# Patient Record
Sex: MALE | Race: WHITE | Employment: FULL TIME | URBAN - METROPOLITAN AREA
[De-identification: names, ages, dates, MRNs, and addresses within clinical notes are randomized per-mention and may not be internally consistent; named-entity substitution may affect disease eponyms.]

---

## 2021-09-29 ENCOUNTER — APPOINTMENT (OUTPATIENT)
Dept: CT IMAGING | Facility: CLINIC | Age: 46
DRG: 096 | End: 2021-09-29
Payer: COMMERCIAL

## 2021-09-29 ENCOUNTER — HOSPITAL ENCOUNTER (INPATIENT)
Age: 46
LOS: 14 days | Discharge: HOME | DRG: 096 | End: 2021-10-14
Attending: EMERGENCY MEDICINE | Admitting: FAMILY MEDICINE
Payer: COMMERCIAL

## 2021-09-29 DIAGNOSIS — G89.29 CHRONIC BILATERAL LOW BACK PAIN WITH BILATERAL SCIATICA: ICD-10-CM

## 2021-09-29 DIAGNOSIS — R20.2 PARESTHESIA OF BOTH HANDS: ICD-10-CM

## 2021-09-29 DIAGNOSIS — R20.2 PARESTHESIA OF BOTH LOWER EXTREMITIES: Primary | ICD-10-CM

## 2021-09-29 DIAGNOSIS — R53.1 GENERALIZED WEAKNESS: ICD-10-CM

## 2021-09-29 DIAGNOSIS — M54.42 CHRONIC BILATERAL LOW BACK PAIN WITH BILATERAL SCIATICA: ICD-10-CM

## 2021-09-29 DIAGNOSIS — M54.41 CHRONIC BILATERAL LOW BACK PAIN WITH BILATERAL SCIATICA: ICD-10-CM

## 2021-09-29 LAB
ABSOLUTE EOS #: 0.2 K/UL (ref 0–0.4)
ABSOLUTE IMMATURE GRANULOCYTE: NORMAL K/UL (ref 0–0.3)
ABSOLUTE LYMPH #: 1.8 K/UL (ref 1–4.8)
ABSOLUTE MONO #: 0.6 K/UL (ref 0.1–1.2)
ALBUMIN SERPL-MCNC: 4.5 G/DL (ref 3.5–5.2)
ALBUMIN/GLOBULIN RATIO: 1.8 (ref 1–2.5)
ALP BLD-CCNC: 72 U/L (ref 40–129)
ALT SERPL-CCNC: 29 U/L (ref 5–41)
ANION GAP SERPL CALCULATED.3IONS-SCNC: 10 MMOL/L (ref 9–17)
AST SERPL-CCNC: 18 U/L
BASOPHILS # BLD: 1 % (ref 0–2)
BASOPHILS ABSOLUTE: 0 K/UL (ref 0–0.2)
BILIRUB SERPL-MCNC: 0.4 MG/DL (ref 0.3–1.2)
BILIRUBIN URINE: NEGATIVE
BUN BLDV-MCNC: 17 MG/DL (ref 6–20)
BUN/CREAT BLD: ABNORMAL (ref 9–20)
C-REACTIVE PROTEIN: <3 MG/L (ref 0–5)
CALCIUM SERPL-MCNC: 9.6 MG/DL (ref 8.6–10.4)
CHLORIDE BLD-SCNC: 104 MMOL/L (ref 98–107)
CO2: 26 MMOL/L (ref 20–31)
COLOR: YELLOW
COMMENT UA: NORMAL
CREAT SERPL-MCNC: 1.3 MG/DL (ref 0.7–1.2)
DIFFERENTIAL TYPE: NORMAL
EOSINOPHILS RELATIVE PERCENT: 2 % (ref 1–4)
GFR AFRICAN AMERICAN: >60 ML/MIN
GFR NON-AFRICAN AMERICAN: 59 ML/MIN
GFR SERPL CREATININE-BSD FRML MDRD: ABNORMAL ML/MIN/{1.73_M2}
GFR SERPL CREATININE-BSD FRML MDRD: ABNORMAL ML/MIN/{1.73_M2}
GLUCOSE BLD-MCNC: 120 MG/DL (ref 70–99)
GLUCOSE URINE: NEGATIVE
HCT VFR BLD CALC: 41.2 % (ref 41–53)
HEMOGLOBIN: 13.9 G/DL (ref 13.5–17.5)
IMMATURE GRANULOCYTES: NORMAL %
INR BLD: 1.1
KETONES, URINE: NEGATIVE
LACTIC ACID, SEPSIS WHOLE BLOOD: NORMAL MMOL/L (ref 0.5–1.9)
LACTIC ACID, SEPSIS: 1 MMOL/L (ref 0.5–1.9)
LEUKOCYTE ESTERASE, URINE: NEGATIVE
LYMPHOCYTES # BLD: 26 % (ref 24–44)
MCH RBC QN AUTO: 28 PG (ref 26–34)
MCHC RBC AUTO-ENTMCNC: 33.7 G/DL (ref 31–37)
MCV RBC AUTO: 83.1 FL (ref 80–100)
MONOCYTES # BLD: 9 % (ref 2–11)
NITRITE, URINE: NEGATIVE
NRBC AUTOMATED: NORMAL PER 100 WBC
PARTIAL THROMBOPLASTIN TIME: 24.1 SEC (ref 21.3–31.3)
PDW BLD-RTO: 13.2 % (ref 12.5–15.4)
PH UA: 7 (ref 5–8)
PLATELET # BLD: 222 K/UL (ref 140–450)
PLATELET ESTIMATE: NORMAL
PMV BLD AUTO: 8.2 FL (ref 6–12)
POTASSIUM SERPL-SCNC: 4.1 MMOL/L (ref 3.7–5.3)
PROTEIN UA: NEGATIVE
PROTHROMBIN TIME: 11 SEC (ref 9.4–12.6)
RBC # BLD: 4.95 M/UL (ref 4.5–5.9)
RBC # BLD: NORMAL 10*6/UL
SARS-COV-2, RAPID: NOT DETECTED
SEDIMENTATION RATE, ERYTHROCYTE: <1 MM (ref 0–15)
SEG NEUTROPHILS: 62 % (ref 36–66)
SEGMENTED NEUTROPHILS ABSOLUTE COUNT: 4.2 K/UL (ref 1.8–7.7)
SODIUM BLD-SCNC: 140 MMOL/L (ref 135–144)
SPECIFIC GRAVITY UA: 1.01 (ref 1–1.03)
SPECIMEN DESCRIPTION: NORMAL
TOTAL PROTEIN: 7 G/DL (ref 6.4–8.3)
TROPONIN INTERP: NORMAL
TROPONIN T: NORMAL NG/ML
TROPONIN, HIGH SENSITIVITY: <6 NG/L (ref 0–22)
TURBIDITY: CLEAR
URINE HGB: NEGATIVE
UROBILINOGEN, URINE: NORMAL
WBC # BLD: 6.9 K/UL (ref 3.5–11)
WBC # BLD: NORMAL 10*3/UL

## 2021-09-29 PROCEDURE — 96375 TX/PRO/DX INJ NEW DRUG ADDON: CPT

## 2021-09-29 PROCEDURE — 99284 EMERGENCY DEPT VISIT MOD MDM: CPT

## 2021-09-29 PROCEDURE — 80053 COMPREHEN METABOLIC PANEL: CPT

## 2021-09-29 PROCEDURE — 84484 ASSAY OF TROPONIN QUANT: CPT

## 2021-09-29 PROCEDURE — 93005 ELECTROCARDIOGRAM TRACING: CPT | Performed by: EMERGENCY MEDICINE

## 2021-09-29 PROCEDURE — 72128 CT CHEST SPINE W/O DYE: CPT

## 2021-09-29 PROCEDURE — 96372 THER/PROPH/DIAG INJ SC/IM: CPT

## 2021-09-29 PROCEDURE — 36415 COLL VENOUS BLD VENIPUNCTURE: CPT

## 2021-09-29 PROCEDURE — 70450 CT HEAD/BRAIN W/O DYE: CPT

## 2021-09-29 PROCEDURE — 85025 COMPLETE CBC W/AUTO DIFF WBC: CPT

## 2021-09-29 PROCEDURE — 72125 CT NECK SPINE W/O DYE: CPT

## 2021-09-29 PROCEDURE — 87635 SARS-COV-2 COVID-19 AMP PRB: CPT

## 2021-09-29 PROCEDURE — 96374 THER/PROPH/DIAG INJ IV PUSH: CPT

## 2021-09-29 PROCEDURE — 72131 CT LUMBAR SPINE W/O DYE: CPT

## 2021-09-29 PROCEDURE — 85610 PROTHROMBIN TIME: CPT

## 2021-09-29 PROCEDURE — 83605 ASSAY OF LACTIC ACID: CPT

## 2021-09-29 PROCEDURE — 85652 RBC SED RATE AUTOMATED: CPT

## 2021-09-29 PROCEDURE — 81003 URINALYSIS AUTO W/O SCOPE: CPT

## 2021-09-29 PROCEDURE — 86140 C-REACTIVE PROTEIN: CPT

## 2021-09-29 PROCEDURE — 85730 THROMBOPLASTIN TIME PARTIAL: CPT

## 2021-09-29 PROCEDURE — 2580000003 HC RX 258: Performed by: EMERGENCY MEDICINE

## 2021-09-29 PROCEDURE — 6360000002 HC RX W HCPCS: Performed by: EMERGENCY MEDICINE

## 2021-09-29 RX ORDER — MORPHINE SULFATE 4 MG/ML
4 INJECTION, SOLUTION INTRAMUSCULAR; INTRAVENOUS ONCE
Status: COMPLETED | OUTPATIENT
Start: 2021-09-29 | End: 2021-09-29

## 2021-09-29 RX ORDER — 0.9 % SODIUM CHLORIDE 0.9 %
1000 INTRAVENOUS SOLUTION INTRAVENOUS ONCE
Status: COMPLETED | OUTPATIENT
Start: 2021-09-29 | End: 2021-09-29

## 2021-09-29 RX ORDER — ORPHENADRINE CITRATE 30 MG/ML
60 INJECTION INTRAMUSCULAR; INTRAVENOUS ONCE
Status: COMPLETED | OUTPATIENT
Start: 2021-09-29 | End: 2021-09-29

## 2021-09-29 RX ORDER — SODIUM CHLORIDE 9 MG/ML
1000 INJECTION, SOLUTION INTRAVENOUS CONTINUOUS
Status: DISCONTINUED | OUTPATIENT
Start: 2021-09-29 | End: 2021-10-04

## 2021-09-29 RX ADMIN — Medication 4 MG: at 23:02

## 2021-09-29 RX ADMIN — ORPHENADRINE CITRATE 60 MG: 60 INJECTION INTRAMUSCULAR; INTRAVENOUS at 23:01

## 2021-09-29 RX ADMIN — SODIUM CHLORIDE 1000 ML: 9 INJECTION, SOLUTION INTRAVENOUS at 23:00

## 2021-09-29 RX ADMIN — SODIUM CHLORIDE 1000 ML: 9 INJECTION, SOLUTION INTRAVENOUS at 21:11

## 2021-09-29 ASSESSMENT — PAIN SCALES - GENERAL
PAINLEVEL_OUTOF10: 9
PAINLEVEL_OUTOF10: 4

## 2021-09-30 ENCOUNTER — APPOINTMENT (OUTPATIENT)
Dept: MRI IMAGING | Age: 46
DRG: 096 | End: 2021-09-30
Payer: COMMERCIAL

## 2021-09-30 PROBLEM — R20.2 LEG PARESTHESIA: Status: ACTIVE | Noted: 2021-09-30

## 2021-09-30 LAB
EKG ATRIAL RATE: 68 BPM
EKG P AXIS: 34 DEGREES
EKG P-R INTERVAL: 170 MS
EKG Q-T INTERVAL: 388 MS
EKG QRS DURATION: 94 MS
EKG QTC CALCULATION (BAZETT): 412 MS
EKG R AXIS: -5 DEGREES
EKG T AXIS: 12 DEGREES
EKG VENTRICULAR RATE: 68 BPM
GLUCOSE BLD-MCNC: 97 MG/DL (ref 75–110)
MYOGLOBIN: 66 NG/ML (ref 28–72)
TOTAL CK: 158 U/L (ref 39–308)

## 2021-09-30 PROCEDURE — 94761 N-INVAS EAR/PLS OXIMETRY MLT: CPT

## 2021-09-30 PROCEDURE — 93880 EXTRACRANIAL BILAT STUDY: CPT

## 2021-09-30 PROCEDURE — 6370000000 HC RX 637 (ALT 250 FOR IP): Performed by: PSYCHIATRY & NEUROLOGY

## 2021-09-30 PROCEDURE — 6370000000 HC RX 637 (ALT 250 FOR IP): Performed by: FAMILY MEDICINE

## 2021-09-30 PROCEDURE — 2060000000 HC ICU INTERMEDIATE R&B

## 2021-09-30 PROCEDURE — 36415 COLL VENOUS BLD VENIPUNCTURE: CPT

## 2021-09-30 PROCEDURE — 99253 IP/OBS CNSLTJ NEW/EST LOW 45: CPT | Performed by: PSYCHIATRY & NEUROLOGY

## 2021-09-30 PROCEDURE — 92523 SPEECH SOUND LANG COMPREHEN: CPT

## 2021-09-30 PROCEDURE — 2580000003 HC RX 258: Performed by: EMERGENCY MEDICINE

## 2021-09-30 PROCEDURE — 82550 ASSAY OF CK (CPK): CPT

## 2021-09-30 PROCEDURE — 94799 UNLISTED PULMONARY SVC/PX: CPT

## 2021-09-30 PROCEDURE — 2000000000 HC ICU R&B

## 2021-09-30 PROCEDURE — 6360000002 HC RX W HCPCS: Performed by: PSYCHIATRY & NEUROLOGY

## 2021-09-30 PROCEDURE — 6360000002 HC RX W HCPCS: Performed by: EMERGENCY MEDICINE

## 2021-09-30 PROCEDURE — 82947 ASSAY GLUCOSE BLOOD QUANT: CPT

## 2021-09-30 PROCEDURE — 81001 URINALYSIS AUTO W/SCOPE: CPT

## 2021-09-30 PROCEDURE — 83874 ASSAY OF MYOGLOBIN: CPT

## 2021-09-30 PROCEDURE — 82085 ASSAY OF ALDOLASE: CPT

## 2021-09-30 RX ORDER — ASPIRIN 300 MG/1
300 SUPPOSITORY RECTAL DAILY
Status: DISCONTINUED | OUTPATIENT
Start: 2021-09-30 | End: 2021-10-02

## 2021-09-30 RX ORDER — ATORVASTATIN CALCIUM 40 MG/1
40 TABLET, FILM COATED ORAL DAILY
COMMUNITY

## 2021-09-30 RX ORDER — ZOLPIDEM TARTRATE 5 MG/1
5 TABLET ORAL NIGHTLY PRN
Status: DISCONTINUED | OUTPATIENT
Start: 2021-09-30 | End: 2021-10-12

## 2021-09-30 RX ORDER — DIPHENHYDRAMINE HCL 25 MG
25 TABLET ORAL EVERY 6 HOURS PRN
Status: DISCONTINUED | OUTPATIENT
Start: 2021-09-30 | End: 2021-10-14 | Stop reason: HOSPADM

## 2021-09-30 RX ORDER — ONDANSETRON 2 MG/ML
4 INJECTION INTRAMUSCULAR; INTRAVENOUS EVERY 6 HOURS PRN
Status: DISCONTINUED | OUTPATIENT
Start: 2021-09-30 | End: 2021-10-14 | Stop reason: HOSPADM

## 2021-09-30 RX ORDER — ACETAMINOPHEN 325 MG/1
650 TABLET ORAL ONCE
Status: COMPLETED | OUTPATIENT
Start: 2021-09-30 | End: 2021-09-30

## 2021-09-30 RX ORDER — SODIUM CHLORIDE 0.9 % (FLUSH) 0.9 %
10 SYRINGE (ML) INJECTION EVERY 12 HOURS SCHEDULED
Status: DISCONTINUED | OUTPATIENT
Start: 2021-09-30 | End: 2021-10-14 | Stop reason: HOSPADM

## 2021-09-30 RX ORDER — DIPHENHYDRAMINE HCL 25 MG
25 TABLET ORAL ONCE
Status: COMPLETED | OUTPATIENT
Start: 2021-09-30 | End: 2021-09-30

## 2021-09-30 RX ORDER — ACETAMINOPHEN 325 MG/1
650 TABLET ORAL EVERY 6 HOURS PRN
Status: DISCONTINUED | OUTPATIENT
Start: 2021-09-30 | End: 2021-10-05

## 2021-09-30 RX ORDER — POLYETHYLENE GLYCOL 3350 17 G/17G
17 POWDER, FOR SOLUTION ORAL DAILY
COMMUNITY

## 2021-09-30 RX ORDER — SODIUM CHLORIDE 9 MG/ML
25 INJECTION, SOLUTION INTRAVENOUS PRN
Status: DISCONTINUED | OUTPATIENT
Start: 2021-09-30 | End: 2021-10-14 | Stop reason: HOSPADM

## 2021-09-30 RX ORDER — KETOROLAC TROMETHAMINE 30 MG/ML
30 INJECTION, SOLUTION INTRAMUSCULAR; INTRAVENOUS ONCE
Status: COMPLETED | OUTPATIENT
Start: 2021-09-30 | End: 2021-09-30

## 2021-09-30 RX ORDER — AMLODIPINE BESYLATE 2.5 MG/1
2.5 TABLET ORAL DAILY
COMMUNITY

## 2021-09-30 RX ORDER — METOCLOPRAMIDE HYDROCHLORIDE 5 MG/ML
10 INJECTION INTRAMUSCULAR; INTRAVENOUS ONCE
Status: COMPLETED | OUTPATIENT
Start: 2021-09-30 | End: 2021-09-30

## 2021-09-30 RX ORDER — LISINOPRIL 40 MG/1
40 TABLET ORAL DAILY
Status: ON HOLD | COMMUNITY
End: 2021-10-14 | Stop reason: HOSPADM

## 2021-09-30 RX ORDER — DIPHENHYDRAMINE HYDROCHLORIDE 50 MG/ML
25 INJECTION INTRAMUSCULAR; INTRAVENOUS ONCE
Status: COMPLETED | OUTPATIENT
Start: 2021-09-30 | End: 2021-09-30

## 2021-09-30 RX ORDER — ZINC 25 MG
1 TABLET ORAL DAILY
COMMUNITY

## 2021-09-30 RX ORDER — ONDANSETRON 4 MG/1
4 TABLET, ORALLY DISINTEGRATING ORAL EVERY 8 HOURS PRN
Status: DISCONTINUED | OUTPATIENT
Start: 2021-09-30 | End: 2021-10-14 | Stop reason: HOSPADM

## 2021-09-30 RX ORDER — SODIUM CHLORIDE 0.9 % (FLUSH) 0.9 %
10 SYRINGE (ML) INJECTION PRN
Status: DISCONTINUED | OUTPATIENT
Start: 2021-09-30 | End: 2021-10-14 | Stop reason: HOSPADM

## 2021-09-30 RX ORDER — HYDROCODONE BITARTRATE AND ACETAMINOPHEN 5; 325 MG/1; MG/1
1 TABLET ORAL EVERY 4 HOURS PRN
Status: DISCONTINUED | OUTPATIENT
Start: 2021-09-30 | End: 2021-10-14 | Stop reason: HOSPADM

## 2021-09-30 RX ORDER — ASPIRIN 81 MG/1
81 TABLET ORAL DAILY
Status: DISCONTINUED | OUTPATIENT
Start: 2021-09-30 | End: 2021-10-03

## 2021-09-30 RX ORDER — ATORVASTATIN CALCIUM 40 MG/1
40 TABLET, FILM COATED ORAL NIGHTLY
Status: DISCONTINUED | OUTPATIENT
Start: 2021-09-30 | End: 2021-10-02

## 2021-09-30 RX ADMIN — METOCLOPRAMIDE HYDROCHLORIDE 10 MG: 5 INJECTION INTRAMUSCULAR; INTRAVENOUS at 01:28

## 2021-09-30 RX ADMIN — KETOROLAC TROMETHAMINE 30 MG: 30 INJECTION, SOLUTION INTRAMUSCULAR at 01:28

## 2021-09-30 RX ADMIN — IMMUNE GLOBULIN (HUMAN) 35 G: 10 INJECTION INTRAVENOUS; SUBCUTANEOUS at 15:04

## 2021-09-30 RX ADMIN — ATORVASTATIN CALCIUM 40 MG: 40 TABLET, FILM COATED ORAL at 22:53

## 2021-09-30 RX ADMIN — ZOLPIDEM TARTRATE 5 MG: 5 TABLET ORAL at 22:53

## 2021-09-30 RX ADMIN — HYDROCODONE BITARTRATE AND ACETAMINOPHEN 1 TABLET: 5; 325 TABLET ORAL at 22:53

## 2021-09-30 RX ADMIN — SODIUM CHLORIDE 1000 ML: 9 INJECTION, SOLUTION INTRAVENOUS at 07:29

## 2021-09-30 RX ADMIN — DIPHENHYDRAMINE HYDROCHLORIDE 25 MG: 50 INJECTION, SOLUTION INTRAMUSCULAR; INTRAVENOUS at 01:27

## 2021-09-30 RX ADMIN — DIPHENHYDRAMINE HCL 25 MG: 25 TABLET ORAL at 14:37

## 2021-09-30 RX ADMIN — ONDANSETRON 4 MG: 4 TABLET, ORALLY DISINTEGRATING ORAL at 15:14

## 2021-09-30 RX ADMIN — ACETAMINOPHEN 650 MG: 325 TABLET ORAL at 14:38

## 2021-09-30 ASSESSMENT — PAIN DESCRIPTION - LOCATION
LOCATION: BACK
LOCATION: HEAD

## 2021-09-30 ASSESSMENT — PAIN SCALES - GENERAL
PAINLEVEL_OUTOF10: 6
PAINLEVEL_OUTOF10: 7
PAINLEVEL_OUTOF10: 7

## 2021-09-30 ASSESSMENT — PAIN DESCRIPTION - DESCRIPTORS: DESCRIPTORS: ACHING;DISCOMFORT;SHOOTING

## 2021-09-30 ASSESSMENT — PAIN DESCRIPTION - PAIN TYPE
TYPE: ACUTE PAIN
TYPE: ACUTE PAIN

## 2021-09-30 ASSESSMENT — PAIN DESCRIPTION - ONSET: ONSET: ON-GOING

## 2021-09-30 ASSESSMENT — PAIN DESCRIPTION - ORIENTATION: ORIENTATION: RIGHT;MID

## 2021-09-30 ASSESSMENT — PAIN DESCRIPTION - FREQUENCY: FREQUENCY: CONTINUOUS

## 2021-09-30 NOTE — ED TRIAGE NOTES
Pt to ED with c/o of worsening weakness and numbness. Pt reports that starting Monday morning he has bilat numbness in legs, and numbness in right hand. Pt reports that he has hx of chronic back pain with associated leg numbness but it has been getting worse. Pt is alert and oriented, NAD, RR even and unlabored. Pt placed on cardiac monitor, continuous pulse ox, and BP cuff.  Pt LKW was Sunday night

## 2021-09-30 NOTE — CONSULTS
Neurology Consult Note        Reason for Consult:  subcute progressive weakness and numbness  Requesting Physician:  Dr Ronald Nails:  Numbness and weakness    History Obtained From:  patient       HISTORY OF PRESENT ILLNESS:    This is a generally healthy and active 52 y/o WM who on Monday began to notice numbness in his feet. He originally attributed this to some musculoskeletal issues but then began to notice fatigue and weakness in his legs and increased lower back and upper neck pain. Yesterday he noted numbness in his finger tips and also progressive weakness in his arms. Of note he had a fairly severe viral illness about 2 weeks ago which consisted of severe malaise, fevers and arthralgias but this resolved after about 2 days. He presently reports he is getting worse day to day and is now very unsteady on his feet.                    Past Medical History:        Diagnosis Date    Herniation of intervertebral disc between L5 and S1     Low back pain     Sciatica      Past Surgical History:        Procedure Laterality Date    HERNIA REPAIR Right     inguinal     Current Medications:   Current Facility-Administered Medications: sodium chloride flush 0.9 % injection 10 mL, 10 mL, IntraVENous, 2 times per day  sodium chloride flush 0.9 % injection 10 mL, 10 mL, IntraVENous, PRN  0.9 % sodium chloride infusion, 25 mL, IntraVENous, PRN  ondansetron (ZOFRAN-ODT) disintegrating tablet 4 mg, 4 mg, Oral, Q8H PRN **OR** ondansetron (ZOFRAN) injection 4 mg, 4 mg, IntraVENous, Q6H PRN  magnesium hydroxide (MILK OF MAGNESIA) 400 MG/5ML suspension 30 mL, 30 mL, Oral, Daily PRN  enoxaparin (LOVENOX) injection 40 mg, 40 mg, SubCUTAneous, Daily  aspirin EC tablet 81 mg, 81 mg, Oral, Daily **OR** aspirin suppository 300 mg, 300 mg, Rectal, Daily  atorvastatin (LIPITOR) tablet 40 mg, 40 mg, Oral, Nightly  0.9 % sodium chloride infusion, 1,000 mL, IntraVENous, Continuous  Allergies:  Patient has no known allergies. Social History:  TOBACCO:   reports that he quit smoking about 7 years ago. He has never used smokeless tobacco.  ETOH:   reports previous alcohol use. DRUGS:   reports previous drug use. Family History:   History reviewed. No pertinent family history. REVIEW OF SYSTEMS:  As noted in HPI    PHYSICAL EXAM:    Vitals:  BP (!) 143/79   Pulse 62   Temp 98.2 °F (36.8 °C) (Oral)   Resp 16   Ht 6' (1.829 m)   Wt 200 lb (90.7 kg)   SpO2 99%   BMI 27.12 kg/m²      General Appearance: In some discomfort with moving around    Mental Status Exam:             Level of Alertness:   awake            Orientation:   person, place, time            Memory:   normal            Fund of Knowledge:  normal            Attention/Concentration:  normal            Language:  normal      Cranial Nerves    PERRL, EOMI/F, no ptosis, face symmetric to movement and sensation, no dysarthria or dysphonia    Motor:    Deltoids: 4/5  Triceps/biceps: 4/5  Ext rotators: 4-/5  HF: 4/5  KE: 4/5  KF: 4/5  DF: 4/5      Sensory:  Vibratory sense appears quite intact distally in upper and lower extremities  Temperature sense is reduced distally in upper and lower extremities    Reflexes:  0 throughout    Romberg: positive              IMPRESSION/RECOMMENDATIONS:   This is a previously healthy 52 y/o with subacute progressive weakness and numbness in all extremities beginning 4 days ago and following about 2-3 weeks after a viral infection. He also has significant backpain and some neck pain. His exam reveals areflexia and both weakness and mild sensory loss. 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       Recommendations:  Begin IVIG ASAP 2g/kg over 5 days  NIFs q6  No need for additional diagnostic imaging  Can get an LP at some point for confirmation but this really isn't a priority and will not change immediate management            Jarrod Morgan MD  9/30/2021

## 2021-09-30 NOTE — H&P
History & Physical  [facility].,    Adult Hospitalist      Name: Thom Lyon  MRN: [id number]     Acct: [de-identified]  Room: 2012/2012-02    Admit Date: 9/29/2021  8:10 PM  PCP: Anisa Mata MD    Primary Problem  Active Problems:    Leg paresthesia  Resolved Problems:    * No resolved hospital problems. 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 Chief Complaint:     Chief Complaint   Patient presents with    Extremity Weakness    Hand Numbness         History of Present Illness:      Thom Lyon is a 51 y.o.  male who presents with Extremity Weakness and Hand Numbness    Patient admitted through the [facility] ED where he presented with complaints of paresthesias that started off over his back radiating to the back of his thighs. Patient says he later noticed paresthesias which were patchy over the lower extremities but most predominant over his distal foot and toes. This started 3 days ago and he was woken up with the numbness on his back and bilateral lower extremities. Patient says he took some muscle relaxant and his symptoms improved. Patient says since yesterday his symptoms reappeared and have been more progressive. Patient says he has had similar feelings in his hands. In addition he feels his lower and upper extremities have been weaker. Patient says it has been more difficult to go up and down stairs.   He has not been able to elevate his arms above his head and has not been able to comb his hair. Patient complains of weakness in his thighs associated with a dull ache. Patient also complains of a dull mild to moderate and progressive nonradiating occipital headache. Patient says he had a URI almost 2 weeks ago. He was tested for COVID-19 which was negative. Complains of intermittent nausea. Denies abdominal pain or vomiting. Denies fever, chills, joint swelling or rash. Denies chest pain, dyspnea or orthopnea. Denies saddle anesthesia, urinary or bowel dysfunction. Denies any fall or trauma to the back or head. I have personally reviewed the past medical history, past surgical history, medications, social history, and family history, and summarized in the note. Review of Systems:     All 10 point system is reviewed and negative otherwise mentioned in HPI. Past Medical History:     Past Medical History:   Diagnosis Date    Herniation of intervertebral disc between L5 and S1     Low back pain     Sciatica         Past Surgical History:     Past Surgical History:   Procedure Laterality Date    HERNIA REPAIR Right     inguinal        Medications Prior to Admission:       Prior to Admission medications    Not on File        Allergies:       Patient has no known allergies. Social History:     Tobacco:    has no history on file for tobacco use. Alcohol:      has no history on file for alcohol use. Drug Use:  has no history on file for drug use. Family History:     History reviewed. No pertinent family history.       Physical Exam:     Vitals:  BP (!) 147/103   Pulse 57   Temp 97.7 °F (36.5 °C) (Oral)   Resp 18   Ht 6' (1.829 m)   Wt 200 lb (90.7 kg)   SpO2 100%   BMI 27.12 kg/m²   Temp (24hrs), Av.2 °F (36.8 °C), Min:97.7 °F (36.5 °C), Max:98.6 °F (37 °C)          General appearance - alert, well appearing, and in no acute distress  Mental status - oriented to person, place, and time with normal affect  Head - normocephalic and atraumatic  Eyes - pupils equal and reactive, extraocular eye movements intact, conjunctiva clear  Ears - hearing appears to be intact  Nose - no drainage noted  Mouth - mucous membranes moist  Neck - supple, no carotid bruits, thyroid not palpable  Chest - clear to auscultation, normal effort  Heart - normal rate, regular rhythm, no murmur  Abdomen - soft, nontender, nondistended, bowel sounds present all four quadrants, no masses, hepatomegaly or splenomegaly  Neurological - normal speech, significant weakness of flexor and extensor muscle groups over bilateral upper and bilateral lower extremities. cranial nerves II through XII grossly intact. Reduced sensations bilateral distal feet and all fingers  Extremities - peripheral pulses palpable, no pedal edema or calf pain with palpation  Skin - no gross lesions, rashes, or induration noted        Data:     Labs:    Hematology:  Recent Labs     09/29/21 2128   WBC 6.9   RBC 4.95   HGB 13.9   HCT 41.2   MCV 83.1   MCH 28.0   MCHC 33.7   RDW 13.2      MPV 8.2   SEDRATE <1   CRP <3.0   INR 1.1     Chemistry:  Recent Labs     09/29/21 2128      K 4.1      CO2 26   GLUCOSE 120*   BUN 17   CREATININE 1.30*   ANIONGAP 10   LABGLOM 59*   GFRAA >60   CALCIUM 9.6   TROPHS <6     Recent Labs     09/29/21 2128   PROT 7.0   LABALBU 4.5   AST 18   ALT 29   ALKPHOS 72   BILITOT 0.40       Lab Results   Component Value Date    INR 1.1 09/29/2021    PROTIME 11.0 09/29/2021       No results found for: SPECIAL  No results found for: CULTURE    No results found for: POCPH, PHART, PH, POCPCO2, RWR0BXE, PCO2, POCPO2, PO2ART, PO2, POCHCO3, ANF2CVZ, HCO3, NBEA, PBEA, BEART, BE, THGBART, THB, WBF4FAB, YWJU4YWH, W1BWTKSO, O2SAT, FIO2    Radiology:    CT Head WO Contrast    Result Date: 9/29/2021  No acute intracranial abnormality. CT CERVICAL SPINE WO CONTRAST    Result Date: 9/29/2021  No acute abnormality of the cervical spine. CT THORACIC SPINE WO CONTRAST    Result Date: 9/29/2021  1. No large disc herniation or protrusion by CT. 2. Other findings as described. CT Lumbar Spine WO Contrast    Result Date: 9/29/2021  1. No acute fracture. No listhesis. 2. Other findings as described. All radiological studies reviewed                Code Status:  No Order    Electronically signed by Anahy Givens MD on 9/30/2021 at 8:19 AM     Copy sent to Dr. Araceli Rajan MD    This note was created with the assistance of a speech-recognition program.  Although the intention is to generate a document that actually reflects the content of the visit, no guarantees can be provided that every mistake has been identified and corrected by editing. Note was updated later by me after  physical examination and  completion of the assessment.

## 2021-09-30 NOTE — PLAN OF CARE
Problem: Falls - Risk of:  Goal: Will remain free from falls  Description: Will remain free from falls  Outcome: Ongoing  Note: Ongoing     Problem: Infection:  Goal: Will remain free from infection  Description: Will remain free from infection  Outcome: Ongoing  Note: Ongoing     Problem: Safety:  Goal: Free from accidental physical injury  Description: Free from accidental physical injury  Outcome: Ongoing  Note: Ongoing     Problem: Pain:  Goal: Patient's pain/discomfort is manageable  Description: Patient's pain/discomfort is manageable  Outcome: Ongoing  Note: Ongoing

## 2021-09-30 NOTE — PROGRESS NOTES
IVIG initiated at 0.6ml/kg/hour at a rate of 54.4 ml/hour. Will increase rate in 30 minutes. Will continue to monitor patient for any adverse reactions.

## 2021-09-30 NOTE — ED NOTES
[facility] access called spoke with Sánchez Celestin RN to consult hospitalist for admission. [facility] neurology was paged for the second time Dr. Munoz notified.      Jdui Cotton 139, RN  09/30/21 0029

## 2021-09-30 NOTE — ED NOTES
Consulted [facility] for consult to Neurology.       Judi Cotton 139, RN  09/29/21 [id number]

## 2021-09-30 NOTE — CARE COORDINATION
Case Management Initial Discharge Plan  Celestino Jeans,             Met with:patient or spouse/SO to discuss discharge plans. Information verified: address, contacts, phone number, , insurance Yes  Insurance Provider: Zehra Marion    Emergency Contact/Next of Kin name & number: Lexie/spouse     Who are involved in patient's support system? spouse    PCP: Jatinder Hernandez MD  Date of last visit:       Discharge Planning    Living Arrangements:  Spouse/Significant Other, Children     Home has 2 stories  2 stairs to climb to get into front door, 1 flight stairs to climb to reach second floor  Location of bedroom/bathroom in home 2nd floor    Patient able to perform ADL's:Independent    Current Services (outpatient & in home) none  DME equipment: 0  DME provider: 0    Is patient receiving oral anticoagulation therapy? No    If indicated:   Physician managing anticoagulation treatment:   Where does patient obtain lab work for ATC treatment? Potential Assistance Needed:  N/A    Patient agreeable to home care: No  [place] of choice provided:  n/a    Prior SNF/Rehab Placement and Facility: n/a  Agreeable to SNF/Rehab: No  [place] of choice provided: n/a     Evaluation: no    Expected Discharge date:  10/04/21    Patient expects to be discharged to: If home: is the family and/or caregiver wiling & able to provide support at home? yes  Who will be providing this support? Self and spouse    Follow Up Appointment: Best Day/ Time:      Transportation provider: spouse  Transportation arrangements needed for discharge: No    Readmission Risk              Risk of Unplanned Readmission:  8             Does patient have a readmission risk score greater than 14?: No  If yes, follow-up appointment must be made within 7 days of discharge.      Goals of Care:       Educated patient and spouse on transitional options, provided freedom of choice and are agreeable with plan      Discharge Plan: Generalized weakness  Patient lives with spouse in a 2 story home with 2 steps to enter. Declines any skilled needs at this time. [facility]  PT/OT to al  Neurology consulted. LP was done but unsuccessful. Attempting to R/O Elizabeth Conrado. Waiting on treatment plan. Continue to follow.               Electronically signed by Teena Leon RN on 9/30/21 at 11:43 AM EDT

## 2021-09-30 NOTE — ED PROVIDER NOTES
[place] [facility] ED  [facility]  Phone: [phone]      Pt Name: Madonna Crespo  [facility]:[id number]  [place] 1975  Date of evaluation: 9/29/2021      CHIEF COMPLAINT       Chief Complaint   Patient presents with    Extremity Weakness    Hand Numbness       HISTORY OF PRESENT ILLNESS   Madonna Crespo is a 51 y.o. male who presents for evaluation of extremity weakness and paresthesias. The patient reports that he has history of chronic back pain and a MRI of his lumbar spine several years ago showed a L5/S1 herniated disc. The patient reports that he will intermittently have right-sided sciatica but this is normally well managed with NSAIDs, muscle relaxers and by seeing his chiropractor. The patient also has history of chronic pain to his right lower quadrant abdomen since he had a right inguinal hernia repair but he recently had a CT scan of his abdomen pelvis that was negative for any acute process. The patient reports that on 9/15/2021 he developed flulike symptoms with fever, chills and myalgias. His symptoms lasted for approximately 2 days and he was tested on 9/18/2021 for COVID-19 and was negative. The patient reports that starting on 9/27/2021 he woke up with numbness to the back of his bilateral legs. He states that he had taken a muscle relaxer the night before for his chronic low back pain but has never had numbness or tingling after taking that medicine. The patient reports that over the past few days he has developed gradual onset, constant, progressive, numbness to his legs with associated bilateral leg pain. He states that he has had difficulty sleeping for the past 2 nights secondary to his symptoms. Starting this morning the patient woke up with numbness to his bilateral hands. He has also noticed generalized weakness to his arms and legs.   He states that it is difficult to even lift his arms above his head and when he was walking down the stairs he noticed that his legs seemed slightly uncoordinated. The patient reports that starting this evening he developed a gradual onset, constant, progressive, dull, achy, nonradiating, occipital headache with associated posterior cervical lymphadenopathy. The patient he does not list any provoking or palliating factors. His symptoms are constant and progressively getting worse. He denies any previous history of his current symptoms and denies any recent trauma or falls. The patient has had some associated nausea but denies any vomiting. He was Covid vaccinated in May 2021. The patient denies any history of back surgery, IV drug use, urinary/bowel incontinence or retention, saddle anesthesia. He denies any sick contacts. The patient denies recent fever, chills, vomiting, neck pain, chest pain, shortness of breath, abdominal pain, urinary/bowel symptoms, recent injury or falls. REVIEW OF SYSTEMS     Ten point review of systems was reviewed and is negative unless otherwise noted in the HPI    [address]    has a past medical history of Herniation of intervertebral disc between L5 and S1, Low back pain, and Sciatica. SURGICAL HISTORY      has a past surgical history that includes hernia repair (Right). CURRENT MEDICATIONS       Previous Medications    No medications on file       ALLERGIES     has No Known Allergies. FAMILY HISTORY     has no family status information on file. family history is not on file. SOCIAL HISTORY          PHYSICAL EXAM     INITIAL VITALS:  height is 6' (1.829 m) and weight is 90.7 kg (200 lb). His oral temperature is 98.6 °F (37 °C). His blood pressure is 165/107 (abnormal) and his pulse is 75. His respiration is 18 and oxygen saturation is 99%.      CONSTITUTIONAL: no apparent distress, well appearing  SKIN: warm, dry, no jaundice, hives or petechiae  EYES: clear conjunctiva, non-icteric sclera, pupils 3 mm, equal, round and reactive to light  HENT: normocephalic, atraumatic, moist mucus membranes  NECK: Nontender and supple with no nuchal rigidity, full range of motion  PULMONARY: clear to auscultation without wheezes, rhonchi, or rales, normal excursion, no accessory muscle use and no stridor  CARDIOVASCULAR: regular rate, rhythm. Strong radial pulses with intact distal perfusion. Capillary refill <2 seconds. GASTROINTESTINAL: soft, non-tender, non-distended, no palpable masses, no rebound or guarding   GENITOURINARY: No costovertebral angle tenderness to palpation  MUSCULOSKELETAL: Radial, ulnar and median nerves intact to the bilateral upper extremities. Able to perform intrinsic movements of the hand without difficulty. Normal  strength bilaterally. No snuff box tenderness. Radial pulses 2+/4. Capillary refill less than 2 seconds. Negative elevated arm test.  Negative drop arm test.  Negative straight leg test.  No midline spinal tenderness, step off or deformity. Extremities are otherwise nontender to palpation and nonerythematous. Compartments soft. No peripheral edema. NEUROLOGIC: alert and oriented x 3, GCS 15, normal mentation and speech. Moves all extremities x 4 without motor or sensory deficit on testing but has subjective paresthesias to his bilateral legs and hands, gait is stable without ataxia, normal perirectal tone and sensation. Downgoing Babinski's. Normal proprioception. DP/PT pulses 2+/4 and equal bilaterally. Normal DTRs. Normal Achilles tendon reflex.   PSYCHIATRIC: normal mood and affect, thought process is clear and linear    DIAGNOSTIC RESULTS     EKG:  EKG 2104 sinus rhythm, rate 60 bpm, normal axis, normal intervals, no ST elevation or depression, T wave inversion in 3, T wave flattening in aVF, poor R wave progression, Q wave in 1 and aVL, no previous EKG for comparison    RADIOLOGY:   CT Head WO Contrast    Result Date: 9/29/2021  EXAMINATION: CT OF THE HEAD WITHOUT CONTRAST  9/29/2021 6:39 pm TECHNIQUE: CT of the head was performed without the administration of intravenous contrast. Dose modulation, iterative reconstruction, and/or weight based adjustment of the mA/kV was utilized to reduce the radiation dose to as low as reasonably achievable. COMPARISON: None. HISTORY: ORDERING SYSTEM PROVIDED HISTORY: generalized weakness TECHNOLOGIST PROVIDED HISTORY: generalized weakness Decision Support Exception - unselect if not a suspected or confirmed emergency medical condition->Emergency Medical Condition (MA) Reason for Exam: Extremity weakness, hand and leg numbness. No injury Acuity: Acute Type of Exam: Initial FINDINGS: BRAIN/VENTRICLES: There is no acute intracranial hemorrhage, mass effect or midline shift. No abnormal extra-axial fluid collection. The gray-white differentiation is maintained without evidence of an acute infarct. There is no evidence of hydrocephalus. ORBITS: The visualized portion of the orbits demonstrate no acute abnormality. SINUSES: The visualized paranasal sinuses and mastoid air cells demonstrate no acute abnormality. SOFT TISSUES/SKULL:  No acute abnormality of the visualized skull or soft tissues. No acute intracranial abnormality. CT CERVICAL SPINE WO CONTRAST    Result Date: 9/29/2021  EXAMINATION: CT OF THE CERVICAL SPINE WITHOUT CONTRAST 9/29/2021 9:39 pm TECHNIQUE: CT of the cervical spine was performed without the administration of intravenous contrast. Multiplanar reformatted images are provided for review. Dose modulation, iterative reconstruction, and/or weight based adjustment of the mA/kV was utilized to reduce the radiation dose to as low as reasonably achievable. COMPARISON: None. HISTORY: ORDERING SYSTEM PROVIDED HISTORY: arm weakness TECHNOLOGIST PROVIDED HISTORY: arm weakness Decision Support Exception - unselect if not a suspected or confirmed emergency medical condition->Emergency Medical Condition (MA) Reason for Exam: Extremity weakness, hand and leg numbness.  No injury Acuity: Acute Type of Exam: Initial FINDINGS: BONES/ALIGNMENT: There is no acute fracture or traumatic malalignment. DEGENERATIVE CHANGES: No significant degenerative changes. SOFT TISSUES: There is no prevertebral soft tissue swelling. No acute abnormality of the cervical spine. CT THORACIC SPINE WO CONTRAST    Result Date: 9/29/2021  EXAMINATION: CT OF THE THORACIC SPINE WITHOUT CONTRAST  9/29/2021 9:39 pm: TECHNIQUE: CT of the thoracic spine was performed without the administration of intravenous contrast. Multiplanar reformatted images are provided for review. Dose modulation, iterative reconstruction, and/or weight based adjustment of the mA/kV was utilized to reduce the radiation dose to as low as reasonably achievable. COMPARISON: None. HISTORY: ORDERING SYSTEM PROVIDED HISTORY: low back pain and leg weakness TECHNOLOGIST PROVIDED HISTORY: low back pain and leg weakness Reason for Exam: Extremity weakness, hand and leg numbness. No injury Acuity: Acute Type of Exam: Initial FINDINGS: BONES/ALIGNMENT: Alignment is within normal limits. Vertebral body heights appear maintained. Mild loss of disc height. Posterior elements appear intact. DEGENERATIVE CHANGES: Scattered degenerative changes noted in the visualized spine without spondylolisthesis. No large disc herniation or protrusion by CT. Few scattered mild foraminal stenoses. No canal stenosis by CT. SOFT TISSUES: Visualized paraspinal soft tissues appear unremarkable. 1. No large disc herniation or protrusion by CT. 2. Other findings as described. CT Lumbar Spine WO Contrast    Result Date: 9/29/2021  EXAMINATION: CT OF THE LUMBAR SPINE WITHOUT CONTRAST  9/29/2021 TECHNIQUE: CT of the lumbar spine was performed without the administration of intravenous contrast. Multiplanar reformatted images are provided for review. Adjustment of mA and/or kV according to patient size was utilized.   Dose modulation, iterative reconstruction, and/or weight based adjustment of the mA/kV was utilized to reduce the radiation dose to as low as reasonably achievable. COMPARISON: None HISTORY: ORDERING SYSTEM PROVIDED HISTORY: BACK PAIN TECHNOLOGIST PROVIDED HISTORY: low back pain and leg weakness Decision Support Exception - unselect if not a suspected or confirmed emergency medical condition->Emergency Medical Condition (MA) Reason for Exam: Extremity weakness, hand and leg numbness. No injury Acuity: Acute Type of Exam: Initial FINDINGS: BONES/ALIGNMENT: The inferior-most complete disc space represents L5-S1. Grade 1 retrolisthesis of L2 on L3. Vertebral body heights appear maintained. Severe loss of height L5-S1. Otherwise, mild loss of disc height. Posterior elements appear intact. DEGENERATIVE CHANGES: Degenerative changes at L5-S1. Partially calcified left paracentral disc protrusion at L5-S1 with mild canal stenosis. No large disc herniation or protrusion by CT. Spinal canal and neural foramina appear patent without stenosis. SOFT TISSUES: Visualized paraspinal soft tissues appear unremarkable. 1. No acute fracture. No listhesis. 2. Other findings as described. LABS:  Results for orders placed or performed during the hospital encounter of 09/29/21   COVID-19, Rapid    Specimen: Nasopharyngeal Swab   Result Value Ref Range    Specimen Description . NASOPHARYNGEAL SWAB     SARS-CoV-2, Rapid Not Detected Not Detected   CBC Auto Differential   Result Value Ref Range    WBC 6.9 3.5 - 11.0 k/uL    RBC 4.95 4.5 - 5.9 m/uL    Hemoglobin 13.9 13.5 - 17.5 g/dL    Hematocrit 41.2 41 - 53 %    MCV 83.1 80 - 100 fL    MCH 28.0 26 - 34 pg    MCHC 33.7 31 - 37 g/dL    RDW 13.2 12.5 - 15.4 %    Platelets 060 351 - 067 k/uL    MPV 8.2 6.0 - 12.0 fL    NRBC Automated NOT REPORTED per 100 WBC    Differential Type NOT REPORTED     Seg Neutrophils 62 36 - 66 %    Lymphocytes 26 24 - 44 %    Monocytes 9 2 - 11 %    Eosinophils % 2 1 - 4 %    Basophils 1 0 - 2 % NEGATIVE NEGATIVE    Urobilinogen, Urine Normal Normal    Nitrite, Urine NEGATIVE NEGATIVE    Leukocyte Esterase, Urine NEGATIVE NEGATIVE    Urinalysis Comments       Microscopic exam not performed based on chemical results unless requested in original order. Urinalysis Comments          Urinalysis Comments       Utilizing a urinalysis as the only screening method to exclude a potential uropathogen can be unreliable in many patient populations. Rapid screening tests are less sensitive than culture and if UTI is a clinical possibility, culture should be considered despite a negative urinalysis.    Lactate, Sepsis   Result Value Ref Range    Lactic Acid, Sepsis 1.0 0.5 - 1.9 mmol/L    Lactic Acid, Sepsis, Whole Blood NOT REPORTED 0.5 - 1.9 mmol/L       EMERGENCY DEPARTMENT COURSE:        The patient was given the following medications:  Orders Placed This Encounter   Medications    0.9 % sodium chloride bolus    0.9 % sodium chloride infusion    orphenadrine (NORFLEX) injection 60 mg    morphine sulfate (PF) injection 4 mg    ketorolac (TORADOL) injection 30 mg    diphenhydrAMINE (BENADRYL) injection 25 mg    metoclopramide (REGLAN) injection 10 mg        Vitals:    Vitals:    09/29/21 2004 09/29/21 2006   BP:  (!) 165/107   Pulse: 75    Resp: 18    Temp:  98.6 °F (37 °C)   TempSrc: Oral Oral   SpO2: 99%    Weight: 90.7 kg (200 lb)    Height: 6' (1.829 m)      -------------------------  BP: (!) 165/107, Temp: 98.6 °F (37 °C), Pulse: 75, Resp: 18    CONSULTS:  None    CRITICAL CARE:   None    PROCEDURES:  LUMBAR PUNCTURE    Date/Time: 9/30/2021 12:00 AM  Performed by: Sarah Peoples DO  Authorized by: Sarah Peoples DO     Consent:     Consent obtained:  Verbal    Consent given by:  Patient    Risks discussed:  Bleeding, infection, pain, repeat procedure, nerve damage and headache    Alternatives discussed:  No treatment, delayed treatment, alternative treatment, observation and referral  Pre-procedure details:     Procedure purpose:  Diagnostic    Preparation: Patient was prepped and draped in usual sterile fashion    Anesthesia (see MAR for exact dosages): Anesthesia method:  Local infiltration    Local anesthetic:  Lidocaine 1% w/o epi  Procedure details:     Lumbar space:  L4-L5 interspace    Patient position:  L lateral decubitus    Needle gauge:  20    Needle type:  Cm Eliceo point    Needle length (in):  2.5    Ultrasound guidance: no      Number of attempts:  3  Post-procedure:     Puncture site:  Adhesive bandage applied    Patient tolerance of procedure: Tolerated well, no immediate complications  Comments:      Procedure terminated after 3 attempts          DIAGNOSIS/ MDM:   Sergey Lyons is a 51 y.o. male who presents with paresthesias and extremity weakness. Vital signs are stable. The patient has subjective paresthesias to his hands and legs. He has good strength and sensation on testing. Exam is otherwise unremarkable. Urinalysis, CBC, CMP, troponin, coags, ESR, CRP, lactic acid, and rapid Covid are unremarkable except for slight increase in creatinine to 1.3 which is likely from dehydration. He was treated with IV fluids. CT head, cervical spine, thoracic spine and lumbar spine are unremarkable. I attempted a lumbar puncture but was unsuccessful. I am concerned the patient is having progressively worsening symptoms that could be from a Guillain-Barré variant. He may also just have generalized deconditioning from a viral illness. The patient will need further evaluation of his symptoms. I spoke to the neurologist on-call for [facility], Dr. Jesus Hannon, at 12:54 AM who agrees with plan for admission and recommends obtaining a MRI of the cervical spine. I spoke to the [facility] hospitalist at [facility], Dr. Chacho Keith, who agrees to accept the patient for transfer. The patient will be transferred via ground unit. He and his wife have been updated.        FINAL IMPRESSION      1. Paresthesia of both lower extremities    2. Generalized weakness    3. Paresthesia of both hands    4.  Chronic bilateral low back pain with bilateral sciatica          DISPOSITION/PLAN   DISPOSITION Decision To Admit 09/29/2021 11:42:38 PM          (Please note that portions of this note were completed with a voice recognitionprogram.  Efforts were made to edit the dictations but occasionally words are mis-transcribed.)    Shaina Carrillo DO DO  Emergency Physician Attending         Shaina Carrillo DO  09/30/21 0216

## 2021-09-30 NOTE — PROGRESS NOTES
[facility] Pharmacy Service   Medication Review    The patient's list of current home medications has been reviewed. Source(s) of information: patient, [email or web address]    Based on information provided by the above source(s), I have updated the patient's home med list as described below. I changed or updated the following medications on the patient's home medication list:  Discontinued None      Added Joint Support Complex PO - 1QD  Ashwagandha PO - 1QD  Glycolax 17gm/scoop Powder - 1QD     Adjusted   None      Other Notes None            Please feel free to call me with any questions about this encounter. Thank you. This note will be reviewed and co-signed by the [facility] Pharmacist. The pharmacist will review inpatient orders and contact the physician about any discrepancies. Slick Chicas, pharmacy technician  Transitions [facility] Service  Phone:  [phone]  Fax: [phone]      Electronically signed by Slick Chicas on 9/30/2021 at 4:54 PM     Prior to Admission medications    Medication Sig Start Date End Date Taking?  Authorizing Provider   lisinopril (PRINIVIL;ZESTRIL) 40 MG tablet Take 40 mg by mouth daily       amLODIPine (NORVASC) 2.5 MG tablet Take 2.5 mg by mouth daily       atorvastatin (LIPITOR) 40 MG tablet Take 40 mg by mouth daily       Misc Natural Products (JOINT SUPPORT COMPLEX) CAPS Take 1 capsule by mouth daily       ASHWAGANDHA PO Take 1 tablet by mouth daily       polyethylene glycol (GLYCOLAX) 17 GM/SCOOP powder Take 17 g by mouth daily

## 2021-09-30 NOTE — PROGRESS NOTES
and legs. He states that it is difficult to even lift his arms above his head and when he was walking down the stairs he noticed that his legs seemed slightly uncoordinated. The patient reports that starting this evening he developed a gradual onset, constant, progressive, dull, achy, nonradiating, occipital headache with associated posterior cervical lymphadenopathy. The patient he does not list any provoking or palliating factors. His symptoms are constant and progressively getting worse. He denies any previous history of his current symptoms and denies any recent trauma or falls. The patient has had some associated nausea but denies any vomiting. He was Covid vaccinated in May 2021. The patient denies any history of back surgery, IV drug use, urinary/bowel incontinence or retention, saddle anesthesia. He denies any sick contacts. The patient denies recent fever, chills, vomiting, neck pain, chest pain, shortness of breath, abdominal pain, urinary/bowel symptoms, recent injury or falls. Pain:  Pain Assessment  Pain Level: 6    Assessment:  Pt presents with no apparent cognitive deficits at this time. No dysarthria noted, no oral motor deficits. No further ST is recommended. Verbal education provided. Recommendations:  Requires SLP Intervention: No  D/C Recommendations: No therapy recommended at discharge. Patient/family involved in developing goals and treatment plan: yes    Subjective:   Previous level of function and limitations:   General  Chart Reviewed: Yes  Family / Caregiver Present: No     Vision  Vision: Within Functional Limits  Hearing  Hearing: Within functional limits     Objective:  Oral/Motor  Oral Motor: Within functional limits    Expression  Primary Mode of Expression: Verbal    Motor Speech  Motor Speech:  Within Functional Limits    Cognition:   Orientation  Overall Orientation Status: Within Normal Limits  Memory  Memory: WFL  Short-term Memory:  (Delayed recall, 3 units: 3/3, 3/3)  Working Memory:  (immediate recall, 3 units: 3/3, 3/3; 5 units: 5/5)  Problem Solving  Problem Solving: Within Functional Limits  Abstract Reasoning  Abstract Reasoning: Within Functional Limits  Safety/Judgement  Safety/Judgement: Within Functional Limits  Verbal Sequencing: WFL  Word Associations: WFL    Prognosis:  Speech Therapy Prognosis  Prognosis: Fair  Individuals consulted  Consulted and agree with results and recommendations: Patient    Education:  Patient Education: yes  Patient Education Response: Verbalizes understanding          Therapy Time:   Individual Concurrent Group Co-treatment   Time In 0833         Time Out 0845         Minutes 12                 YOVANI Nath  9/30/2021 8:40 AM

## 2021-10-01 LAB
-: ABNORMAL
ALBUMIN SERPL-MCNC: 3.7 G/DL (ref 3.5–5.2)
ALBUMIN/GLOBULIN RATIO: NORMAL (ref 1–2.5)
ALP BLD-CCNC: 66 U/L (ref 40–129)
ALT SERPL-CCNC: 25 U/L (ref 5–41)
AMORPHOUS: ABNORMAL
ANION GAP SERPL CALCULATED.3IONS-SCNC: 11 MMOL/L (ref 9–17)
AST SERPL-CCNC: 17 U/L
BACTERIA: ABNORMAL
BILIRUB SERPL-MCNC: 0.54 MG/DL (ref 0.3–1.2)
BILIRUBIN DIRECT: 0.13 MG/DL
BILIRUBIN URINE: NEGATIVE
BILIRUBIN, INDIRECT: 0.41 MG/DL (ref 0–1)
BUN BLDV-MCNC: 10 MG/DL (ref 6–20)
BUN/CREAT BLD: 10 (ref 9–20)
CALCIUM SERPL-MCNC: 8.5 MG/DL (ref 8.6–10.4)
CASTS UA: ABNORMAL /LPF
CHLORIDE BLD-SCNC: 105 MMOL/L (ref 98–107)
CO2: 21 MMOL/L (ref 20–31)
COLOR: YELLOW
COMMENT UA: ABNORMAL
CREAT SERPL-MCNC: 1.05 MG/DL (ref 0.7–1.2)
CRYSTALS, UA: ABNORMAL /HPF
EPITHELIAL CELLS UA: ABNORMAL /HPF (ref 0–5)
GFR AFRICAN AMERICAN: >60 ML/MIN
GFR NON-AFRICAN AMERICAN: >60 ML/MIN
GFR SERPL CREATININE-BSD FRML MDRD: ABNORMAL ML/MIN/{1.73_M2}
GFR SERPL CREATININE-BSD FRML MDRD: ABNORMAL ML/MIN/{1.73_M2}
GLOBULIN: NORMAL G/DL (ref 1.5–3.8)
GLUCOSE BLD-MCNC: 100 MG/DL (ref 70–99)
GLUCOSE URINE: NEGATIVE
HCT VFR BLD CALC: 37.7 % (ref 40.7–50.3)
HEMOGLOBIN: 12.8 G/DL (ref 13–17)
KETONES, URINE: ABNORMAL
LEUKOCYTE ESTERASE, URINE: NEGATIVE
MCH RBC QN AUTO: 28 PG (ref 25.2–33.5)
MCHC RBC AUTO-ENTMCNC: 34 G/DL (ref 28.4–34.8)
MCV RBC AUTO: 82.5 FL (ref 82.6–102.9)
MUCUS: ABNORMAL
NITRITE, URINE: NEGATIVE
NRBC AUTOMATED: 0 PER 100 WBC
OTHER OBSERVATIONS UA: ABNORMAL
PDW BLD-RTO: 12 % (ref 11.8–14.4)
PH UA: 6 (ref 5–8)
PLATELET # BLD: 188 K/UL (ref 138–453)
PMV BLD AUTO: 10.1 FL (ref 8.1–13.5)
POTASSIUM SERPL-SCNC: 4.1 MMOL/L (ref 3.7–5.3)
PROTEIN UA: NEGATIVE
RBC # BLD: 4.57 M/UL (ref 4.21–5.77)
RBC UA: ABNORMAL /HPF (ref 0–2)
RENAL EPITHELIAL, UA: ABNORMAL /HPF
SODIUM BLD-SCNC: 137 MMOL/L (ref 135–144)
SPECIFIC GRAVITY UA: 1.02 (ref 1–1.03)
TOTAL PROTEIN: 6.4 G/DL (ref 6.4–8.3)
TRICHOMONAS: ABNORMAL
TURBIDITY: CLEAR
URINE HGB: ABNORMAL
UROBILINOGEN, URINE: NORMAL
WBC # BLD: 8.8 K/UL (ref 3.5–11.3)
WBC UA: ABNORMAL /HPF (ref 0–5)
YEAST: ABNORMAL

## 2021-10-01 PROCEDURE — 99232 SBSQ HOSP IP/OBS MODERATE 35: CPT | Performed by: PSYCHIATRY & NEUROLOGY

## 2021-10-01 PROCEDURE — 97530 THERAPEUTIC ACTIVITIES: CPT

## 2021-10-01 PROCEDURE — 36415 COLL VENOUS BLD VENIPUNCTURE: CPT

## 2021-10-01 PROCEDURE — 85027 COMPLETE CBC AUTOMATED: CPT

## 2021-10-01 PROCEDURE — 97535 SELF CARE MNGMENT TRAINING: CPT

## 2021-10-01 PROCEDURE — 6370000000 HC RX 637 (ALT 250 FOR IP): Performed by: PSYCHIATRY & NEUROLOGY

## 2021-10-01 PROCEDURE — 2060000000 HC ICU INTERMEDIATE R&B

## 2021-10-01 PROCEDURE — 6360000002 HC RX W HCPCS: Performed by: FAMILY MEDICINE

## 2021-10-01 PROCEDURE — 80048 BASIC METABOLIC PNL TOTAL CA: CPT

## 2021-10-01 PROCEDURE — 97166 OT EVAL MOD COMPLEX 45 MIN: CPT

## 2021-10-01 PROCEDURE — 6370000000 HC RX 637 (ALT 250 FOR IP): Performed by: FAMILY MEDICINE

## 2021-10-01 PROCEDURE — 6360000002 HC RX W HCPCS: Performed by: PSYCHIATRY & NEUROLOGY

## 2021-10-01 PROCEDURE — 97161 PT EVAL LOW COMPLEX 20 MIN: CPT

## 2021-10-01 PROCEDURE — 2580000003 HC RX 258: Performed by: EMERGENCY MEDICINE

## 2021-10-01 PROCEDURE — 80076 HEPATIC FUNCTION PANEL: CPT

## 2021-10-01 RX ORDER — TIZANIDINE 2 MG/1
2 TABLET ORAL 3 TIMES DAILY
Status: DISCONTINUED | OUTPATIENT
Start: 2021-10-01 | End: 2021-10-02

## 2021-10-01 RX ORDER — GABAPENTIN 300 MG/1
300 CAPSULE ORAL NIGHTLY
Status: DISCONTINUED | OUTPATIENT
Start: 2021-10-01 | End: 2021-10-03

## 2021-10-01 RX ADMIN — SODIUM CHLORIDE 1000 ML: 9 INJECTION, SOLUTION INTRAVENOUS at 03:30

## 2021-10-01 RX ADMIN — SODIUM CHLORIDE 1000 ML: 9 INJECTION, SOLUTION INTRAVENOUS at 21:32

## 2021-10-01 RX ADMIN — TIZANIDINE 2 MG: 2 TABLET ORAL at 13:59

## 2021-10-01 RX ADMIN — TIZANIDINE 2 MG: 2 TABLET ORAL at 20:12

## 2021-10-01 RX ADMIN — HYDROCODONE BITARTRATE AND ACETAMINOPHEN 1 TABLET: 5; 325 TABLET ORAL at 20:10

## 2021-10-01 RX ADMIN — ZOLPIDEM TARTRATE 5 MG: 5 TABLET ORAL at 23:49

## 2021-10-01 RX ADMIN — IMMUNE GLOBULIN (HUMAN) 35 G: 10 INJECTION INTRAVENOUS; SUBCUTANEOUS at 10:07

## 2021-10-01 RX ADMIN — HYDROCODONE BITARTRATE AND ACETAMINOPHEN 1 TABLET: 5; 325 TABLET ORAL at 02:30

## 2021-10-01 RX ADMIN — HYDROCODONE BITARTRATE AND ACETAMINOPHEN 1 TABLET: 5; 325 TABLET ORAL at 13:07

## 2021-10-01 RX ADMIN — ATORVASTATIN CALCIUM 40 MG: 40 TABLET, FILM COATED ORAL at 20:10

## 2021-10-01 RX ADMIN — ASPIRIN 81 MG: 81 TABLET, COATED ORAL at 09:02

## 2021-10-01 RX ADMIN — ONDANSETRON 4 MG: 2 INJECTION INTRAMUSCULAR; INTRAVENOUS at 23:34

## 2021-10-01 RX ADMIN — ENOXAPARIN SODIUM 40 MG: 40 INJECTION SUBCUTANEOUS at 09:02

## 2021-10-01 RX ADMIN — GABAPENTIN 300 MG: 300 CAPSULE ORAL at 20:44

## 2021-10-01 ASSESSMENT — PAIN SCALES - GENERAL
PAINLEVEL_OUTOF10: 6
PAINLEVEL_OUTOF10: 5
PAINLEVEL_OUTOF10: 8
PAINLEVEL_OUTOF10: 5
PAINLEVEL_OUTOF10: 6

## 2021-10-01 ASSESSMENT — PAIN DESCRIPTION - LOCATION
LOCATION: BACK
LOCATION: BACK

## 2021-10-01 ASSESSMENT — PAIN DESCRIPTION - PAIN TYPE: TYPE: ACUTE PAIN

## 2021-10-01 ASSESSMENT — PAIN DESCRIPTION - ORIENTATION: ORIENTATION: RIGHT;MID

## 2021-10-01 NOTE — FLOWSHEET NOTE
09/30/21 2207   Provider Notification   Reason for Communication Review case   Provider Name Adil   Provider Notification Physician   Method of Communication Page   Response Waiting for response     Notified of pt complaint of low back pain that has been ongoing since symptom onset, new orders received.

## 2021-10-01 NOTE — PROGRESS NOTES
Bed/Bath upstairs, 1/2 bath on main level  Home Access: Stairs to enter without rails  Entrance Stairs - Number of Steps: 2-3  Bathroom Shower/Tub: Walk-in shower  Bathroom Toilet: Standard  Bathroom Equipment: Built-in shower seat  ADL Assistance: Independent  Homemaking Assistance: Independent  Ambulation Assistance: Independent  Transfer Assistance: Independent  Active : Yes  Mode of Transportation: Car  Occupation: Full time employment  Type of occupation:   Leisure & Hobbies: pool  Additional Comments: pt fully independent  Cognition        Objective          AROM RLE (degrees)  RLE AROM: WNL  AROM LLE (degrees)  LLE AROM : WNL  AROM RUE (degrees)  RUE General AROM: See OT eval for B UE ROM  Strength RLE  Strength RLE: WNL  Comment: B LE's 4/5,ankles 4+/5  Strength LLE  Strength LLE: WNL  Strength RUE  Comment: See OT eval for B UE MMT  Tone RLE  RLE Tone: Normotonic  Tone LLE  LLE Tone: Normotonic  Motor Control  Gross Motor?: WNL     Bed mobility  Rolling to Left: Supervision  Rolling to Right: Supervision  Scooting: Minimal assistance  Transfers  Sit to Stand:  Moderate Assistance;2 Person Assistance (Mod A x 2 with Sharolyn Ego, Max A x 2 w/ RW and pt unable to achieve standing w/ RW)  Stand to sit: Moderate Assistance;2 Person Assistance  Ambulation  Ambulation?: No  Stairs/Curb  Stairs?: No     Balance  Posture: Good  Sitting - Static: Good  Sitting - Dynamic: Good  Standing - Static: Fair;+ (w/ Sharolyn Ego)  Standing - Dynamic: [address]  Times per week: 1-2x/day,5-6 days/week  Current Treatment Recommendations: Strengthening, Balance Training, Endurance Training, Transfer Training, Gait Training, Functional Mobility Training, Stair training, Neuromuscular Re-education  Safety Devices  Type of devices: Gait belt, Bed alarm in place, Left in bed, Call light within reach  Restraints  Initially in place: No    G-Code       OutComes Score AM-PAC Score  AM-PAC Inpatient Mobility Raw Score : 11 (10/01/21 1503)  AM-PAC Inpatient T-Scale Score : 33.86 (10/01/21 1503)  Mobility Inpatient CMS 0-100% Score: 72.57 (10/01/21 1503)  Mobility Inpatient CMS G-Code Modifier : CL (10/01/21 1503)          Goals  Short term goals  Time Frame for Short term goals: 12 treatments  Short term goal 1: Independent bed mobility/transfers  Short term goal 2: Independent ambulation 200' x 1  Short term goal 3: Independently ascend/descend 1 flight of steps  Short term goal 4: Good standing balance  Short term goal 5: Tolerate 30 min ther act/ 5/5 B LE's  Patient Goals   Patient goals : Get strength back       Therapy Time   Individual Concurrent Group Co-treatment   Time In 1         Time Out 5576         Minutes 55           Co-treatment with OT warranted secondary to decreased safety and independence requiring 2 skilled therapy professionals to address individual discipline's goals.          Stevan Patterson, PT

## 2021-10-01 NOTE — PROGRESS NOTES
Occupational Therapy   Occupational Therapy Initial Assessment  Date: 10/1/2021   Patient Name: Amarjit Ivan  MRN: [id number]     : 1975    Date of Service: 10/1/2021    Discharge Recommendations:  Patient would benefit from continued therapy after discharge Pt is currently functional below baseline and would suggest intense therapy post acute care. Would expect patient to be able to tolerate 3 hours of therapy per day and able to tolerate at least one hour up in chair. Please refer to AM-PAC score for current mobility/adl level. Amarjit Ivan is a 51 y.o. male who presents for evaluation of extremity weakness and paresthesias. The patient reports that he has history of chronic back pain and a MRI of his lumbar spine several years ago showed a L5/S1 herniated disc. The patient reports that he will intermittently have right-sided sciatica but this is normally well managed with NSAIDs, muscle relaxers and by seeing his chiropractor. The patient also has history of chronic pain to his right lower quadrant abdomen since he had a right inguinal hernia repair but he recently had a CT scan of his abdomen pelvis that was negative for any acute process. The patient reports that on 9/15/2021 he developed flulike symptoms with fever, chills and myalgias. His symptoms lasted for approximately 2 days and he was tested on 2021 for COVID-19 and was negative. The patient reports that starting on 2021 he woke up with numbness to the back of his bilateral legs. He states that he had taken a muscle relaxer the night before for his chronic low back pain but has never had numbness or tingling after taking that medicine. The patient reports that over the past few days he has developed gradual onset, constant, progressive, numbness to his legs with associated bilateral leg pain. He states that he has had difficulty sleeping for the past 2 nights secondary to his symptoms.   Starting this morning the patient woke up with numbness to his bilateral hands. He has also noticed generalized weakness to his arms and legs. He states that it is difficult to even lift his arms above his head and when he was walking down the stairs he noticed that his legs seemed slightly uncoordinated. The patient reports that starting this evening he developed a gradual onset, constant, progressive, dull, achy, nonradiating, occipital headache with associated posterior cervical lymphadenopathy. The patient he does not list any provoking or palliating factors. His symptoms are constant and progressively getting worse. He denies any previous history of his current symptoms and denies any recent trauma or falls. The patient has had some associated nausea but denies any vomiting. He was Covid vaccinated in May 2021. The patient denies any history of back surgery, IV drug use, urinary/bowel incontinence or retention, saddle anesthesia. He denies any sick contacts. The patient denies recent fever, chills, vomiting, neck pain, chest pain, shortness of breath, abdominal pain, urinary/bowel symptoms, recent injury or falls. RN reports patient is medically stable for therapy treatment this date. Chart reviewed prior to treatment and patient is agreeable for therapy. All lines intact and patient positioned comfortably at end of treatment. All patient needs addressed prior to ending therapy session. Assessment   Performance deficits / Impairments: Decreased functional mobility ; Decreased ADL status; Decreased strength;Decreased safe awareness;Decreased endurance;Decreased sensation;Decreased balance;Decreased coordination;Decreased fine motor control  Assessment: .Skilled OT is indicated to increase overall ROM, strength, balance, act alfonzo as well as I/safety awareness in function to return home with assist as needed.   Prognosis: Good  Decision Making: Medium Complexity  OT Education: OT Role;Plan of Care;Home Exercise Independent  Active : Yes  Mode of Transportation: Car  Occupation: Full time employment  Type of occupation:   Leisure & Hobbies: pool  Additional Comments: pt fully independent       Objective   Vision: Impaired  Vision Exceptions: Wears glasses at all times  Hearing: Within functional limits    Orientation  Overall Orientation Status: Within Normal Limits  Observation/Palpation  Posture: Good  Observation: Pt lying in bed, agreeable to session. Very pleasant and cooperative  Balance  Sitting Balance: Stand by assistance  Standing Balance: Moderate assistance (x2 in Bhagat Prior. Unable to stand at walker this session d/t legs buckling.)  Standing Balance  Time: pt stood in Bhagat Prior x 2 min; able to wt shift somewhat and appreciates being upright. Functional Mobility  Functional Mobility Comments: [place] Prior to move pt up higher in bed. ADL  Feeding: Setup; Independent (some weakness noted with self feeding)  Grooming: Setup;Supervision  UE Bathing: Minimal assistance  LE Bathing: Moderate assistance  UE Dressing: Minimal assistance  LE Dressing: Maximum assistance; Moderate assistance  Toileting: Stand by assistance (pt able to use urinal withSBA at EOB. Pt would need inc assist for BM)  Additional Comments: Pt is able to complete tasks at seated level with ~min A. In standing, pt needing UE support on Bhagat Prior with LEs unsteady/shaky at times. Tone RUE  RUE Tone: Normotonic  Tone LUE  LUE Tone: Normotonic  Coordination  Movements Are Fluid And Coordinated: No  Coordination and Movement description: Fine motor impairments;Gross motor impairments;Right UE;Left UE;Decreased accuracy; Decreased speed  Quality of Movement Other  Comment: pt with more proximal/shoulder weakness but does have significantly dec.  strength and dec. coordination     Bed mobility  Supine to Sit: Minimal assistance  Sit to Supine: Minimal assistance  Transfers  Sit to stand: 2 Person assistance;Maximum assistance  Stand to sit: Maximum assistance;2 Person assistance  Transfer Comments: attempted sit to stand with RW and pt unable to achieve standing position despite max A x 2. Pt's LEs not holding him and pt lacking strength to push up to stand. Pt then trialed with Valeda Bee and needed mod-max X 2 with bed raised but able to achieve full stand.      Cognition  Overall Cognitive Status: WFL  Perception  Overall Perceptual Status: [facility]     Sensation  Overall Sensation Status: Impaired (numbness in hands and feet.)        LUE AROM (degrees)  LUE AROM : WFL  LUE General AROM: Pt has full ROM, but is slow to achieve full range, suma with shoulders  RUE AROM (degrees)  RUE AROM : WFL  LUE Strength  Gross LUE Strength: Exceptions to [facility]  LUE Strength Comment: B shoulders 3+/5 flexion, 4-/5 extension, elbows 4-/5, wrists 4-/5, hands 3+/5  RUE Strength  Gross RUE Strength: Exceptions to [address] [place] [address]  Times per week: 4-5x/week, 1-2x/day  Current Treatment Recommendations: Strengthening, Balance Training, Functional Mobility Training, Endurance Training, Safety Education & Training, Self-Care / ADL, Patient/Caregiver Education & Training, Equipment Evaluation, Education, & procurement, Positioning       AM-PAC Inpatient Daily Activity Raw Score: 14 (10/01/21 1504)  AM-PAC Inpatient ADL T-Scale Score : 33.39 (10/01/21 1504)  ADL Inpatient CMS 0-100% Score: 59.67 (10/01/21 1504)  ADL Inpatient CMS G-Code Modifier : CK (10/01/21 1504)    Goals  Short term goals  Time Frame for Short term goals: by discharge, pt will  Short term goal 1: demo CGA with ADL transfers with approp AD/DME and good safety  Short term goal 2: demo and verb good understanding of ed provided on fall prevention techs, equip needs, d/c recommendations, and EC/WS techs  Short term goal 3: demo CGA with toileting routine at approp level with good safety, DME as needed  Short term goal 4: demo SBA with UB ADls and min A LB ADLs with DME as needed and good safety/pacing  Short term goal 5: demo I/S with B UE HEP for inc strength in all muscle groups for inc. functional strength to complete ADLs and functional transfers  Long term goals  Long term goal 1: demo CGA with functional mob in room distances with good safety/pacing for ADL completion with approp AD  Patient Goals   Patient goals : to return home, full function       Therapy Time   Individual Concurrent Group Co-treatment   Time In 1137         Time Out 1233         Minutes 56          treatment min: 55    Co-treatment with PT warranted secondary to decreased safety and independence requiring 2 skilled therapy professionals to address individual discipline's goals. OT addressing preparation for ADL transfer, sitting balance for increased ADL performance, sitting/activity tolerance, functional reaching, environmental safety/scanning, fall prevention, ability to sequence and follow directions and functional UE strength.        Madison Hatchet, OT

## 2021-10-01 NOTE — PROGRESS NOTES
This is a 50 y/o previously healthy WM who presented with 4 days of progressive numbness and weakness following a viral illness about 2 weeks ago. He is also having significant back pain. 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 Subjective:  He reports felling about the same today. His legs arms still feel quite week but no bulbar symptoms. He did sleep poorly again last night stating he could not get comfortable. NIFs have been greater than negative 40,    Presently feeding himself lunch without difficulty. Past Medical History:   Diagnosis Date    Herniation of intervertebral disc between L5 and S1     Low back pain     Sciatica        Past Surgical History:   Procedure Laterality Date    HERNIA REPAIR Right     inguinal       History reviewed. No pertinent family history. Social History     Socioeconomic History    Marital status:      Spouse name: None    Number of children: None    Years of education: None    Highest education level: None   Occupational History    None   Tobacco Use    Smoking status: Former Smoker     Quit date: 2014     Years since quittin.0    Smokeless tobacco: Never Used   Substance and Sexual Activity    Alcohol use: Not Currently    Drug use: Not Currently    Sexual activity: None   Other Topics Concern    None   Social History Narrative    None     Social Determinants of Health     Financial Resource Strain:     Difficulty of Paying Living Expenses:    Food Insecurity:     Worried About Running Out of Food in the Last Year:     Ran Out of Food in the Last Year:    Transportation Needs:     Lack of Transportation (Medical):      Lack of Transportation (Non-Medical):    Physical Activity:     Days of Exercise per Week:     Minutes of Exercise per Session:    Stress:     Feeling of Stress :    Social Connections:     Frequency of Communication with Friends and Family:     Frequency of Social Gatherings with Friends and Family:     Attends Anabaptist Services:     Active Member of Clubs or Organizations:     Attends Club or Organization Meetings:     Marital Status:    Intimate Partner Violence:     Fear of Current or Ex-Partner:     Emotionally Abused:     Physically Abused:     Sexually Abused:        Current Facility-Administered Medications   Medication Dose Route Frequency Provider Last Rate Last Admin    sodium chloride flush 0.9 % injection 10 mL  10 mL IntraVENous 2 times per day Polo Park MD        sodium chloride flush 0.9 % injection 10 mL  10 mL IntraVENous PRN Albaro Spencer MD        0.9 % sodium chloride infusion  25 mL IntraVENous PRN Albaro Spencer MD        ondansetron (ZOFRAN-ODT) disintegrating tablet 4 mg  4 mg Oral Q8H PRN Albaro Spencer MD   4 mg at 09/30/21 1514    Or    ondansetron (ZOFRAN) injection 4 mg  4 mg IntraVENous Q6H PRN Albaro Spencer MD        magnesium hydroxide (MILK OF MAGNESIA) 400 MG/5ML suspension 30 mL  30 mL Oral Daily PRN Albaro Spencer MD        enoxaparin (LOVENOX) injection 40 mg  40 mg SubCUTAneous Daily Albaro Spencer MD   40 mg at 10/01/21 0902    aspirin EC tablet 81 mg  81 mg Oral Daily Albaro Spencer MD   81 mg at 10/01/21 7088    Or    aspirin suppository 300 mg  300 mg Rectal Daily Albaro Spencer MD        atorvastatin (LIPITOR) tablet 40 mg  40 mg Oral Nightly Albaro Spencer MD   40 mg at 09/30/21 2253    immune globulin (GAMUNEX-C) 10% solution 35 g  35 g IntraVENous Daily Hazel Mercado  mL/hr at 10/01/21 1007 35 g at 10/01/21 1007    acetaminophen (TYLENOL) tablet 650 mg  650 mg Oral Q6H PRN Hazel Mercado MD        diphenhydrAMINE (BENADRYL) tablet 25 mg  25 mg Oral Q6H PRN Hazel Mercado MD        zolpidem [facility]) tablet 5 mg  5 mg Oral Nightly PRN Hazel Mercado MD   5 mg at 09/30/21 2253    HYDROcodone-acetaminophen (NORCO) 5-325 MG per tablet 1 tablet  1 tablet Oral Q4H PRN Polo Park MD   1 tablet at 10/01/21 3556    0.9 % sodium chloride infusion  1,000 mL IntraVENous Continuous Collin Cover,  mL/hr at 10/01/21 0330 1,000 mL at 10/01/21 0330       No Known Allergies        Vitals:    10/01/21 1200   BP:    Pulse:    Resp: 18   Temp: 97.7 °F (36.5 °C)   SpO2:      Admission weight: 200 lb (90.7 kg)    General Appearance: In some discomfort with moving around     Mental Status Exam:             Level of Alertness:   awake            Orientation:   person, place, time            Memory:   normal            Fund of Knowledge:  normal            Attention/Concentration:  normal            Language:  normal        Cranial Nerves     PERRL, EOMI/F, no ptosis, face symmetric to movement and sensation, no dysarthria or dysphonia     Motor:    Deltoids: 4/5  Triceps/biceps: 4/5  HF: 4/5  KE: 4/5  KF: 4/5  DF: 4+/5        Sensory:  Vibratory sense appears quite intact distally in upper and lower extremities      Assessment : This is a 52 y/o with 4 days of progressive and ascending sensory complaints and weakness which occurred 2 weeks after a viral syndrome. His exam is significant for proximal weakness which is worse in the arms, and areflexia. 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 Plan:    1. Continue IVIG for total of 2g/kg  2. If he remains stable overnight I think he could transfer out of the ICU tomorrow  3.  Will add 300mg gabapentin tonight

## 2021-10-01 NOTE — PROGRESS NOTES
[facility]    Adult [facility]ist      Name: Chele Simmons  MRN: [id number]     Acct: [de-identified]  Room: 2041/2041-01    Admit Date: 9/29/2021  8:10 PM  PCP: Araceli Rajan MD    Primary Problem  Active Problems:    Leg paresthesia  Resolved Problems:    * No resolved hospital problems. *        Assesment:     · Guillain Brielle syndrome  · Upper and lower extremity weakness  · Paresthesias  · Lumbar arthritis  · Low back pain  · Lumbar herniated disc  · Overweight  · Hypoxia  · Fall  · Low back pain        Plan:     · Admit to Med Surg  · Monitor vitals closely  · Keep SPO2 above 90%  · I's and O's  · IV  · Pain control  · Neurochecks every 4  · Antiemetics as needed  · MRI brain  · MRI cervical spine  · Lumbar puncture  · Consult neurology  · ASA, Lipitor  · CBC, CMP  · UA reflux  · PT OT  · Swallow study  · Check CK, myoglobin, aldolase  · IVIG per neurology  · Gabapentin  · Norco as needed  · Tizanidine as needed  · DVT and GI prophylaxis. Chief Complaint:     Chief Complaint   Patient presents with    Extremity Weakness    Hand Numbness         History of Present Illness:        Patient seen and examined at bedside  Patient has had ongoing weakness of both upper and lower extremities he tried to ambulate yesterday when he fell without hitting his neck or back anywhere   patient caught himself at that time  Neurochecks have been conducted  Patient has denied any vision change  Denies cough or wheezing  There has been hypoxia  Denies orthopnea, neck pain  Denies urinary difficulty or bowel problems  Other review of system negative      Chele Simmons is a 51 y.o.  male who presents with Extremity Weakness and Hand Numbness    Patient admitted through the [facility] ED where he presented with complaints of paresthesias that started off over his back radiating to the back of his thighs.   Patient says he later noticed paresthesias which were patchy over the lower extremities but most predominant over his distal foot and toes. This started 3 days ago and he was woken up with the numbness on his back and bilateral lower extremities. Patient says he took some muscle relaxant and his symptoms improved. Patient says since yesterday his symptoms reappeared and have been more progressive. Patient says he has had similar feelings in his hands. In addition he feels his lower and upper extremities have been weaker. Patient says it has been more difficult to go up and down stairs. He has not been able to elevate his arms above his head and has not been able to comb his hair. Patient complains of weakness in his thighs associated with a dull ache. Patient also complains of a dull mild to moderate and progressive nonradiating occipital headache. Patient says he had a URI almost 2 weeks ago. He was tested for COVID-19 which was negative. Complains of intermittent nausea. Denies abdominal pain or vomiting. Denies fever, chills, joint swelling or rash. Denies chest pain, dyspnea or orthopnea. Denies saddle anesthesia, urinary or bowel dysfunction. Denies any fall or trauma to the back or head. I have personally reviewed the past medical history, past surgical history, medications, social history, and family history, and summarized in the note. Review of Systems:     All 10 point system is reviewed and negative otherwise mentioned in HPI. Past Medical History:     Past Medical History:   Diagnosis Date    Herniation of intervertebral disc between L5 and S1     Low back pain     Sciatica         Past Surgical History:     Past Surgical History:   Procedure Laterality Date    HERNIA REPAIR Right     inguinal        Medications Prior to Admission:       Prior to Admission medications    Medication Sig Start Date End Date Taking?  Authorizing Provider   lisinopril (PRINIVIL;ZESTRIL) 40 MG tablet Take 40 mg by mouth daily   Yes Historical Provider, MD   amLODIPine (NORVASC) 2.5 MG tablet Take 2.5 mg by mouth daily   Yes Historical Provider, MD   atorvastatin (LIPITOR) 40 MG tablet Take 40 mg by mouth daily   Yes Historical Provider, MD   Misc Natural Products (JOINT SUPPORT COMPLEX) CAPS Take 1 capsule by mouth daily   Yes Historical Provider, MD   ASHWAGANDHA PO Take 1 tablet by mouth daily   Yes Historical Provider, MD   polyethylene glycol (GLYCOLAX) 17 GM/SCOOP powder Take 17 g by mouth daily   Yes Historical Provider, MD        Allergies:       Patient has no known allergies. Social History:     Tobacco:    reports that he quit smoking about 7 years ago. He has never used smokeless tobacco.  Alcohol:      reports previous alcohol use. Drug Use:  reports previous drug use. Family History:     History reviewed. No pertinent family history. Physical Exam:     Vitals:  /84   Pulse 56   Temp 97.7 °F (36.5 °C) (Temporal)   Resp 18   Ht 6' (1.829 m)   Wt 200 lb (90.7 kg)   SpO2 100%   BMI 27.12 kg/m²   Temp (24hrs), Av.7 °F (36.5 °C), Min:97 °F (36.1 °C), Max:98.1 °F (36.7 °C)          General appearance - alert, well appearing, and in no acute distress  Mental status - oriented to person, place, and time with normal affect  Head - normocephalic and atraumatic  Eyes - pupils equal and reactive, extraocular eye movements intact, conjunctiva clear  Ears - hearing appears to be intact  Nose - no drainage noted  Mouth - mucous membranes moist  Neck - supple, no carotid bruits, thyroid not palpable  Chest - clear to auscultation, normal effort  Heart - normal rate, regular rhythm, no murmur  Abdomen - soft, nontender, nondistended, bowel sounds present all four quadrants, no masses, hepatomegaly or splenomegaly  Neurological - normal speech, significant weakness of flexor and extensor muscle groups over bilateral upper and bilateral lower extremities. cranial nerves II through XII grossly intact.   Reduced sensations bilateral distal feet and all fingers  Extremities - peripheral pulses palpable, no pedal edema or calf pain with palpation  Skin - no gross lesions, rashes, or induration noted        Data:     Labs:    Hematology:  Recent Labs     09/29/21  2128 10/01/21  0434   WBC 6.9 8.8   RBC 4.95 4.57   HGB 13.9 12.8*   HCT 41.2 37.7*   MCV 83.1 82.5*   MCH 28.0 28.0   MCHC 33.7 34.0   RDW 13.2 12.0    188   MPV 8.2 10.1   SEDRATE <1  --    CRP <3.0  --    INR 1.1  --      Chemistry:  Recent Labs     09/29/21  2128 09/30/21  0914 10/01/21  0434     --  137   K 4.1  --  4.1     --  105   CO2 26  --  21   GLUCOSE 120*  --  100*   BUN 17  --  10   CREATININE 1.30*  --  1.05   ANIONGAP 10  --  11   LABGLOM 59*  --  >60   GFRAA >60  --  >60   CALCIUM 9.6  --  8.5*   TROPHS <6  --   --    CKTOTAL  --  158  --    MYOGLOBIN  --  66  --      Recent Labs     09/29/21  2128 09/30/21  2040 10/01/21  0434   PROT 7.0  --  6.4   LABALBU 4.5  --  3.7   AST 18  --  17   ALT 29  --  25   ALKPHOS 72  --  66   BILITOT 0.40  --  0.54   BILIDIR  --   --  0.13   POCGLU  --  97  --        Lab Results   Component Value Date    INR 1.1 09/29/2021    PROTIME 11.0 09/29/2021       No results found for: SPECIAL  No results found for: CULTURE    No results found for: POCPH, PHART, PH, POCPCO2, JBC8VJA, PCO2, POCPO2, PO2ART, PO2, POCHCO3, NHA1FNG, HCO3, NBEA, PBEA, BEART, BE, THGBART, THB, DEL4LTH, SZAZ5FLN, K9FMXSHG, O2SAT, FIO2    Radiology:    CT Head WO Contrast    Result Date: 9/29/2021  No acute intracranial abnormality. CT CERVICAL SPINE WO CONTRAST    Result Date: 9/29/2021  No acute abnormality of the cervical spine. CT THORACIC SPINE WO CONTRAST    Result Date: 9/29/2021  1. No large disc herniation or protrusion by CT. 2. Other findings as described. CT Lumbar Spine WO Contrast    Result Date: 9/29/2021  1. No acute fracture. No listhesis. 2. Other findings as described.          All radiological studies reviewed                Code Status:  Full Code    Electronically signed by Kirk Veloz MD on 10/1/2021 at 12:54 PM     Copy sent to Dr. Juanjo Melgar MD    This note was created with the assistance of a speech-recognition program.  Although the intention is to generate a document that actually reflects the content of the visit, no guarantees can be provided that every mistake has been identified and corrected by editing. Note was updated later by me after  physical examination and  completion of the assessment.

## 2021-10-01 NOTE — PROGRESS NOTES
Writer was walking down hallway when she heard someone yell help. Writer opened patients door to find patient on floor right next to the bed in a sitting position. Writer called out for assistance. Writer, pct and 2nd RN helped patient to side of bed and obtained vitals. Vitals stable. Patients nurse informed of fall. Patient denied hitting his head and stated he slide onto the floor when attempting to stand. House Supervisor, charge nurse and Dr. Benjamin Nielsen notified of fall.

## 2021-10-01 NOTE — PROGRESS NOTES
Patient on 2L NC with SpO2 98% and EtCO2 33   NIF performed with > -40 cmH2O  Will continue to monitor

## 2021-10-01 NOTE — PROGRESS NOTES
Patient on 2L NC with SpO2 98% and EtCO2 33  NIF performed was > -40 cmH2O  Will continue to monitor

## 2021-10-01 NOTE — FLOWSHEET NOTE
Patient sleeping; no family present. Writer prays for patient; leaves note of support on tray table. Spiritual Care will follow as needed.      10/01/21 1132   Encounter Summary   Services provided to: Patient   Referral/Consult From: [facility]   Continue Visiting   (10/1/21 sleeping)   Complexity of Encounter Low   Length of Encounter 15 minutes   Routine   Type Initial   Assessment Sleeping   Intervention Prayer

## 2021-10-02 ENCOUNTER — APPOINTMENT (OUTPATIENT)
Dept: CT IMAGING | Age: 46
DRG: 096 | End: 2021-10-02
Payer: COMMERCIAL

## 2021-10-02 LAB
ALDOLASE: 3.8 U/L (ref 1.5–8.1)
ANION GAP SERPL CALCULATED.3IONS-SCNC: 12 MMOL/L (ref 9–17)
BUN BLDV-MCNC: 8 MG/DL (ref 6–20)
BUN/CREAT BLD: 8 (ref 9–20)
CALCIUM SERPL-MCNC: 8.5 MG/DL (ref 8.6–10.4)
CHLORIDE BLD-SCNC: 104 MMOL/L (ref 98–107)
CO2: 21 MMOL/L (ref 20–31)
CREAT SERPL-MCNC: 0.99 MG/DL (ref 0.7–1.2)
GFR AFRICAN AMERICAN: >60 ML/MIN
GFR NON-AFRICAN AMERICAN: >60 ML/MIN
GFR SERPL CREATININE-BSD FRML MDRD: ABNORMAL ML/MIN/{1.73_M2}
GFR SERPL CREATININE-BSD FRML MDRD: ABNORMAL ML/MIN/{1.73_M2}
GLUCOSE BLD-MCNC: 96 MG/DL (ref 75–110)
GLUCOSE BLD-MCNC: 97 MG/DL (ref 70–99)
POTASSIUM SERPL-SCNC: 3.7 MMOL/L (ref 3.7–5.3)
SODIUM BLD-SCNC: 137 MMOL/L (ref 135–144)

## 2021-10-02 PROCEDURE — 6360000002 HC RX W HCPCS: Performed by: FAMILY MEDICINE

## 2021-10-02 PROCEDURE — 6360000002 HC RX W HCPCS: Performed by: PSYCHIATRY & NEUROLOGY

## 2021-10-02 PROCEDURE — 97116 GAIT TRAINING THERAPY: CPT

## 2021-10-02 PROCEDURE — 6370000000 HC RX 637 (ALT 250 FOR IP): Performed by: PSYCHIATRY & NEUROLOGY

## 2021-10-02 PROCEDURE — 2060000000 HC ICU INTERMEDIATE R&B

## 2021-10-02 PROCEDURE — 99232 SBSQ HOSP IP/OBS MODERATE 35: CPT | Performed by: PSYCHIATRY & NEUROLOGY

## 2021-10-02 PROCEDURE — 80048 BASIC METABOLIC PNL TOTAL CA: CPT

## 2021-10-02 PROCEDURE — 36415 COLL VENOUS BLD VENIPUNCTURE: CPT

## 2021-10-02 PROCEDURE — 97110 THERAPEUTIC EXERCISES: CPT

## 2021-10-02 PROCEDURE — 2580000003 HC RX 258: Performed by: EMERGENCY MEDICINE

## 2021-10-02 PROCEDURE — 82947 ASSAY GLUCOSE BLOOD QUANT: CPT

## 2021-10-02 PROCEDURE — 94761 N-INVAS EAR/PLS OXIMETRY MLT: CPT

## 2021-10-02 PROCEDURE — 2580000003 HC RX 258: Performed by: FAMILY MEDICINE

## 2021-10-02 PROCEDURE — 6370000000 HC RX 637 (ALT 250 FOR IP): Performed by: FAMILY MEDICINE

## 2021-10-02 PROCEDURE — 2700000000 HC OXYGEN THERAPY PER DAY

## 2021-10-02 RX ORDER — AMLODIPINE BESYLATE 2.5 MG/1
2.5 TABLET ORAL DAILY
Status: DISCONTINUED | OUTPATIENT
Start: 2021-10-02 | End: 2021-10-04

## 2021-10-02 RX ORDER — LISINOPRIL 40 MG/1
40 TABLET ORAL DAILY
Status: DISCONTINUED | OUTPATIENT
Start: 2021-10-02 | End: 2021-10-05

## 2021-10-02 RX ORDER — ATORVASTATIN CALCIUM 40 MG/1
40 TABLET, FILM COATED ORAL NIGHTLY
Status: DISCONTINUED | OUTPATIENT
Start: 2021-10-02 | End: 2021-10-02

## 2021-10-02 RX ADMIN — ONDANSETRON 4 MG: 2 INJECTION INTRAMUSCULAR; INTRAVENOUS at 13:40

## 2021-10-02 RX ADMIN — TIZANIDINE 2 MG: 2 TABLET ORAL at 13:40

## 2021-10-02 RX ADMIN — SODIUM CHLORIDE 1000 ML: 9 INJECTION, SOLUTION INTRAVENOUS at 04:57

## 2021-10-02 RX ADMIN — SODIUM CHLORIDE 25 ML: 9 INJECTION, SOLUTION INTRAVENOUS at 13:39

## 2021-10-02 RX ADMIN — SODIUM CHLORIDE, PRESERVATIVE FREE 10 ML: 5 INJECTION INTRAVENOUS at 21:04

## 2021-10-02 RX ADMIN — SODIUM CHLORIDE, PRESERVATIVE FREE 10 ML: 5 INJECTION INTRAVENOUS at 08:44

## 2021-10-02 RX ADMIN — ASPIRIN 81 MG: 81 TABLET, COATED ORAL at 08:43

## 2021-10-02 RX ADMIN — ONDANSETRON 4 MG: 2 INJECTION INTRAMUSCULAR; INTRAVENOUS at 21:04

## 2021-10-02 RX ADMIN — LISINOPRIL 40 MG: 40 TABLET ORAL at 16:42

## 2021-10-02 RX ADMIN — GABAPENTIN 300 MG: 300 CAPSULE ORAL at 21:04

## 2021-10-02 RX ADMIN — AMLODIPINE BESYLATE 2.5 MG: 2.5 TABLET ORAL at 16:42

## 2021-10-02 RX ADMIN — MAGNESIUM HYDROXIDE 30 ML: 2400 SUSPENSION ORAL at 08:43

## 2021-10-02 RX ADMIN — ENOXAPARIN SODIUM 40 MG: 40 INJECTION SUBCUTANEOUS at 08:43

## 2021-10-02 RX ADMIN — HYDROCODONE BITARTRATE AND ACETAMINOPHEN 1 TABLET: 5; 325 TABLET ORAL at 13:40

## 2021-10-02 RX ADMIN — TIZANIDINE 2 MG: 2 TABLET ORAL at 08:43

## 2021-10-02 RX ADMIN — HYDROCODONE BITARTRATE AND ACETAMINOPHEN 1 TABLET: 5; 325 TABLET ORAL at 04:57

## 2021-10-02 RX ADMIN — ONDANSETRON 4 MG: 2 INJECTION INTRAMUSCULAR; INTRAVENOUS at 04:57

## 2021-10-02 RX ADMIN — IMMUNE GLOBULIN (HUMAN) 35 G: 10 INJECTION INTRAVENOUS; SUBCUTANEOUS at 14:02

## 2021-10-02 ASSESSMENT — PAIN DESCRIPTION - ONSET
ONSET: ON-GOING
ONSET: ON-GOING

## 2021-10-02 ASSESSMENT — PAIN DESCRIPTION - ORIENTATION
ORIENTATION: LOWER
ORIENTATION: LOWER

## 2021-10-02 ASSESSMENT — PAIN DESCRIPTION - FREQUENCY
FREQUENCY: CONTINUOUS
FREQUENCY: CONTINUOUS

## 2021-10-02 ASSESSMENT — PAIN SCALES - GENERAL
PAINLEVEL_OUTOF10: 0
PAINLEVEL_OUTOF10: 7
PAINLEVEL_OUTOF10: 8
PAINLEVEL_OUTOF10: 5

## 2021-10-02 ASSESSMENT — PAIN DESCRIPTION - PAIN TYPE
TYPE: CHRONIC PAIN;ACUTE PAIN
TYPE: CHRONIC PAIN

## 2021-10-02 ASSESSMENT — PAIN DESCRIPTION - LOCATION
LOCATION: BACK
LOCATION: BACK;HIP

## 2021-10-02 ASSESSMENT — PAIN DESCRIPTION - DESCRIPTORS
DESCRIPTORS: DISCOMFORT
DESCRIPTORS: ACHING

## 2021-10-02 NOTE — PROGRESS NOTES
[facility].,    Adult Hospitalist      Name: Maximiliano Craig  MRN: [id number]     Acct: [de-identified]  Room: 2041/2041-01    Admit Date: 9/29/2021  8:10 PM  PCP: Juajno Melgar MD    Primary Problem  Active Problems:    Leg paresthesia  Resolved Problems:    * No resolved hospital problems. *        Assesment:     · Guillain [place] syndrome  · Upper and lower extremity weakness  · Paresthesias  · Lumbar arthritis  · Low back pain  · Lumbar herniated disc  · Overweight  · Hypoxia  · Fall  · Low back pain  · Essential Hypertension  · Mixed hyperlipidemia         Plan:     · Admit to Med Surg  · Monitor vitals closely  · Keep SPO2 above 90%  · I's and O's  · IV  · Pain control  · Neurochecks every 4  · Antiemetics as needed  · MRI brain  · MRI cervical spine  · Lumbar puncture  · Consult neurology  · ASA, Lipitor  · CBC, CMP  · UA reflux  · PT OT  · Swallow study  · Check CK, myoglobin, aldolase  · IVIG per neurology  · Gabapentin  · Norco as needed  · Tizanidine as needed  · Resume home meds  · DVT and GI prophylaxis. Chief Complaint:     Chief Complaint   Patient presents with    Extremity Weakness    Hand Numbness         History of Present Illness:        Patient seen and examined at bedside  Patient has had ongoing weakness of both upper and lower extremities   Pt says he takes BP and Chol meds  BP was elevated  We resumed meds  Parents at bedside  Mgmt discussed  Denies cough or wheezing  There has been hypoxia  Denies orthopnea, neck pain  Denies urinary difficulty or bowel problems  Other review of system negative      Maximiliano Craig is a 51 y.o.  male who presents with Extremity Weakness and Hand Numbness    Patient admitted through the [place] ED where he presented with complaints of paresthesias that started off over his back radiating to the back of his thighs.   Patient says he later noticed paresthesias which were patchy over the lower extremities but most predominant over his distal foot and toes. This started 3 days ago and he was woken up with the numbness on his back and bilateral lower extremities. Patient says he took some muscle relaxant and his symptoms improved. Patient says since yesterday his symptoms reappeared and have been more progressive. Patient says he has had similar feelings in his hands. In addition he feels his lower and upper extremities have been weaker. Patient says it has been more difficult to go up and down stairs. He has not been able to elevate his arms above his head and has not been able to comb his hair. Patient complains of weakness in his thighs associated with a dull ache. Patient also complains of a dull mild to moderate and progressive nonradiating occipital headache. Patient says he had a URI almost 2 weeks ago. He was tested for COVID-19 which was negative. Complains of intermittent nausea. Denies abdominal pain or vomiting. Denies fever, chills, joint swelling or rash. Denies chest pain, dyspnea or orthopnea. Denies saddle anesthesia, urinary or bowel dysfunction. Denies any fall or trauma to the back or head. I have personally reviewed the past medical history, past surgical history, medications, social history, and family history, and summarized in the note. Review of Systems:     All 10 point system is reviewed and negative otherwise mentioned in HPI. Past Medical History:     Past Medical History:   Diagnosis Date    Herniation of intervertebral disc between L5 and S1     Low back pain     Sciatica         Past Surgical History:     Past Surgical History:   Procedure Laterality Date    HERNIA REPAIR Right     inguinal        Medications Prior to Admission:       Prior to Admission medications    Medication Sig Start Date End Date Taking?  Authorizing Provider   lisinopril (PRINIVIL;ZESTRIL) 40 MG tablet Take 40 mg by mouth daily   Yes Historical Provider, MD   amLODIPine (NORVASC) 2.5 MG tablet Take 2.5 mg by mouth palpable, no pedal edema or calf pain with palpation  Skin - no gross lesions, rashes, or induration noted        Data:     Labs:    Hematology:  Recent Labs     09/29/21  2128 10/01/21  0434   WBC 6.9 8.8   RBC 4.95 4.57   HGB 13.9 12.8*   HCT 41.2 37.7*   MCV 83.1 82.5*   MCH 28.0 28.0   MCHC 33.7 34.0   RDW 13.2 12.0    188   MPV 8.2 10.1   SEDRATE <1  --    CRP <3.0  --    INR 1.1  --      Chemistry:  Recent Labs     09/29/21 2128 09/30/21  0914 10/01/21  0434 10/02/21  0557     --  137 137   K 4.1  --  4.1 3.7     --  105 104   CO2 26  --  21 21   GLUCOSE 120*  --  100* 97   BUN 17  --  10 8   CREATININE 1.30*  --  1.05 0.99   ANIONGAP 10  --  11 12   LABGLOM 59*  --  >60 >60   GFRAA >60  --  >60 >60   CALCIUM 9.6  --  8.5* 8.5*   TROPHS <6  --   --   --    CKTOTAL  --  158  --   --    MYOGLOBIN  --  66  --   --      Recent Labs     09/29/21  2128 09/30/21  2040 10/01/21  0434   PROT 7.0  --  6.4   LABALBU 4.5  --  3.7   AST 18  --  17   ALT 29  --  25   ALKPHOS 72  --  66   BILITOT 0.40  --  0.54   BILIDIR  --   --  0.13   POCGLU  --  97  --        Lab Results   Component Value Date    INR 1.1 09/29/2021    PROTIME 11.0 09/29/2021       No results found for: SPECIAL  No results found for: CULTURE    No results found for: POCPH, PHART, PH, POCPCO2, SZX1QZP, PCO2, POCPO2, PO2ART, PO2, POCHCO3, YJI1OGA, HCO3, NBEA, PBEA, BEART, BE, THGBART, THB, KQF8WDC, RNLJ4GTV, M9QMFUVC, O2SAT, FIO2    Radiology:    CT Head WO Contrast    Result Date: 9/29/2021  No acute intracranial abnormality. CT CERVICAL SPINE WO CONTRAST    Result Date: 9/29/2021  No acute abnormality of the cervical spine. CT THORACIC SPINE WO CONTRAST    Result Date: 9/29/2021  1. No large disc herniation or protrusion by CT. 2. Other findings as described. CT Lumbar Spine WO Contrast    Result Date: 9/29/2021  1. No acute fracture. No listhesis. 2. Other findings as described.          All radiological studies reviewed                Code Status:  Full Code    Electronically signed by Jesenia Ang MD on 10/2/2021 at 3:25 PM     Copy sent to Dr. Ana Rendon MD    This note was created with the assistance of a speech-recognition program.  Although the intention is to generate a document that actually reflects the content of the visit, no guarantees can be provided that every mistake has been identified and corrected by editing. Note was updated later by me after  physical examination and  completion of the assessment.

## 2021-10-02 NOTE — PROGRESS NOTES
Physical Therapy  Facility/Department: [facility] CVICU  Daily Treatment Note  NAME: Melany Devine  : 1975  MRN: [id number]    Date of Service: 10/2/2021    Discharge Recommendations:  Patient would benefit from continued therapy after discharge      Pt is currently functional below baseline and would suggest intense therapy post acute care. Would expect patient to be able to tolerate 3 hours of therapy per day and able to tolerate at least one hour up in chair. Please refer to AM-PAC score for current mobility/adl level. Assessment   Body structures, Functions, Activity limitations: Decreased functional mobility ; Decreased strength;Decreased endurance;Decreased balance  Assessment: Patient with acute global weakness. Patient progression from [facility] to [facility] with steps to side chair requiring a MOD x2 assist for support and safety. Patient would benefit from continued skilled PT treatment upon discharge. Prognosis: Excellent  Decision Making: Low Complexity  PT Education: PT Role;Plan of Care;General Safety  Patient Education: reviewed HEP handout. REQUIRES PT FOLLOW UP: Yes  Activity Tolerance  Activity Tolerance: Patient limited by endurance     Patient Diagnosis(es): The primary encounter diagnosis was Paresthesia of both lower extremities. Diagnoses of Generalized weakness, Paresthesia of both hands, and Chronic bilateral low back pain with bilateral sciatica were also pertinent to this visit. has a past medical history of Herniation of intervertebral disc between L5 and S1, Low back pain, and Sciatica. has a past surgical history that includes hernia repair (Right). Restrictions  Restrictions/Precautions  Restrictions/Precautions: General Precautions, Fall Risk  Required Braces or Orthoses?: No  Subjective   General  Chart Reviewed: Yes  Response To Previous Treatment: Patient with no complaints from previous session.   Family / Caregiver Present: Yes (Parents)  Subjective  Subjective: Patient agreeable to PT treatment this date  General Comment  Comments: GREG Flores Patient is appropriate for PT treatment this date. Orientation  Orientation  Overall Orientation Status: Within Normal Limits  Cognition      Objective   Bed mobility  Rolling to Left: Supervision  Rolling to Right: Supervision  Supine to Sit: Stand by assistance  Sit to Supine: Unable to assess  Scooting: Stand by assistance  Comment: Patient with good progression to seated EOB posture. Patient requires vc's for self pacing and slowing down for therapuetic outcomes. Transfers  Sit to Stand: Moderate Assistance;2 Person Assistance  Stand to sit: Moderate Assistance;2 Person Assistance  Bed to Chair: Modified independent;2 Person Assistance  Comment: Patient requires MOD x2 assist for support and stability in stance and ambulation with RW. Patient shaky in stance and as patient fatigues shaky increases. Patient with decreased endurance and tolerance to activities. Ambulation  Ambulation?: Yes  More Ambulation?: No  Ambulation 1  Surface: level tile  Device: Rolling Walker  Assistance: Moderate assistance;2 Person assistance  Quality of Gait: slow, shaky  Gait Deviations: Increased JUSTIN;Shuffles; Slow Krystyna  Distance: Patient shaky in ambulation requires MOD x2 assist for support stability RW advancement and positioning as patient with increased support on device with UE. Neuromuscular Education  NDT Treatment: Gait ;Lower extremity; Sitting;Standing  Balance  Posture: Good  Sitting - Static: Good  Sitting - Dynamic: Good  Standing - Static: Fair;+  Standing - Dynamic: Fair  Comments: Patient stands EOB with RW with increased JUSTIN SHARRON LE removes 1 hand for 30 seconds then 2 hands for 20 seconds then repeats with feet together. Promoting balance and stability to progress to ambulation tasks.   Exercises  Quad Sets: 10 x1 SHARRON  Gluteal Sets: 10 x1 SHARRON  Hip Flexion: 10 x1 SHARRON  Hip Abduction: 10 x1 SHARRON  Knee Long Arc Quad: 10 x1 SHARRON  Ankle Pumps: 10 x1 SHARRON  Comments: Patient educated on the importance of AROM throughout the day and its importance. Patient educated on seated and supine AROM with good understanding and carry over. Patient performs standing marches 1 set x10 reps, standing weight shifts 1 set x10 reps. Patient educated on the importance of OOB activities to promote respiratory function and prevent secondary complications. PCT assisted Therapist with all mobility tasks. Mady Zabala)    AM-PAC Score  AM-PAC Inpatient Mobility Raw Score : 14 (10/02/21 1459)  AM-PAC Inpatient T-Scale Score : 38.1 (10/02/21 1459)  Mobility Inpatient CMS 0-100% Score: 61.29 (10/02/21 1459)  Mobility Inpatient CMS G-Code Modifier : CL (10/02/21 1459)          Goals  Short term goals  Time Frame for Short term goals: 12 treatments  Short term goal 1: Independent bed mobility/transfers  Short term goal 2: Independent ambulation 200' x 1  Short term goal 3: Independently ascend/descend 1 flight of steps  Short term goal 4: Good standing balance  Short term goal 5:  Tolerate 30 min ther act/ 5/5 B LE's  Patient Goals   Patient goals : Get strength back    Plan    Plan  Times per week: 1-2x/day,5-6 days/week  Current Treatment Recommendations: Strengthening, Balance Training, Endurance Training, Transfer Training, Gait Training, Functional Mobility Training, Stair training, Neuromuscular Re-education  Safety Devices  Type of devices: Gait belt, Call light within reach, Left in chair  Restraints  Initially in place: No     Therapy Time   Individual Concurrent Group Co-treatment   Time In 1400         Time Out 1425         Minutes [address], [place], [place]

## 2021-10-02 NOTE — PROGRESS NOTES
This is a 52 y/o previously healthy WM who presented with 4 days of progressive numbness and weakness following a viral illness about 2 weeks ago. He is also having significant back pain. 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 Subjective:  He reports felling about the same today. His legs arms still feel quite week but no bulbar symptoms. He did sleep a little better last night. NIFs have been greater than negative 40. He is also reporting some sorethroat and some flu like symptoms but does feel like his legs are moving a bit better. Past Medical History:   Diagnosis Date    Herniation of intervertebral disc between L5 and S1     Low back pain     Sciatica        Past Surgical History:   Procedure Laterality Date    HERNIA REPAIR Right     inguinal       History reviewed. No pertinent family history. Social History     Socioeconomic History    Marital status:      Spouse name: None    Number of children: None    Years of education: None    Highest education level: None   Occupational History    None   Tobacco Use    Smoking status: Former Smoker     Quit date: 2014     Years since quittin.0    Smokeless tobacco: Never Used   Substance and Sexual Activity    Alcohol use: Not Currently    Drug use: Not Currently    Sexual activity: None   Other Topics Concern    None   Social History Narrative    None     Social Determinants of Health     Financial Resource Strain:     Difficulty of Paying Living Expenses:    Food Insecurity:     Worried About Running Out of Food in the Last Year:     Ran Out of Food in the Last Year:    Transportation Needs:     Lack of Transportation (Medical):      Lack of Transportation (Non-Medical):    Physical Activity:     Days of Exercise per Week:     Minutes of Exercise per Session:    Stress:     Feeling of Stress :    Social Connections:     Frequency of Communication with Friends and Family:     Frequency of Social Gatherings with Friends and Family:     Attends Sabianism Services:     Active Member of Clubs or Organizations:     Attends Club or Organization Meetings:     Marital Status:    Intimate Partner Violence:     Fear of Current or Ex-Partner:     Emotionally Abused:     Physically Abused:     Sexually Abused:        Current Facility-Administered Medications   Medication Dose Route Frequency Provider Last Rate Last Admin    tiZANidine (ZANAFLEX) tablet 2 mg  2 mg Oral TID Albaro Spencer MD   2 mg at 10/02/21 0843    gabapentin (NEURONTIN) capsule 300 mg  300 mg Oral Nightly Roberto Waldrop MD   300 mg at 10/01/21 2044    sodium chloride flush 0.9 % injection 10 mL  10 mL IntraVENous 2 times per day Marija Zaman MD   10 mL at 10/02/21 0844    sodium chloride flush 0.9 % injection 10 mL  10 mL IntraVENous PRN Albaro Spencer MD        0.9 % sodium chloride infusion  25 mL IntraVENous PRN Albaro Spencer MD        ondansetron (ZOFRAN-ODT) disintegrating tablet 4 mg  4 mg Oral Q8H PRN Albaro Spencer MD   4 mg at 09/30/21 1514    Or    ondansetron (ZOFRAN) injection 4 mg  4 mg IntraVENous Q6H PRN Albaro Spencer MD   4 mg at 10/02/21 0457    magnesium hydroxide (MILK OF MAGNESIA) 400 MG/5ML suspension 30 mL  30 mL Oral Daily PRN Albaro Spencer MD   30 mL at 10/02/21 0843    enoxaparin (LOVENOX) injection 40 mg  40 mg SubCUTAneous Daily Albaro Spencer MD   40 mg at 10/02/21 0843    aspirin EC tablet 81 mg  81 mg Oral Daily Albaro Spencer MD   81 mg at 10/02/21 7334    Or    aspirin suppository 300 mg  300 mg Rectal Daily Albaro Spencer MD        atorvastatin (LIPITOR) tablet 40 mg  40 mg Oral Nightly Albaro Spencer MD   40 mg at 10/01/21 2010    immune globulin (GAMUNEX-C) 10% solution 35 g  35 g IntraVENous Daily Roberto Waldrop  mL/hr at 10/01/21 1007 35 g at 10/01/21 1007    acetaminophen (TYLENOL) tablet 650 mg  650 mg Oral Q6H PRN Roberto Waldrop MD        diphenhydrAMINE (BENADRYL) tablet

## 2021-10-02 NOTE — FLOWSHEET NOTE
1858: Patient's parents arrived to nurses station and alerted RN of patient's \"face looks like it is drooping. \" Last known well at this time. Patient informed RN that he did notify physical therapy of feeling like the right side of his face was becoming weak and he was taught some exercises. Message was not relayed to RN. NIHSS completed and second RN remains at the patient's bedside. 1910: RN notified Dr. Lacey Vail via perfect serve of patient's right sided facial dropping. NIHSS score of 2. Stroke alert called. Per Dr. Lacey Vail okay to discontinue stroke alert. 1915: CT notified of cancelled stroke alert. 1916: [facility] notified of cancelled stroke alert    Will continue to monitor closely.

## 2021-10-02 NOTE — PLAN OF CARE
Problem: Falls - Risk of:  Goal: Will remain free from falls  Description: Will remain free from falls  Outcome: Ongoing     Problem: Falls - Risk of:  Goal: Absence of physical injury  Description: Absence of physical injury  Outcome: Ongoing     Problem: Infection:  Goal: Will remain free from infection  Description: Will remain free from infection  Outcome: Ongoing     Problem: Safety:  Goal: Free from accidental physical injury  Description: Free from accidental physical injury  Outcome: Ongoing     Problem: Safety:  Goal: Free from intentional harm  Description: Free from intentional harm  Outcome: Ongoing     Problem: Daily Care:  Goal: Daily care needs are met  Description: Daily care needs are met  Outcome: Ongoing     Problem: Pain:  Goal: Patient's pain/discomfort is manageable  Description: Patient's pain/discomfort is manageable  Outcome: Ongoing     Problem: Pain:  Goal: Pain level will decrease  Description: Pain level will decrease  Outcome: Ongoing     Problem: Pain:  Goal: Control of acute pain  Description: Control of acute pain  Outcome: Ongoing     Problem: Pain:  Goal: Control of chronic pain  Description: Control of chronic pain  Outcome: Ongoing

## 2021-10-03 LAB
ABSOLUTE EOS #: 0.04 K/UL (ref 0–0.44)
ABSOLUTE IMMATURE GRANULOCYTE: 0.02 K/UL (ref 0–0.3)
ABSOLUTE LYMPH #: 1.13 K/UL (ref 1.1–3.7)
ABSOLUTE MONO #: 0.73 K/UL (ref 0.1–1.2)
ANION GAP SERPL CALCULATED.3IONS-SCNC: 9 MMOL/L (ref 9–17)
BASOPHILS # BLD: 0 % (ref 0–2)
BASOPHILS ABSOLUTE: <0.03 K/UL (ref 0–0.2)
BUN BLDV-MCNC: 7 MG/DL (ref 6–20)
BUN/CREAT BLD: 7 (ref 9–20)
CALCIUM SERPL-MCNC: 8.6 MG/DL (ref 8.6–10.4)
CHLORIDE BLD-SCNC: 103 MMOL/L (ref 98–107)
CO2: 22 MMOL/L (ref 20–31)
CREAT SERPL-MCNC: 0.98 MG/DL (ref 0.7–1.2)
DIFFERENTIAL TYPE: ABNORMAL
EOSINOPHILS RELATIVE PERCENT: 1 % (ref 1–4)
GFR AFRICAN AMERICAN: >60 ML/MIN
GFR NON-AFRICAN AMERICAN: >60 ML/MIN
GFR SERPL CREATININE-BSD FRML MDRD: ABNORMAL ML/MIN/{1.73_M2}
GFR SERPL CREATININE-BSD FRML MDRD: ABNORMAL ML/MIN/{1.73_M2}
GLUCOSE BLD-MCNC: 107 MG/DL (ref 70–99)
HCT VFR BLD CALC: 37.8 % (ref 40.7–50.3)
HEMOGLOBIN: 12.9 G/DL (ref 13–17)
IMMATURE GRANULOCYTES: 0 %
LYMPHOCYTES # BLD: 15 % (ref 24–43)
MCH RBC QN AUTO: 27.9 PG (ref 25.2–33.5)
MCHC RBC AUTO-ENTMCNC: 34.1 G/DL (ref 28.4–34.8)
MCV RBC AUTO: 81.6 FL (ref 82.6–102.9)
MONOCYTES # BLD: 10 % (ref 3–12)
NRBC AUTOMATED: 0 PER 100 WBC
PDW BLD-RTO: 12.1 % (ref 11.8–14.4)
PLATELET # BLD: 204 K/UL (ref 138–453)
PLATELET ESTIMATE: ABNORMAL
PMV BLD AUTO: 10.2 FL (ref 8.1–13.5)
POTASSIUM SERPL-SCNC: 4.1 MMOL/L (ref 3.7–5.3)
RBC # BLD: 4.63 M/UL (ref 4.21–5.77)
RBC # BLD: ABNORMAL 10*6/UL
SEG NEUTROPHILS: 74 % (ref 36–65)
SEGMENTED NEUTROPHILS ABSOLUTE COUNT: 5.74 K/UL (ref 1.5–8.1)
SODIUM BLD-SCNC: 134 MMOL/L (ref 135–144)
WBC # BLD: 7.7 K/UL (ref 3.5–11.3)
WBC # BLD: ABNORMAL 10*3/UL

## 2021-10-03 PROCEDURE — 6370000000 HC RX 637 (ALT 250 FOR IP): Performed by: PSYCHIATRY & NEUROLOGY

## 2021-10-03 PROCEDURE — 6360000002 HC RX W HCPCS: Performed by: PSYCHIATRY & NEUROLOGY

## 2021-10-03 PROCEDURE — 99233 SBSQ HOSP IP/OBS HIGH 50: CPT | Performed by: PSYCHIATRY & NEUROLOGY

## 2021-10-03 PROCEDURE — 85025 COMPLETE CBC W/AUTO DIFF WBC: CPT

## 2021-10-03 PROCEDURE — 6370000000 HC RX 637 (ALT 250 FOR IP): Performed by: FAMILY MEDICINE

## 2021-10-03 PROCEDURE — 80048 BASIC METABOLIC PNL TOTAL CA: CPT

## 2021-10-03 PROCEDURE — 36415 COLL VENOUS BLD VENIPUNCTURE: CPT

## 2021-10-03 PROCEDURE — 94761 N-INVAS EAR/PLS OXIMETRY MLT: CPT

## 2021-10-03 PROCEDURE — 6360000002 HC RX W HCPCS: Performed by: FAMILY MEDICINE

## 2021-10-03 PROCEDURE — 2700000000 HC OXYGEN THERAPY PER DAY

## 2021-10-03 PROCEDURE — 2060000000 HC ICU INTERMEDIATE R&B

## 2021-10-03 RX ORDER — GABAPENTIN 300 MG/1
300 CAPSULE ORAL ONCE
Status: COMPLETED | OUTPATIENT
Start: 2021-10-03 | End: 2021-10-03

## 2021-10-03 RX ORDER — SENNA PLUS 8.6 MG/1
1 TABLET ORAL 2 TIMES DAILY PRN
Status: DISCONTINUED | OUTPATIENT
Start: 2021-10-03 | End: 2021-10-14 | Stop reason: HOSPADM

## 2021-10-03 RX ORDER — DOCUSATE SODIUM 100 MG/1
100 CAPSULE, LIQUID FILLED ORAL DAILY
Status: DISCONTINUED | OUTPATIENT
Start: 2021-10-03 | End: 2021-10-14 | Stop reason: HOSPADM

## 2021-10-03 RX ORDER — ALPRAZOLAM 0.25 MG/1
0.25 TABLET ORAL 2 TIMES DAILY PRN
Status: DISCONTINUED | OUTPATIENT
Start: 2021-10-03 | End: 2021-10-14 | Stop reason: HOSPADM

## 2021-10-03 RX ORDER — FAMOTIDINE 20 MG/1
20 TABLET, FILM COATED ORAL 2 TIMES DAILY
Status: DISCONTINUED | OUTPATIENT
Start: 2021-10-03 | End: 2021-10-14 | Stop reason: HOSPADM

## 2021-10-03 RX ORDER — GABAPENTIN 300 MG/1
600 CAPSULE ORAL NIGHTLY
Status: DISCONTINUED | OUTPATIENT
Start: 2021-10-03 | End: 2021-10-07

## 2021-10-03 RX ORDER — MAGNESIUM HYDROXIDE/ALUMINUM HYDROXICE/SIMETHICONE 120; 1200; 1200 MG/30ML; MG/30ML; MG/30ML
30 SUSPENSION ORAL 3 TIMES DAILY PRN
Status: DISCONTINUED | OUTPATIENT
Start: 2021-10-03 | End: 2021-10-14 | Stop reason: HOSPADM

## 2021-10-03 RX ORDER — GABAPENTIN 300 MG/1
300 CAPSULE ORAL 2 TIMES DAILY
Status: DISCONTINUED | OUTPATIENT
Start: 2021-10-03 | End: 2021-10-07

## 2021-10-03 RX ADMIN — LISINOPRIL 40 MG: 40 TABLET ORAL at 09:16

## 2021-10-03 RX ADMIN — HYDROCODONE BITARTRATE AND ACETAMINOPHEN 1 TABLET: 5; 325 TABLET ORAL at 00:13

## 2021-10-03 RX ADMIN — ONDANSETRON 4 MG: 2 INJECTION INTRAMUSCULAR; INTRAVENOUS at 04:33

## 2021-10-03 RX ADMIN — MAGNESIUM HYDROXIDE 30 ML: 2400 SUSPENSION ORAL at 12:12

## 2021-10-03 RX ADMIN — GABAPENTIN 600 MG: 300 CAPSULE ORAL at 21:50

## 2021-10-03 RX ADMIN — ALPRAZOLAM 0.25 MG: 0.25 TABLET ORAL at 19:32

## 2021-10-03 RX ADMIN — AMLODIPINE BESYLATE 2.5 MG: 2.5 TABLET ORAL at 09:16

## 2021-10-03 RX ADMIN — ALPRAZOLAM 0.25 MG: 0.25 TABLET ORAL at 09:16

## 2021-10-03 RX ADMIN — FAMOTIDINE 20 MG: 20 TABLET, FILM COATED ORAL at 21:51

## 2021-10-03 RX ADMIN — HYDROCODONE BITARTRATE AND ACETAMINOPHEN 1 TABLET: 5; 325 TABLET ORAL at 23:02

## 2021-10-03 RX ADMIN — GABAPENTIN 300 MG: 300 CAPSULE ORAL at 09:25

## 2021-10-03 RX ADMIN — DOCUSATE SODIUM 100 MG: 100 CAPSULE, LIQUID FILLED ORAL at 19:32

## 2021-10-03 RX ADMIN — SENNOSIDES 8.6 MG: 8.6 TABLET, COATED ORAL at 19:32

## 2021-10-03 RX ADMIN — IMMUNE GLOBULIN (HUMAN) 35 G: 10 INJECTION INTRAVENOUS; SUBCUTANEOUS at 12:04

## 2021-10-03 RX ADMIN — ZOLPIDEM TARTRATE 5 MG: 5 TABLET ORAL at 21:51

## 2021-10-03 RX ADMIN — GABAPENTIN 300 MG: 300 CAPSULE ORAL at 14:07

## 2021-10-03 ASSESSMENT — PAIN DESCRIPTION - PROGRESSION

## 2021-10-03 ASSESSMENT — PAIN DESCRIPTION - ORIENTATION
ORIENTATION: LOWER
ORIENTATION: LOWER

## 2021-10-03 ASSESSMENT — PAIN DESCRIPTION - DIRECTION
RADIATING_TOWARDS: DOWN LEGS
RADIATING_TOWARDS: DOWN LEGS

## 2021-10-03 ASSESSMENT — PAIN DESCRIPTION - ONSET
ONSET: ON-GOING
ONSET: ON-GOING

## 2021-10-03 ASSESSMENT — PAIN - FUNCTIONAL ASSESSMENT
PAIN_FUNCTIONAL_ASSESSMENT: PREVENTS OR INTERFERES SOME ACTIVE ACTIVITIES AND ADLS
PAIN_FUNCTIONAL_ASSESSMENT: PREVENTS OR INTERFERES SOME ACTIVE ACTIVITIES AND ADLS

## 2021-10-03 ASSESSMENT — PAIN SCALES - GENERAL
PAINLEVEL_OUTOF10: 7
PAINLEVEL_OUTOF10: 0
PAINLEVEL_OUTOF10: 0
PAINLEVEL_OUTOF10: 7
PAINLEVEL_OUTOF10: 7
PAINLEVEL_OUTOF10: 3

## 2021-10-03 ASSESSMENT — PAIN DESCRIPTION - LOCATION
LOCATION: BACK;HIP
LOCATION: BACK;HIP

## 2021-10-03 ASSESSMENT — PAIN DESCRIPTION - DESCRIPTORS
DESCRIPTORS: ACHING;SHOOTING;STABBING
DESCRIPTORS: ACHING;STABBING;SHOOTING

## 2021-10-03 ASSESSMENT — PAIN DESCRIPTION - FREQUENCY
FREQUENCY: INTERMITTENT
FREQUENCY: INTERMITTENT

## 2021-10-03 ASSESSMENT — PAIN DESCRIPTION - PAIN TYPE
TYPE: CHRONIC PAIN
TYPE: CHRONIC PAIN

## 2021-10-03 NOTE — PROGRESS NOTES
Patient laying comfortable in bed. HR 83, SpO2 94% on 2L, ETCO2 35mmhg. Bedside NIF: greater than -50mmhg    Will continue to follow.

## 2021-10-03 NOTE — FLOWSHEET NOTE
RN contacted Dr. Sharon Romero via telephone and updated him on the patient's current status and request for xanax. Patient's right sided facial drooping remains present. Right eyebrow pompa snot raise and although eye closure on the right occurs at the same time as the left eye closure, the strength of the closure is decreased on the right side of the face. Patient reports he has not slept well in the last couple of nights. New order received for xanax 0.25 mg p.o. BID PRN for anxiety if okay with neurology.

## 2021-10-03 NOTE — PROGRESS NOTES
This is a 50 y/o previously healthy WM who presented with 4 days of progressive numbness and weakness following a viral illness about 2 weeks ago. He is also having significant back pain. 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 Subjective:  He developed right facial weakness yesterday. This evolved over a few hours. Otherwise he is feeling a bit better with his limb movements but has been sleeping very poorly and admits to being frustrated by the facial weakness. He has been otherwise stable with respect to respiratory status. Past Medical History:   Diagnosis Date    Herniation of intervertebral disc between L5 and S1     Low back pain     Sciatica        Past Surgical History:   Procedure Laterality Date    HERNIA REPAIR Right     inguinal       History reviewed. No pertinent family history. Social History     Socioeconomic History    Marital status:      Spouse name: None    Number of children: None    Years of education: None    Highest education level: None   Occupational History    None   Tobacco Use    Smoking status: Former Smoker     Quit date: 2014     Years since quittin.0    Smokeless tobacco: Never Used   Substance and Sexual Activity    Alcohol use: Not Currently    Drug use: Not Currently    Sexual activity: None   Other Topics Concern    None   Social History Narrative    None     Social Determinants of Health     Financial Resource Strain:     Difficulty of Paying Living Expenses:    Food Insecurity:     Worried About Running Out of Food in the Last Year:     Ran Out of Food in the Last Year:    Transportation Needs:     Lack of Transportation (Medical):      Lack of Transportation (Non-Medical):    Physical Activity:     Days of Exercise per Week:     Minutes of Exercise per Session:    Stress:     Feeling of Stress :    Social Connections:     Frequency of Communication with Friends and Family:     Frequency of Social Gatherings with Friends and Family:     Attends Hindu Services:     Active Member of Clubs or Organizations:     Attends Club or Organization Meetings:     Marital Status:    Intimate Partner Violence:     Fear of Current or Ex-Partner:     Emotionally Abused:     Physically Abused:     Sexually Abused:        Current Facility-Administered Medications   Medication Dose Route Frequency Provider Last Rate Last Admin    ALPRAZolam (XANAX) tablet 0.25 mg  0.25 mg Oral BID PRN Albaro Spencer MD   0.25 mg at 10/03/21 0916    gabapentin (NEURONTIN) capsule 600 mg  600 mg Oral Nightly Mick High MD        gabapentin (NEURONTIN) capsule 300 mg  300 mg Oral BID Mick High MD   300 mg at 10/03/21 1407    lisinopril (PRINIVIL;ZESTRIL) tablet 40 mg  40 mg Oral Daily Joleen Fuchs MD   40 mg at 10/03/21 0916    amLODIPine (NORVASC) tablet 2.5 mg  2.5 mg Oral Daily Joleen Fuchs MD   2.5 mg at 10/03/21 0916    sodium chloride flush 0.9 % injection 10 mL  10 mL IntraVENous 2 times per day Joleen Fuchs MD   10 mL at 10/02/21 2104    sodium chloride flush 0.9 % injection 10 mL  10 mL IntraVENous PRN Albaro Spencer MD        0.9 % sodium chloride infusion  25 mL IntraVENous PRN Albaro Spencer  mL/hr at 10/02/21 1339 25 mL at 10/02/21 1339    ondansetron (ZOFRAN-ODT) disintegrating tablet 4 mg  4 mg Oral Q8H PRN Albaro Spencer MD   4 mg at 09/30/21 1514    Or    ondansetron (ZOFRAN) injection 4 mg  4 mg IntraVENous Q6H PRN Albaro Spencer MD   4 mg at 10/03/21 0433    magnesium hydroxide (MILK OF MAGNESIA) 400 MG/5ML suspension 30 mL  30 mL Oral Daily PRN Albaro Spencer MD   30 mL at 10/03/21 1212    enoxaparin (LOVENOX) injection 40 mg  40 mg SubCUTAneous Daily Albaro Spencer MD   40 mg at 10/02/21 0843    immune globulin (GAMUNEX-C) 10% solution 35 g  35 g IntraVENous Daily Mick High .7 mL/hr at 10/03/21 1204 35 g at 10/03/21 1204    acetaminophen (TYLENOL) tablet 650 mg  650 mg Oral Q6H PRN Hope Payne MD        diphenhydrAMINE (BENADRYL) tablet 25 mg  25 mg Oral Q6H PRN Hope Payne MD        zolpidem [facility]) tablet 5 mg  5 mg Oral Nightly PRN Hope Payne MD   5 mg at 10/01/21 2349    HYDROcodone-acetaminophen (NORCO) 5-325 MG per tablet 1 tablet  1 tablet Oral Q4H PRN Albaro Spencer MD   1 tablet at 10/03/21 0013    0.9 % sodium chloride infusion  1,000 mL IntraVENous Continuous Gilberto Kohli DO   Stopped at 10/02/21 1243       No Known Allergies        Vitals:    10/03/21 1200   BP:    Pulse: 86   Resp: 16   Temp: 97.3 °F (36.3 °C)   SpO2:      Admission weight: 200 lb (90.7 kg)    General Appearance: In some discomfort with moving around     Mental Status Exam:             Level of Alertness:   awake            Orientation:   person, place, time            Memory:   normal            Fund of Knowledge:  normal            Attention/Concentration:  normal            Language:  normal        Cranial Nerves     PERRL, EOMI/F, no ptosis, Severe right upper and lower facial weakness, eye closure is complete but weak. Sensation is intact         Motor:    Deltoids: 4/5  Triceps: 4/5  biceps: 4+/5  HF: 5-/5  KE: 5/5  DF: 4+/5     areflexic      Assessment : This is a 52 y/o with 4 days of progressive and ascending sensory complaints and weakness which occurred 2 weeks after a viral syndrome. His exam is significant for proximal weakness which is worse in the arms, and areflexia. 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 He will receive his 4th dose of IVIG today. He developed a new right facial palsy at the same time his limb strength appears to be improving. Plan:    1. Continue IVIG for total of 2g/kg  2. If he remains stable overnight I think he could transfer out of the ICU tomorrow  3.  Will increase neurontin to 300mg BID, and 600mg qhs  4. LP under fluoro tomorrow to confirm dx

## 2021-10-03 NOTE — PLAN OF CARE
Problem: Falls - Risk of:  Goal: Will remain free from falls  Description: Will remain free from falls  Outcome: Ongoing  Patient has remained free from falls or injury this shift. Bed low and locked. Problem: Falls - Risk of:  Goal: Absence of physical injury  Description: Absence of physical injury  Outcome: Ongoing  Patient has remained safe from harm or injury during shift and this admission. Bed locked and in low position. Call light with in reach. Problem: Safety:  Goal: Ability to chew and swallow food without choking will improve  Description: Ability to chew and swallow food without choking will improve  Outcome: Ongoing  Patient has been able to swallow fluids and pills with out any issue this shift. Head of bed elevated to 90 degrees for all oral intake. No coughing or clearing throat noted. Problem: Safety:  Goal: Maintenance of upright position during and after feeding  Description: Maintenance of upright position during and after feeding  Outcome: Ongoing  Head of bed elevated to 90 degrees for all oral intake. No coughing or clearing throat noted. Problem: Pain:  Description: Pain management should include both nonpharmacologic and pharmacologic interventions. Goal: Patient's pain/discomfort is manageable  Description: Patient's pain/discomfort is manageable  Outcome: Ongoing  Patient did complain of pain in his back and hips and numbness and tingling in his hands/arm and toes. Patient medicated with scheduled Gabapentin and as needed Norco.     Problem: Respiratory:  Goal: Absence of aspiration  Description: Ability to maintain a clear airway will improve  Outcome: Ongoing  Patient has been able to swallow fluids and pills with out any issue this shift. Head of bed elevated to 90 degrees for all oral intake. No coughing or clearing throat noted.

## 2021-10-03 NOTE — PROGRESS NOTES
Mr Coretta Pallas reportedly began to develop some right facial weakness today. For this a stroke alert was called and I discussed this with his nurse and stated that the stroke alert should be cancelled. 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 Given the evidence of continued progression and now with bulbar symptoms he should remain in the ICU and if he becomes notably dysarthric he should be made NPO and undergo reassessment by speech. Armani Kirk. Ian Lyles M.D.   Clinical Neurophysiologist  Neuromuscular Medicine

## 2021-10-03 NOTE — PROGRESS NOTES
[facility]    Adult Hospitalist      Name: Sean Weems  MRN: [id number]     Acct: [de-identified]  Room: 2041/2041-01    Admit Date: 9/29/2021  8:10 PM  PCP: Tierra Pulliam MD    Primary Problem  Active Problems:    Leg paresthesia  Resolved Problems:    * No resolved hospital problems. *        Assesment:     · Guillain [place] syndrome  · Upper and lower extremity weakness  · Paresthesias  · Lumbar arthritis  · Low back pain  · Lumbar herniated disc  · Overweight  · Hypoxia  · Fall  · Essential Hypertension  · Mixed hyperlipidemia   · Constipation    · GERD without esophagitis   · Anxiety disorder  · Insomnia         Plan:     · Admit to Med Surg  · Monitor vitals closely  · Keep SPO2 above 90%  · I's and O's  · IV  · Pain control  · Neurochecks every 4  · Antiemetics as needed  · MRI brain  · MRI cervical spine  · Lumbar puncture  · Consult neurology  · ASA, Lipitor  · CBC, CMP  · UA reflux  · PT OT  · Swallow study  · Check CK, myoglobin, aldolase  · IVIG per neurology  · Gabapentin  · Norco as needed  · Tizanidine as needed  · Resume home meds  · Xanax prn  · MOM  · Colace  · Senna prn  · Antacid prn  · Famotidine  · May shower in am with assistance  · DVT and GI prophylaxis.         Chief Complaint:     Chief Complaint   Patient presents with    Extremity Weakness    Hand Numbness         History of Present Illness:        Patient seen and examined at bedside  Pt had Rt sided facial droop yesterday  Neuro contacted  Patient has had ongoing weakness of both upper and lower extremities  Though now sx stabilizing    Wife and mom at bedside  Have multiple qts  Had CT Abdomen  Question Ch Appendicitis vs hernia scarring   Asking for results  Not in system  Will try to get them also  Mgmt discussed  Denies cough or wheezing  Hypoxia improved  Denies orthopnea, neck pain  Denies urinary difficulty or bowel problems  Other review of system negative      Sean Weems is a 51 y.o.  male who presents with Extremity Weakness and Hand Numbness    Patient admitted through the [facility] ED where he presented with complaints of paresthesias that started off over his back radiating to the back of his thighs. Patient says he later noticed paresthesias which were patchy over the lower extremities but most predominant over his distal foot and toes. This started 3 days ago and he was woken up with the numbness on his back and bilateral lower extremities. Patient says he took some muscle relaxant and his symptoms improved. Patient says since yesterday his symptoms reappeared and have been more progressive. Patient says he has had similar feelings in his hands. In addition he feels his lower and upper extremities have been weaker. Patient says it has been more difficult to go up and down stairs. He has not been able to elevate his arms above his head and has not been able to comb his hair. Patient complains of weakness in his thighs associated with a dull ache. Patient also complains of a dull mild to moderate and progressive nonradiating occipital headache. Patient says he had a URI almost 2 weeks ago. He was tested for COVID-19 which was negative. Complains of intermittent nausea. Denies abdominal pain or vomiting. Denies fever, chills, joint swelling or rash. Denies chest pain, dyspnea or orthopnea. Denies saddle anesthesia, urinary or bowel dysfunction. Denies any fall or trauma to the back or head. I have personally reviewed the past medical history, past surgical history, medications, social history, and family history, and summarized in the note. Review of Systems:     All 10 point system is reviewed and negative otherwise mentioned in HPI.       Past Medical History:     Past Medical History:   Diagnosis Date    Herniation of intervertebral disc between L5 and S1     Low back pain     Sciatica         Past Surgical History:     Past Surgical History:   Procedure Laterality Date    HERNIA REPAIR Right     inguinal        Medications Prior to Admission:       Prior to Admission medications    Medication Sig Start Date End Date Taking? Authorizing Provider   lisinopril (PRINIVIL;ZESTRIL) 40 MG tablet Take 40 mg by mouth daily   Yes Historical Provider, MD   amLODIPine (NORVASC) 2.5 MG tablet Take 2.5 mg by mouth daily   Yes Historical Provider, MD   atorvastatin (LIPITOR) 40 MG tablet Take 40 mg by mouth daily   Yes Historical Provider, MD   Misc Natural Products (JOINT SUPPORT COMPLEX) CAPS Take 1 capsule by mouth daily   Yes Historical Provider, MD   ASHWAGANDHA PO Take 1 tablet by mouth daily   Yes Historical Provider, MD   polyethylene glycol (GLYCOLAX) 17 GM/SCOOP powder Take 17 g by mouth daily   Yes Historical Provider, MD        Allergies:       Patient has no known allergies. Social History:     Tobacco:    reports that he quit smoking about 7 years ago. He has never used smokeless tobacco.  Alcohol:      reports previous alcohol use. Drug Use:  reports previous drug use. Family History:     History reviewed. No pertinent family history.       Physical Exam:     Vitals:  BP (!) 155/93   Pulse 86   Temp 97.3 °F (36.3 °C) (Temporal)   Resp 16   Ht 6' (1.829 m)   Wt 200 lb (90.7 kg)   SpO2 98%   BMI 27.12 kg/m²   Temp (24hrs), Av.8 °F (36.6 °C), Min:97.3 °F (36.3 °C), Max:98.5 °F (36.9 °C)          General appearance - alert, well appearing, and in no acute distress  Mental status - oriented to person, place, and time with normal affect  Head - normocephalic and atraumatic  Eyes - pupils equal and reactive, extraocular eye movements intact, conjunctiva clear  Ears - hearing appears to be intact  Nose - no drainage noted  Mouth - mucous membranes moist  Neck - supple, no carotid bruits, thyroid not palpable  Chest - clear to auscultation, normal effort  Heart - normal rate, regular rhythm, no murmur  Abdomen - soft, nontender, nondistended, bowel sounds present all four quadrants, no masses, hepatomegaly or splenomegaly  Neurological - normal speech, significant weakness of flexor and extensor muscle groups over bilateral upper and bilateral lower extremities. cranial nerves II through XII grossly intact. Reduced sensations bilateral distal feet and all fingers  Extremities - peripheral pulses palpable, no pedal edema or calf pain with palpation  Skin - no gross lesions, rashes, or induration noted        Data:     Labs:    Hematology:  Recent Labs     10/01/21  0434 10/03/21  0457   WBC 8.8 7.7   RBC 4.57 4.63   HGB 12.8* 12.9*   HCT 37.7* 37.8*   MCV 82.5* 81.6*   MCH 28.0 27.9   MCHC 34.0 34.1   RDW 12.0 12.1    204   MPV 10.1 10.2     Chemistry:  Recent Labs     10/01/21  0434 10/02/21  0557 10/03/21  0457    137 134*   K 4.1 3.7 4.1    104 103   CO2 21 21 22   GLUCOSE 100* 97 107*   BUN 10 8 7   CREATININE 1.05 0.99 0.98   ANIONGAP 11 12 9   LABGLOM >60 >60 >60   GFRAA >60 >60 >60   CALCIUM 8.5* 8.5* 8.6     Recent Labs     09/30/21  2040 10/01/21  0434 10/02/21  1900   PROT  --  6.4  --    LABALBU  --  3.7  --    AST  --  17  --    ALT  --  25  --    ALKPHOS  --  66  --    BILITOT  --  0.54  --    BILIDIR  --  0.13  --    POCGLU 97  --  96       Lab Results   Component Value Date    INR 1.1 09/29/2021    PROTIME 11.0 09/29/2021       No results found for: SPECIAL  No results found for: CULTURE    No results found for: POCPH, PHART, PH, POCPCO2, YMT6WPA, PCO2, POCPO2, PO2ART, PO2, POCHCO3, JUX1LSL, HCO3, NBEA, PBEA, BEART, BE, THGBART, THB, FTB0ESX, ICLR6RZI, N9SRKZOK, O2SAT, FIO2    Radiology:    CT Head WO Contrast    Result Date: 9/29/2021  No acute intracranial abnormality. CT CERVICAL SPINE WO CONTRAST    Result Date: 9/29/2021  No acute abnormality of the cervical spine. CT THORACIC SPINE WO CONTRAST    Result Date: 9/29/2021  1. No large disc herniation or protrusion by CT. 2. Other findings as described.      CT Lumbar Spine WO Contrast    Result Date: 9/29/2021  1. No acute fracture. No listhesis. 2. Other findings as described. All radiological studies reviewed                Code Status:  Full Code    Electronically signed by Angela Armstrong MD on 10/3/2021 at 3:55 PM     Copy sent to Dr. Kaitlin Parks MD    This note was created with the assistance of a speech-recognition program.  Although the intention is to generate a document that actually reflects the content of the visit, no guarantees can be provided that every mistake has been identified and corrected by editing. Note was updated later by me after  physical examination and  completion of the assessment.

## 2021-10-03 NOTE — PROGRESS NOTES
Patient resting comfortably in bed. SpO2 100%, HR 56, End tidal CO2 33mmhg . NIF taken at this time over -50 cmH2O. Will continue to monitor closely.

## 2021-10-03 NOTE — FLOWSHEET NOTE
RN confirmed with Dr. Adam Ashford via perfect serve message that xanax 2.5 mg p.o. BID PRN is okay for patient. Order placed. Will continue to monitor closely.

## 2021-10-03 NOTE — FLOWSHEET NOTE
Dr. Adam Ashford arrived to the patient's bedside for evaluation and was updated on the patient's current status via RN. Right sided facial paralysis remains. N&T decreasing in limbs but remains present. New orders received for lumbar puncture under fluoroscopy to be completed tomorrow for diagnosis, as well as neurontin 300 mg p.o. morning and afternoon, and neurontin 600 mg p.o. nightly. Will continue to monitor closely.

## 2021-10-04 ENCOUNTER — APPOINTMENT (OUTPATIENT)
Dept: INTERVENTIONAL RADIOLOGY/VASCULAR | Age: 46
DRG: 096 | End: 2021-10-04
Payer: COMMERCIAL

## 2021-10-04 PROBLEM — G61.0 GUILLAIN BARRÉ SYNDROME (HCC): Status: ACTIVE | Noted: 2021-10-04

## 2021-10-04 LAB
ABSOLUTE EOS #: <0.03 K/UL (ref 0–0.44)
ABSOLUTE IMMATURE GRANULOCYTE: 0.03 K/UL (ref 0–0.3)
ABSOLUTE LYMPH #: 1.32 K/UL (ref 1.1–3.7)
ABSOLUTE MONO #: 1.01 K/UL (ref 0.1–1.2)
ANION GAP SERPL CALCULATED.3IONS-SCNC: 11 MMOL/L (ref 9–17)
APPEARANCE CSF: CLEAR
BASOPHILS # BLD: 1 % (ref 0–2)
BASOPHILS ABSOLUTE: 0.05 K/UL (ref 0–0.2)
BUN BLDV-MCNC: 10 MG/DL (ref 6–20)
BUN/CREAT BLD: 9 (ref 9–20)
CALCIUM SERPL-MCNC: 8.7 MG/DL (ref 8.6–10.4)
CHLORIDE BLD-SCNC: 102 MMOL/L (ref 98–107)
CO2: 22 MMOL/L (ref 20–31)
CREAT SERPL-MCNC: 1.08 MG/DL (ref 0.7–1.2)
DIFFERENTIAL TYPE: ABNORMAL
EOSINOPHILS RELATIVE PERCENT: 0 % (ref 1–4)
GFR AFRICAN AMERICAN: >60 ML/MIN
GFR NON-AFRICAN AMERICAN: >60 ML/MIN
GFR SERPL CREATININE-BSD FRML MDRD: ABNORMAL ML/MIN/{1.73_M2}
GFR SERPL CREATININE-BSD FRML MDRD: ABNORMAL ML/MIN/{1.73_M2}
GLUCOSE BLD-MCNC: 103 MG/DL (ref 70–99)
GLUCOSE, CSF: 60 MG/DL (ref 40–70)
HCT VFR BLD CALC: 41 % (ref 40.7–50.3)
HEMOGLOBIN: 13.8 G/DL (ref 13–17)
IMMATURE GRANULOCYTES: 0 %
INR BLD: 1.1
LYMPHOCYTES # BLD: 17 % (ref 24–43)
MCH RBC QN AUTO: 27.8 PG (ref 25.2–33.5)
MCHC RBC AUTO-ENTMCNC: 33.7 G/DL (ref 28.4–34.8)
MCV RBC AUTO: 82.5 FL (ref 82.6–102.9)
MONOCYTES # BLD: 13 % (ref 3–12)
NRBC AUTOMATED: 0 PER 100 WBC
PDW BLD-RTO: 12.3 % (ref 11.8–14.4)
PLATELET # BLD: 190 K/UL (ref 138–453)
PLATELET ESTIMATE: ABNORMAL
PMV BLD AUTO: 9.7 FL (ref 8.1–13.5)
POTASSIUM SERPL-SCNC: 4.2 MMOL/L (ref 3.7–5.3)
PROTEIN CSF: 273 MG/DL (ref 15–45)
PROTHROMBIN TIME: 13.7 SEC (ref 11.5–14.2)
RBC # BLD: 4.97 M/UL (ref 4.21–5.77)
RBC # BLD: ABNORMAL 10*6/UL
RBC CSF: 8 /MM3
SEG NEUTROPHILS: 69 % (ref 36–65)
SEGMENTED NEUTROPHILS ABSOLUTE COUNT: 5.29 K/UL (ref 1.5–8.1)
SODIUM BLD-SCNC: 135 MMOL/L (ref 135–144)
SUPERNAT COLOR CSF: COLORLESS
TUBE NUMBER CSF: 3
VOLUME CSF: 14
WBC # BLD: 7.7 K/UL (ref 3.5–11.3)
WBC # BLD: ABNORMAL 10*3/UL
WBC CSF: 4 /MM3
XANTHOCHROMIA: ABNORMAL

## 2021-10-04 PROCEDURE — 36415 COLL VENOUS BLD VENIPUNCTURE: CPT

## 2021-10-04 PROCEDURE — 85025 COMPLETE CBC W/AUTO DIFF WBC: CPT

## 2021-10-04 PROCEDURE — 99233 SBSQ HOSP IP/OBS HIGH 50: CPT | Performed by: PSYCHIATRY & NEUROLOGY

## 2021-10-04 PROCEDURE — 6360000002 HC RX W HCPCS: Performed by: PSYCHIATRY & NEUROLOGY

## 2021-10-04 PROCEDURE — 97116 GAIT TRAINING THERAPY: CPT

## 2021-10-04 PROCEDURE — 97535 SELF CARE MNGMENT TRAINING: CPT

## 2021-10-04 PROCEDURE — 6370000000 HC RX 637 (ALT 250 FOR IP): Performed by: PSYCHIATRY & NEUROLOGY

## 2021-10-04 PROCEDURE — 85610 PROTHROMBIN TIME: CPT

## 2021-10-04 PROCEDURE — 94761 N-INVAS EAR/PLS OXIMETRY MLT: CPT

## 2021-10-04 PROCEDURE — 97530 THERAPEUTIC ACTIVITIES: CPT

## 2021-10-04 PROCEDURE — 82945 GLUCOSE OTHER FLUID: CPT

## 2021-10-04 PROCEDURE — 89050 BODY FLUID CELL COUNT: CPT

## 2021-10-04 PROCEDURE — 2709999900 IR LUMBAR PUNCTURE FOR DIAGNOSIS

## 2021-10-04 PROCEDURE — 6370000000 HC RX 637 (ALT 250 FOR IP): Performed by: FAMILY MEDICINE

## 2021-10-04 PROCEDURE — 84157 ASSAY OF PROTEIN OTHER: CPT

## 2021-10-04 PROCEDURE — 2500000003 HC RX 250 WO HCPCS: Performed by: RADIOLOGY

## 2021-10-04 PROCEDURE — 009U3ZX DRAINAGE OF SPINAL CANAL, PERCUTANEOUS APPROACH, DIAGNOSTIC: ICD-10-PCS | Performed by: PSYCHIATRY & NEUROLOGY

## 2021-10-04 PROCEDURE — 2060000000 HC ICU INTERMEDIATE R&B

## 2021-10-04 PROCEDURE — 80048 BASIC METABOLIC PNL TOTAL CA: CPT

## 2021-10-04 PROCEDURE — 6360000002 HC RX W HCPCS: Performed by: FAMILY MEDICINE

## 2021-10-04 PROCEDURE — 97110 THERAPEUTIC EXERCISES: CPT

## 2021-10-04 PROCEDURE — 62328 DX LMBR SPI PNXR W/FLUOR/CT: CPT

## 2021-10-04 PROCEDURE — 2580000003 HC RX 258: Performed by: FAMILY MEDICINE

## 2021-10-04 PROCEDURE — 94799 UNLISTED PULMONARY SVC/PX: CPT

## 2021-10-04 RX ORDER — LIDOCAINE HYDROCHLORIDE 10 MG/ML
5 INJECTION, SOLUTION INFILTRATION; PERINEURAL ONCE
Status: COMPLETED | OUTPATIENT
Start: 2021-10-04 | End: 2021-10-04

## 2021-10-04 RX ORDER — HYDRALAZINE HYDROCHLORIDE 20 MG/ML
10 INJECTION INTRAMUSCULAR; INTRAVENOUS EVERY 6 HOURS PRN
Status: DISCONTINUED | OUTPATIENT
Start: 2021-10-04 | End: 2021-10-14 | Stop reason: HOSPADM

## 2021-10-04 RX ORDER — AMLODIPINE BESYLATE 10 MG/1
10 TABLET ORAL DAILY
Status: DISCONTINUED | OUTPATIENT
Start: 2021-10-05 | End: 2021-10-14 | Stop reason: HOSPADM

## 2021-10-04 RX ORDER — IBUPROFEN 800 MG/1
800 TABLET ORAL
Status: DISCONTINUED | OUTPATIENT
Start: 2021-10-04 | End: 2021-10-05

## 2021-10-04 RX ADMIN — SODIUM CHLORIDE, PRESERVATIVE FREE 10 ML: 5 INJECTION INTRAVENOUS at 09:14

## 2021-10-04 RX ADMIN — GABAPENTIN 300 MG: 300 CAPSULE ORAL at 14:31

## 2021-10-04 RX ADMIN — ALPRAZOLAM 0.25 MG: 0.25 TABLET ORAL at 21:57

## 2021-10-04 RX ADMIN — SODIUM CHLORIDE, PRESERVATIVE FREE 10 ML: 5 INJECTION INTRAVENOUS at 20:49

## 2021-10-04 RX ADMIN — HYDROCODONE BITARTRATE AND ACETAMINOPHEN 1 TABLET: 5; 325 TABLET ORAL at 06:03

## 2021-10-04 RX ADMIN — LIDOCAINE HYDROCHLORIDE 5 ML: 10 INJECTION, SOLUTION EPIDURAL; INFILTRATION; INTRACAUDAL; PERINEURAL at 10:01

## 2021-10-04 RX ADMIN — IMMUNE GLOBULIN (HUMAN) 35 G: 10 INJECTION INTRAVENOUS; SUBCUTANEOUS at 10:41

## 2021-10-04 RX ADMIN — AMLODIPINE BESYLATE 2.5 MG: 2.5 TABLET ORAL at 09:13

## 2021-10-04 RX ADMIN — GABAPENTIN 300 MG: 300 CAPSULE ORAL at 09:14

## 2021-10-04 RX ADMIN — LISINOPRIL 40 MG: 40 TABLET ORAL at 09:14

## 2021-10-04 RX ADMIN — MAGNESIUM HYDROXIDE/ALUMINUM HYDROXICE/SIMETHICONE 30 ML: 120; 1200; 1200 SUSPENSION ORAL at 17:36

## 2021-10-04 RX ADMIN — IBUPROFEN 800 MG: 800 TABLET, FILM COATED ORAL at 17:36

## 2021-10-04 RX ADMIN — FAMOTIDINE 20 MG: 20 TABLET, FILM COATED ORAL at 09:14

## 2021-10-04 RX ADMIN — ZOLPIDEM TARTRATE 5 MG: 5 TABLET ORAL at 21:57

## 2021-10-04 RX ADMIN — GABAPENTIN 600 MG: 300 CAPSULE ORAL at 20:43

## 2021-10-04 RX ADMIN — DOCUSATE SODIUM 100 MG: 100 CAPSULE, LIQUID FILLED ORAL at 09:13

## 2021-10-04 RX ADMIN — ALPRAZOLAM 0.25 MG: 0.25 TABLET ORAL at 06:03

## 2021-10-04 RX ADMIN — FAMOTIDINE 20 MG: 20 TABLET, FILM COATED ORAL at 20:43

## 2021-10-04 ASSESSMENT — PAIN SCALES - GENERAL
PAINLEVEL_OUTOF10: 7
PAINLEVEL_OUTOF10: 4
PAINLEVEL_OUTOF10: 3
PAINLEVEL_OUTOF10: 8
PAINLEVEL_OUTOF10: 5

## 2021-10-04 ASSESSMENT — PAIN DESCRIPTION - PAIN TYPE
TYPE: CHRONIC PAIN
TYPE: CHRONIC PAIN

## 2021-10-04 ASSESSMENT — PAIN DESCRIPTION - LOCATION
LOCATION: BACK;HIP
LOCATION: BACK

## 2021-10-04 ASSESSMENT — PAIN DESCRIPTION - ORIENTATION: ORIENTATION: LOWER

## 2021-10-04 NOTE — PROGRESS NOTES
Physical Therapy  DATE: 10/4/2021    NAME: Tacho Duncan  MRN: [id number]   : 1975    Patient not seen this date for Physical Therapy due to:      [] Cancel by RN or physician due to:   [] Hemodialysis    [] Critical Lab Value Level     [] Blood transfusion in progress    [] Acute or unstable cardiovascular status   _MAP < 55 or more than >115  _HR < 40 or > 130    [] Acute or unstable pulmonary status   -FiO2 > 60%   _RR < 5 or >40    _O2 sats < 85%    [] Strict Bedrest    [x] Off Unit for surgery or procedure: Pt off unit this AM for lumbar puncture. PT will follow and ck back as able or 10/5/21. [] Off Unit for testing       [] Pending imaging to R/O fracture    [] Refusal by Patient      [] Other      [] PT being discontinued at this time. Patient independent. No further needs. [] PT being discontinued at this time as the patient has been transferred to hospice care. No further needs.       Marylu Murphy, PT

## 2021-10-04 NOTE — FLOWSHEET NOTE
Pt tolerated procedure without distress. 14 ml of clear csf fluid removed and collected for lab specimens  Dressing applied to procedure site.  Pt returned to room in nad and instructed to lie flat for @1 hour to prevent spinal headache

## 2021-10-04 NOTE — CARE COORDINATION
Patient had LP today. PT/OT to eval. Last dose of IVIG is today. Plan TBD. Waiting on PT. Continue to follow.

## 2021-10-04 NOTE — PROGRESS NOTES
[facility]    Adult Hospitalist      Name: Tacho Duncan  MRN: [id number]     Acct: [de-identified]  Room: 2041/2041-01    Admit Date: 9/29/2021  8:10 PM  PCP: Ashly Rojo MD    Primary Problem  Active Problems:    Leg paresthesia    Guillain Barré syndrome ([facility] [address]  Resolved Problems:    * No resolved hospital problems. *        Assesment:     · Guillain [place] syndrome  · Upper and lower extremity weakness/Paresthesias  · Lumbar arthritis/Low back pain  · Lumbar herniated disc  · Overweight  · Hypoxia  · Fall  · Essential Hypertension  · Mixed hyperlipidemia   · Constipation    · GERD without esophagitis   · Anxiety disorder  · Insomnia         Plan:     · Admit to Med Surg  · Monitor vitals closely  · Keep SPO2 above 90%  · I's and O's  · IV-blood  · Pain control  · Neurochecks every 4  · Antiemetics as needed  · MRI braincancel  · MRI cervical spinecancel  · Lumbar puncture  · Consult neurology  · ASA, LipitorDC  · CBC, CMP  · UA reflux  · PT OT  · Swallow study  · Check CK, myoglobin, aldolase  · IVIG per neurology  · Gabapentin  · Norco as needed  · Tizanidine as needed  · Resume home meds  · Xanax prn  · MOM  · Colace  · Senna prn  · Antacid prn  · Famotidine  · May shower in am with assistance  · Consult PM&R  · Plan skilled nursing facility for rehabilitation  · CT abdomen per GI at [facility] unremarkable  · DVT and GI prophylaxis.         Chief Complaint:     Chief Complaint   Patient presents with    Extremity Weakness    Hand Numbness         History of Present Illness:        Patient seen and examined at bedside  Pt had Rt sided facial droop which is now improved  Patient has had ongoing weakness of both upper and lower extremities  Though now sx stabilizing    Wife and parents at bedside  Have multiple qts  Denies cough or wheezing  Hypoxia improved  Denies orthopnea, neck pain  Denies urinary difficulty or bowel problems  Other review of system negative      Tacho Duncan is a 51 y.o.  male who presents with Extremity Weakness and Hand Numbness    Patient admitted through the [facility] ED where he presented with complaints of paresthesias that started off over his back radiating to the back of his thighs. Patient says he later noticed paresthesias which were patchy over the lower extremities but most predominant over his distal foot and toes. This started 3 days ago and he was woken up with the numbness on his back and bilateral lower extremities. Patient says he took some muscle relaxant and his symptoms improved. Patient says since yesterday his symptoms reappeared and have been more progressive. Patient says he has had similar feelings in his hands. In addition he feels his lower and upper extremities have been weaker. Patient says it has been more difficult to go up and down stairs. He has not been able to elevate his arms above his head and has not been able to comb his hair. Patient complains of weakness in his thighs associated with a dull ache. Patient also complains of a dull mild to moderate and progressive nonradiating occipital headache. Patient says he had a URI almost 2 weeks ago. He was tested for COVID-19 which was negative. Complains of intermittent nausea. Denies abdominal pain or vomiting. Denies fever, chills, joint swelling or rash. Denies chest pain, dyspnea or orthopnea. Denies saddle anesthesia, urinary or bowel dysfunction. Denies any fall or trauma to the back or head. I have personally reviewed the past medical history, past surgical history, medications, social history, and family history, and summarized in the note. Review of Systems:     All 10 point system is reviewed and negative otherwise mentioned in HPI.       Past Medical History:     Past Medical History:   Diagnosis Date    Herniation of intervertebral disc between L5 and S1     Low back pain     Sciatica         Past Surgical History:     Past Surgical History:   Procedure Laterality Date    HERNIA REPAIR Right     inguinal        Medications Prior to Admission:       Prior to Admission medications    Medication Sig Start Date End Date Taking? Authorizing Provider   lisinopril (PRINIVIL;ZESTRIL) 40 MG tablet Take 40 mg by mouth daily   Yes Historical Provider, MD   amLODIPine (NORVASC) 2.5 MG tablet Take 2.5 mg by mouth daily   Yes Historical Provider, MD   atorvastatin (LIPITOR) 40 MG tablet Take 40 mg by mouth daily   Yes Historical Provider, MD   Misc Natural Products (JOINT SUPPORT COMPLEX) CAPS Take 1 capsule by mouth daily   Yes Historical Provider, MD   ASHWAGANDHA PO Take 1 tablet by mouth daily   Yes Historical Provider, MD   polyethylene glycol (GLYCOLAX) 17 GM/SCOOP powder Take 17 g by mouth daily   Yes Historical Provider, MD        Allergies:       Patient has no known allergies. Social History:     Tobacco:    reports that he quit smoking about 7 years ago. He has never used smokeless tobacco.  Alcohol:      reports previous alcohol use. Drug Use:  reports previous drug use. Family History:     History reviewed. No pertinent family history.       Physical Exam:     Vitals:  BP (!) 158/109   Pulse 53   Temp 97.9 °F (36.6 °C) (Tympanic)   Resp 20   Ht 6' (1.829 m)   Wt 200 lb (90.7 kg)   SpO2 100%   BMI 27.12 kg/m²   Temp (24hrs), Av.1 °F (36.7 °C), Min:97.5 °F (36.4 °C), Max:98.8 °F (37.1 °C)          General appearance - alert, well appearing, and in no acute distress  Mental status - oriented to person, place, and time with normal affect  Head - normocephalic and atraumatic  Eyes - pupils equal and reactive, extraocular eye movements intact, conjunctiva clear  Ears - hearing appears to be intact  Nose - no drainage noted  Mouth - mucous membranes moist  Neck - supple, no carotid bruits, thyroid not palpable  Chest - clear to auscultation, normal effort  Heart - normal rate, regular rhythm, no murmur  Abdomen - soft, nontender, nondistended, bowel sounds present all four quadrants, no masses, hepatomegaly or splenomegaly  Neurological - normal speech, significant weakness of flexor and extensor muscle groups over bilateral upper and bilateral lower extremities. cranial nerves II through XII grossly intact. Reduced sensations bilateral distal feet and all fingers  Extremities - peripheral pulses palpable, no pedal edema or calf pain with palpation  Skin - no gross lesions, rashes, or induration noted        Data:     Labs:    Hematology:  Recent Labs     10/03/21  0457 10/04/21  0505 10/04/21  0940   WBC 7.7 7.7  --    RBC 4.63 4.97  --    HGB 12.9* 13.8  --    HCT 37.8* 41.0  --    MCV 81.6* 82.5*  --    MCH 27.9 27.8  --    MCHC 34.1 33.7  --    RDW 12.1 12.3  --     190  --    MPV 10.2 9.7  --    INR  --   --  1.1     Chemistry:  Recent Labs     10/02/21  0557 10/03/21  0457 10/04/21  0505    134* 135   K 3.7 4.1 4.2    103 102   CO2 21 22 22   GLUCOSE 97 107* 103*   BUN 8 7 10   CREATININE 0.99 0.98 1.08   ANIONGAP 12 9 11   LABGLOM >60 >60 >60   GFRAA >60 >60 >60   CALCIUM 8.5* 8.6 8.7     Recent Labs     10/02/21  1900   POCGLU 96       Lab Results   Component Value Date    INR 1.1 10/04/2021    INR 1.1 09/29/2021    PROTIME 13.7 10/04/2021    PROTIME 11.0 09/29/2021       No results found for: SPECIAL  No results found for: CULTURE    No results found for: POCPH, PHART, PH, POCPCO2, AGY5WZV, PCO2, POCPO2, PO2ART, PO2, POCHCO3, KJO7BHU, HCO3, NBEA, PBEA, BEART, BE, THGBART, THB, JSC3SDM, HUTG2PJF, Y4PXIIWH, O2SAT, FIO2    Radiology:    CT Head WO Contrast    Result Date: 9/29/2021  No acute intracranial abnormality. CT CERVICAL SPINE WO CONTRAST    Result Date: 9/29/2021  No acute abnormality of the cervical spine. CT THORACIC SPINE WO CONTRAST    Result Date: 9/29/2021  1. No large disc herniation or protrusion by CT. 2. Other findings as described. CT Lumbar Spine WO Contrast    Result Date: 9/29/2021  1.  No acute fracture. No listhesis. 2. Other findings as described. All radiological studies reviewed                Code Status:  Full Code    Electronically signed by Kirk Choi MD on 10/4/2021 at 4:16 PM     Copy sent to Dr. Jatinder Hernandez MD    This note was created with the assistance of a speech-recognition program.  Although the intention is to generate a document that actually reflects the content of the visit, no guarantees can be provided that every mistake has been identified and corrected by editing. Note was updated later by me after  physical examination and  completion of the assessment.

## 2021-10-04 NOTE — PROGRESS NOTES
Physical Therapy  Facility/Department: [facility] CVICU  Daily Treatment Note  NAME: Astrid Zepeda  : 1975  MRN: [id number]    Date of Service: 10/4/2021   RN reports patient is medically stable for therapy treatment this date. Chart reviewed prior to treatment and patient is agreeable for therapy. All lines intact and patient positioned comfortably at end of treatment. All patient needs addressed prior to ending therapy session. Discharge Recommendations:  Pt is currently functional below baseline and would suggest intense therapy post acute care. Would expect patient to be able to tolerate 3 hours of therapy per day and able to tolerate at least one hour up in chair. Please refer to AM-PAC score for current mobility/adl level. Patient would benefit from continued therapy after discharge      Assessment   Body structures, Functions, Activity limitations: Decreased functional mobility ; Decreased strength;Decreased endurance;Decreased balance;Decreased safe awareness;Decreased sensation  Assessment: Pt tolerated session fair. Pt continues to present w/ global weakness and demonstrates decreased steadiness w/ increased activity. Pt making progress towards STGs and able to ambulate w/ skilled 2 person assist this date. Pt is a HIGH fall risk d/t balance, strength, mobility, and endurance deficits. Pt would benefit from continued skilled physical therapy to address these deficits and return to PLOF. Prognosis: Excellent  PT Education: Energy Conservation;General Safety;Home Exercise Program  Patient Education: Pt educated on general safety throughout mobility this date, proper gait pattern w/ use of RW, safety throughout ambulation w/ RW, importance of continued mobility throughout admission, benefits of further rehab. Pt verbalized good understanding.   REQUIRES PT FOLLOW UP: Yes  Activity Tolerance  Activity Tolerance: Patient limited by fatigue     Patient Diagnosis(es): The primary encounter requiring increased time and effort to complete tasks this date. While standing EOB, pt performed pre-gait activities in order to assess dynamic stability prior to initiating ambulation. Ambulation  Ambulation?: Yes  More Ambulation?: No  Ambulation 1  Surface: level tile  Device: Rolling Walker  Assistance: Moderate assistance;2 Person assistance  Quality of Gait: slow, shaky, increased effort  Gait Deviations: Slow Krystyna;Staggers;Decreased step length;Decreased step height; Increased JUSTIN  Distance: 10ft x 4  Comments: Pt initially w/ fair steadiness throughout ambulation but demonstrating decreased steadiness w/ increased distance. Pt requiring max verbal and tactile cueing for progression of RW and sequencing of BLE throughout w/ fair return demo. Assist required for safe line mgmt throughout. Stairs/Curb  Stairs?: No     Balance  Posture: Good  Sitting - Static: Good  Sitting - Dynamic: Good;-  Standing - Static: Fair;-  Standing - Dynamic: Poor;+  Comments: Standing balance assessed w/ RW  Exercises  Hip Flexion: Seated marches x 5 BLE  Hip Abduction: Supine x 10 BLE  Knee Long Arc Quad: x 10 BLE  Ankle Pumps: x 10 BLE  Comments: Pt w/ fair demo of proper technique and pacing throughout ther ex. Pt fatiguing quickly, requiring short rest breaks throughout. AM-PAC Score  AM-PAC Inpatient Mobility Raw Score : 14 (10/04/21 1432)  AM-PAC Inpatient T-Scale Score : 38.1 (10/04/21 1432)  Mobility Inpatient CMS 0-100% Score: 61.29 (10/04/21 1432)  Mobility Inpatient CMS G-Code Modifier : CL (10/04/21 1432)        Goals  Short term goals  Time Frame for Short term goals: 12 treatments  Short term goal 1: Independent bed mobility/transfers  Short term goal 2: Independent ambulation 200' x 1  Short term goal 3: Independently ascend/descend 1 flight of steps  Short term goal 4: Good standing balance  Short term goal 5:  Tolerate 30 min ther act/ 5/5 B LE's  Patient Goals   Patient goals : Get strength back    Plan    Plan  Times per week: 1-2x/day,5-6 days/week  Current Treatment Recommendations: Strengthening, Balance Training, Endurance Training, Transfer Training, Gait Training, Functional Mobility Training, Stair training, Neuromuscular Re-education  Safety Devices  Type of devices: Bed alarm in place, Call light within reach, Gait belt, Nurse notified, Left in bed  Restraints  Initially in place: No     Therapy Time   Individual Concurrent Group Co-treatment   Time In 1350         Time Out 1430         Minutes 36            Co-treatment with OT warranted secondary to decreased safety and independence requiring 2 skilled therapy professionals to address individual discipline's goals.      Margarito Abad, PT

## 2021-10-04 NOTE — PROGRESS NOTES
Pt returned from Lumbar puncture. Pt to lay supine for 1 hour. Pt now resting comfortably in bed, in lowest position with call light within reach. RN will continue to monitor closely.

## 2021-10-04 NOTE — PROGRESS NOTES
Occupational Therapy  Facility/Department: [facility]  Daily Treatment Note  NAME: Celestino Jeans  : 1975  MRN: [id number]    Date of Service: 10/4/2021    Discharge Recommendations:  Patient would benefit from continued therapy after discharge       Assessment   Performance deficits / Impairments: Decreased functional mobility ; Decreased ADL status; Decreased strength;Decreased safe awareness;Decreased endurance;Decreased sensation;Decreased balance;Decreased coordination;Decreased fine motor control  Assessment: At this time, pt. is motivated to work towards goals. Pt. is mod assist x2 for functional transfers decreased functional mobility and strength. Skilled OT is warrented to improve overall stength and maximize I/safety to return home with assist as needed. Prognosis: Good  OT Education: OT Role;Plan of Care;Home Exercise Program;Precautions; ADL Adaptive Strategies;Transfer Training;Energy Conservation;Equipment; Family Education  Patient Education: B UE ROM ex issued to focus on UE coordination and facial mobility/movements to increase cooridnation. REQUIRES OT FOLLOW UP: Yes  Safety Devices  Safety Devices in place: Yes  Type of devices: Call light within reach;Nurse notified;Gait belt;Patient at risk for falls; Left in bed;Bed alarm in place         Patient Diagnosis(es): The primary encounter diagnosis was Paresthesia of both lower extremities. Diagnoses of Generalized weakness, Paresthesia of both hands, and Chronic bilateral low back pain with bilateral sciatica were also pertinent to this visit. has a past medical history of Herniation of intervertebral disc between L5 and S1, Low back pain, and Sciatica. has a past surgical history that includes hernia repair (Right).     Restrictions  Restrictions/Precautions  Restrictions/Precautions: General Precautions, Fall Risk  Required Braces or Orthoses?: No  Position Activity Restriction  Other position/activity restrictions: LUE IV, O2 NC, telemetry  Subjective   General  Chart Reviewed: Yes  Patient assessed for rehabilitation services?: Yes  Family / Caregiver Present: Yes (mother and father present)  General Comment  Comments: \"My back is a little sore from the procedure\". Orientation  Orientation  Overall Orientation Status: Within Normal Limits  Objective    ADL  Toileting: Independent  Additional Comments: Pt. independently used urinal while sitting upright on EOB. Balance  Sitting Balance: Stand by assistance (on EOB)  Standing Balance: Moderate assistance (x2)  Standing Balance  Time: 1-2 min standing with use of RW mod assist x2 to insure safety. Functional Mobility  Functional Mobility Comments: Pt. able to side step 1-2 steps with RW to provide better positioning before sitting on EOB  Bed mobility  Supine to Sit: Stand by assistance  Sit to Supine: Stand by assistance  Scooting: Stand by assistance  Transfers  Sit to stand: Moderate assistance;2 Person assistance  Stand to sit: Moderate assistance;2 Person assistance  Transfer Comments: Pt. stood up from EOB with use of RW with Mod assist x2. Pt. followed cues to push up from EOB and was able to hold RW with proper tech. Cognition  Overall Cognitive Status: WFL     Perception  Overall Perceptual Status: WFL      Exercises  Other: See attatched exercise program to promote mobility and coordination. Plan   Plan  Times per week: 4-5x/week, 1-2x/day  Times per day: Daily  Current Treatment Recommendations: Strengthening, Balance Training, Functional Mobility Training, Endurance Training, Safety Education & Training, Self-Care / ADL, Patient/Caregiver Education & Training, Equipment Evaluation, Education, & procurement, Positioning  Plan Comment: Continue with same POC to achieve stated goals to return pt. to highest functioning level.              AM-PAC Score        AM-PAC Inpatient Daily Activity Raw Score: 14 (10/04/21 0246)  AM-PAC Inpatient ADL T-Scale Score : 33.39 (10/04/21 1457)  ADL Inpatient CMS 0-100% Score: 59.67 (10/04/21 1457)  ADL Inpatient CMS G-Code Modifier : CK (10/04/21 1457)    Goals  Short term goals  Time Frame for Short term goals: by discharge, pt will  Short term goal 1: demo CGA with ADL transfers with approp AD/DME and good safety  Short term goal 2: demo and verb good understanding of ed provided on fall prevention techs, equip needs, d/c recommendations, and EC/WS techs  Short term goal 3: demo CGA with toileting routine at approp level with good safety, DME as needed  Short term goal 4: demo SBA with UB ADls and min A LB ADLs with DME as needed and good safety/pacing  Short term goal 5: demo I/S with B UE HEP for inc strength in all muscle groups for inc. functional strength to complete ADLs and functional transfers  Long term goals  Long term goal 1: demo CGA with functional mob in room distances with good safety/pacing for ADL completion with approp AD  Patient Goals   Patient goals : to return home, full function       Therapy Time   Co-treat Concurrent Group Co-treatment   Time In 0150         Time Out 0235         Minutes 39            Co-treatment with PT warranted secondary to decreased safety and independence requiring 2 skilled therapy professionals to address individual discipline's goals. OT addressing \"preparation for ADL transfer\",\"sitting balance for increased ADL performance\",\"sitting/activity tolerance\",\"functional reaching\",\"environmental safety/scanning\",\"fall prevention\",\"functional mobility for ADL transfers\",\"ability to sequence and follow directions\",\"functional UE strength\". GASTON Victor        Access Code: [email or web address]  URL: [email or web address]  Date: 10/04/2021  Prepared by: Yariel Chino    Exercises  Seated Forearm Pronation and Supination AROM - 1 x daily - 7 x weekly - 3 sets - 10 reps  Seated Wrist Flexion Stretch - 1 x daily - 7 x weekly - 3 sets - 10 reps  Seated Wrist Extension Stretch - 1 x daily - 7 x weekly - 3 sets - 10 reps  Seated Elbow Flexion with Resistance - 1 x daily - 7 x weekly - 3 sets - 10 reps  Seated Shoulder Horizontal Abduction with Resistance - 1 x daily - 7 x weekly - 3 sets - 10 reps  Seated Shoulder Diagonal Pulls with Resistance - 1 x daily - 7 x weekly - 3 sets - 10 reps  Isometric Jaw Deviation - 1 x daily - 7 x weekly - 3 sets - 10 reps  Tongue Clicks TMJ - 1 x daily - 7 x weekly - 3 sets - 10 reps  Tongue on Roof of Mouth - 1 x daily - 7 x weekly - 3 sets - 10 reps

## 2021-10-04 NOTE — PROGRESS NOTES
50 yo male with Guillain [place] undergoing IV IgG today being day 5 out of 5 treatments 0.4 gram/kg qd x 5 days . He presents with 4 days of weakness of legs and leg numbness along with arm weakness and right facial weakness that developed in hospital. Head CT normal . CT cervical and thoracic spine normal . CT lumbar sine with left L5-S1 disc protrusion . CSF analysis today 4 WBC , 8 RBC , total protein 273 and glucose 60. He has slow gait with rolling walker with moderate assist walking short distance . He is complaining of low back pain with history of disc herniation . Significant medications Neurontin 300 mg po bid and 600 mg po qhs . Testing  Head CT normal . CT cervical and thoracic spine normal . CT lumbar spine with left L5-S1 disc protrusion . CSF analysis today 4 WBC , 8 RBC , total protein 273 and glucose 60 . IgG 0.4 gram/kg qd x 5 days     Past Medical History:   Diagnosis Date    Herniation of intervertebral disc between L5 and S1     Low back pain     Sciatica        Past Surgical History:   Procedure Laterality Date    HERNIA REPAIR Right     inguinal       History reviewed. No pertinent family history.     Social History     Socioeconomic History    Marital status:      Spouse name: None    Number of children: None    Years of education: None    Highest education level: None   Occupational History    None   Tobacco Use    Smoking status: Former Smoker     Quit date: 2014     Years since quittin.0    Smokeless tobacco: Never Used   Substance and Sexual Activity    Alcohol use: Not Currently    Drug use: Not Currently    Sexual activity: None   Other Topics Concern    None   Social History Narrative    None     Social Determinants of Health     Financial Resource Strain:     Difficulty of Paying Living Expenses:    Food Insecurity:     Worried About Running Out of Food in the Last Year:     Susana of Food in the Last Year:    Transportation Needs:     Lack of Transportation (Medical):      Lack of Transportation (Non-Medical):    Physical Activity:     Days of Exercise per Week:     Minutes of Exercise per Session:    Stress:     Feeling of Stress :    Social Connections:     Frequency of Communication with Friends and Family:     Frequency of Social Gatherings with Friends and Family:     Attends Hoahaoism Services:     Active Member of Clubs or Organizations:     Attends Club or Organization Meetings:     Marital Status:    Intimate Partner Violence:     Fear of Current or Ex-Partner:     Emotionally Abused:     Physically Abused:     Sexually Abused:        Current Facility-Administered Medications   Medication Dose Route Frequency Provider Last Rate Last Admin    ALPRAZolam (XANAX) tablet 0.25 mg  0.25 mg Oral BID PRN Akin Bain MD   0.25 mg at 10/04/21 0603    gabapentin (NEURONTIN) capsule 600 mg  600 mg Oral Nightly Jyoti Aguayo MD   600 mg at 10/03/21 2150    gabapentin (NEURONTIN) capsule 300 mg  300 mg Oral BID Jyoti Aguayo MD   300 mg at 10/04/21 0914    senna (SENOKOT) tablet 8.6 mg  1 tablet Oral BID PRN Albaro Spencer MD   8.6 mg at 10/03/21 1932    docusate sodium (COLACE) capsule 100 mg  100 mg Oral Daily Albaro Spencer MD   100 mg at 10/04/21 0913    aluminum & magnesium hydroxide-simethicone (MAALOX) 200-200-20 MG/5ML suspension 30 mL  30 mL Oral TID PRN Albaro Spencer MD        famotidine (PEPCID) tablet 20 mg  20 mg Oral BID Albaro Spencer MD   20 mg at 10/04/21 0914    lisinopril (PRINIVIL;ZESTRIL) tablet 40 mg  40 mg Oral Daily Albaro Spencer MD   40 mg at 10/04/21 0914    amLODIPine (NORVASC) tablet 2.5 mg  2.5 mg Oral Daily Akin Bani MD   2.5 mg at 10/04/21 0913    sodium chloride flush 0.9 % injection 10 mL  10 mL IntraVENous 2 times per day Akin Bain MD   10 mL at 10/04/21 0914    sodium chloride flush 0.9 % injection 10 mL  10 mL IntraVENous PRN Akin Bain MD        0.9 % sodium chloride

## 2021-10-04 NOTE — CARE COORDINATION
Social Work_Case manager met with patient. He is requesting rehab at [facility].  Sent referral. Jesenia Jaimes

## 2021-10-04 NOTE — PROGRESS NOTES
Occupational Therapy  DATE: 10/4/2021    NAME: Lupillo Guardado  MRN: [id number]   : 1975    Patient not seen this date for Occupational Therapy due to:      [] Cancel by RN or physician due to:    [] Hemodialysis    [] Critical Lab Value Level     [] Blood transfusion in progress    [] Acute or unstable cardiovascular status   _MAP < 55 or more than >115  _HR < 40 or > 130    [] Acute or unstable pulmonary status   -FiO2 > 60%   _RR < 5 or >40    _O2 sats < 85%    [] Strict Bedrest    [] Off Unit for surgery or procedure    [] Off Unit for testing       [] Pending imaging to R/O fracture    [] Refusal by Patient      [] Other      [] OT being discontinued at this time. Patient independent. No further needs. [] OT being discontinued at this time as the patient has been transferred to hospice care. No further needs. Pt. Unavailable for Lumbar procedure with precautions of laying supine after procedure.    Rahel Lynn GASTON

## 2021-10-05 ENCOUNTER — APPOINTMENT (OUTPATIENT)
Dept: INTERVENTIONAL RADIOLOGY/VASCULAR | Age: 46
DRG: 096 | End: 2021-10-05
Payer: COMMERCIAL

## 2021-10-05 ENCOUNTER — APPOINTMENT (OUTPATIENT)
Dept: CT IMAGING | Age: 46
DRG: 096 | End: 2021-10-05
Payer: COMMERCIAL

## 2021-10-05 LAB
ABSOLUTE EOS #: 0.04 K/UL (ref 0–0.44)
ABSOLUTE IMMATURE GRANULOCYTE: 0.01 K/UL (ref 0–0.3)
ABSOLUTE LYMPH #: 1.52 K/UL (ref 1.1–3.7)
ABSOLUTE MONO #: 0.91 K/UL (ref 0.1–1.2)
ANION GAP SERPL CALCULATED.3IONS-SCNC: 12 MMOL/L (ref 9–17)
BASOPHILS # BLD: 1 % (ref 0–2)
BASOPHILS ABSOLUTE: 0.05 K/UL (ref 0–0.2)
BUN BLDV-MCNC: 13 MG/DL (ref 6–20)
BUN/CREAT BLD: 13 (ref 9–20)
CALCIUM SERPL-MCNC: 8.7 MG/DL (ref 8.6–10.4)
CHLORIDE BLD-SCNC: 103 MMOL/L (ref 98–107)
CO2: 21 MMOL/L (ref 20–31)
CREAT SERPL-MCNC: 1 MG/DL (ref 0.7–1.2)
DIFFERENTIAL TYPE: ABNORMAL
EOSINOPHILS RELATIVE PERCENT: 1 % (ref 1–4)
GFR AFRICAN AMERICAN: >60 ML/MIN
GFR NON-AFRICAN AMERICAN: >60 ML/MIN
GFR SERPL CREATININE-BSD FRML MDRD: ABNORMAL ML/MIN/{1.73_M2}
GFR SERPL CREATININE-BSD FRML MDRD: ABNORMAL ML/MIN/{1.73_M2}
GLUCOSE BLD-MCNC: 101 MG/DL (ref 70–99)
HCT VFR BLD CALC: 43.1 % (ref 40.7–50.3)
HEMOGLOBIN: 14.7 G/DL (ref 13–17)
IMMATURE GRANULOCYTES: 0 %
INR BLD: 1.1
LACTATE DEHYDROGENASE: 148 U/L (ref 135–225)
LYMPHOCYTES # BLD: 22 % (ref 24–43)
MAGNESIUM: 2 MG/DL (ref 1.6–2.6)
MCH RBC QN AUTO: 27.5 PG (ref 25.2–33.5)
MCHC RBC AUTO-ENTMCNC: 34.1 G/DL (ref 28.4–34.8)
MCV RBC AUTO: 80.7 FL (ref 82.6–102.9)
MONOCYTES # BLD: 13 % (ref 3–12)
NRBC AUTOMATED: 0 PER 100 WBC
PARTIAL THROMBOPLASTIN TIME: 26.3 SEC (ref 23.9–33.8)
PDW BLD-RTO: 12.6 % (ref 11.8–14.4)
PHOSPHORUS: 3.6 MG/DL (ref 2.5–4.5)
PLATELET # BLD: 218 K/UL (ref 138–453)
PLATELET ESTIMATE: ABNORMAL
PMV BLD AUTO: 9.8 FL (ref 8.1–13.5)
POTASSIUM SERPL-SCNC: 3.9 MMOL/L (ref 3.7–5.3)
PROTHROMBIN TIME: 14.3 SEC (ref 11.5–14.2)
RBC # BLD: 5.34 M/UL (ref 4.21–5.77)
RBC # BLD: ABNORMAL 10*6/UL
SEG NEUTROPHILS: 63 % (ref 36–65)
SEGMENTED NEUTROPHILS ABSOLUTE COUNT: 4.39 K/UL (ref 1.5–8.1)
SODIUM BLD-SCNC: 136 MMOL/L (ref 135–144)
WBC # BLD: 6.9 K/UL (ref 3.5–11.3)
WBC # BLD: ABNORMAL 10*3/UL

## 2021-10-05 PROCEDURE — 83735 ASSAY OF MAGNESIUM: CPT

## 2021-10-05 PROCEDURE — 83615 LACTATE (LD) (LDH) ENZYME: CPT

## 2021-10-05 PROCEDURE — 97116 GAIT TRAINING THERAPY: CPT

## 2021-10-05 PROCEDURE — 6370000000 HC RX 637 (ALT 250 FOR IP): Performed by: PSYCHIATRY & NEUROLOGY

## 2021-10-05 PROCEDURE — 02H633Z INSERTION OF INFUSION DEVICE INTO RIGHT ATRIUM, PERCUTANEOUS APPROACH: ICD-10-PCS | Performed by: PSYCHIATRY & NEUROLOGY

## 2021-10-05 PROCEDURE — 97112 NEUROMUSCULAR REEDUCATION: CPT

## 2021-10-05 PROCEDURE — 80048 BASIC METABOLIC PNL TOTAL CA: CPT

## 2021-10-05 PROCEDURE — 94799 UNLISTED PULMONARY SVC/PX: CPT

## 2021-10-05 PROCEDURE — 99233 SBSQ HOSP IP/OBS HIGH 50: CPT | Performed by: PSYCHIATRY & NEUROLOGY

## 2021-10-05 PROCEDURE — 2709999900 IR NONTUNNELED VASCULAR CATHETER > 5 YEARS

## 2021-10-05 PROCEDURE — 2580000003 HC RX 258: Performed by: FAMILY MEDICINE

## 2021-10-05 PROCEDURE — 85610 PROTHROMBIN TIME: CPT

## 2021-10-05 PROCEDURE — 6360000002 HC RX W HCPCS: Performed by: FAMILY MEDICINE

## 2021-10-05 PROCEDURE — 97110 THERAPEUTIC EXERCISES: CPT

## 2021-10-05 PROCEDURE — 2060000000 HC ICU INTERMEDIATE R&B

## 2021-10-05 PROCEDURE — 74176 CT ABD & PELVIS W/O CONTRAST: CPT

## 2021-10-05 PROCEDURE — 97530 THERAPEUTIC ACTIVITIES: CPT

## 2021-10-05 PROCEDURE — 70450 CT HEAD/BRAIN W/O DYE: CPT

## 2021-10-05 PROCEDURE — 84100 ASSAY OF PHOSPHORUS: CPT

## 2021-10-05 PROCEDURE — 77001 FLUOROGUIDE FOR VEIN DEVICE: CPT | Performed by: RADIOLOGY

## 2021-10-05 PROCEDURE — 36415 COLL VENOUS BLD VENIPUNCTURE: CPT

## 2021-10-05 PROCEDURE — 76937 US GUIDE VASCULAR ACCESS: CPT | Performed by: RADIOLOGY

## 2021-10-05 PROCEDURE — 6360000002 HC RX W HCPCS: Performed by: RADIOLOGY

## 2021-10-05 PROCEDURE — 85025 COMPLETE CBC W/AUTO DIFF WBC: CPT

## 2021-10-05 PROCEDURE — 36556 INSERT NON-TUNNEL CV CATH: CPT | Performed by: RADIOLOGY

## 2021-10-05 PROCEDURE — 85730 THROMBOPLASTIN TIME PARTIAL: CPT

## 2021-10-05 PROCEDURE — 6370000000 HC RX 637 (ALT 250 FOR IP): Performed by: FAMILY MEDICINE

## 2021-10-05 RX ORDER — ACETAMINOPHEN 325 MG/1
650 TABLET ORAL EVERY 4 HOURS PRN
Status: DISCONTINUED | OUTPATIENT
Start: 2021-10-05 | End: 2021-10-14 | Stop reason: HOSPADM

## 2021-10-05 RX ORDER — HEPARIN SODIUM 1000 [USP'U]/ML
INJECTION, SOLUTION INTRAVENOUS; SUBCUTANEOUS
Status: COMPLETED | OUTPATIENT
Start: 2021-10-05 | End: 2021-10-05

## 2021-10-05 RX ORDER — IBUPROFEN 800 MG/1
800 TABLET ORAL 3 TIMES DAILY
Status: DISCONTINUED | OUTPATIENT
Start: 2021-10-05 | End: 2021-10-06

## 2021-10-05 RX ADMIN — GABAPENTIN 600 MG: 300 CAPSULE ORAL at 20:44

## 2021-10-05 RX ADMIN — HYDROCODONE BITARTRATE AND ACETAMINOPHEN 1 TABLET: 5; 325 TABLET ORAL at 02:12

## 2021-10-05 RX ADMIN — SODIUM CHLORIDE, PRESERVATIVE FREE 10 ML: 5 INJECTION INTRAVENOUS at 08:20

## 2021-10-05 RX ADMIN — SODIUM CHLORIDE, PRESERVATIVE FREE 10 ML: 5 INJECTION INTRAVENOUS at 20:49

## 2021-10-05 RX ADMIN — FAMOTIDINE 20 MG: 20 TABLET, FILM COATED ORAL at 08:20

## 2021-10-05 RX ADMIN — HYDRALAZINE HYDROCHLORIDE 10 MG: 20 INJECTION INTRAMUSCULAR; INTRAVENOUS at 02:19

## 2021-10-05 RX ADMIN — DOCUSATE SODIUM 100 MG: 100 CAPSULE, LIQUID FILLED ORAL at 08:20

## 2021-10-05 RX ADMIN — HEPARIN SODIUM 1100 UNITS: 1000 INJECTION, SOLUTION INTRAVENOUS; SUBCUTANEOUS at 17:19

## 2021-10-05 RX ADMIN — GABAPENTIN 300 MG: 300 CAPSULE ORAL at 14:37

## 2021-10-05 RX ADMIN — FAMOTIDINE 20 MG: 20 TABLET, FILM COATED ORAL at 20:44

## 2021-10-05 RX ADMIN — ALPRAZOLAM 0.25 MG: 0.25 TABLET ORAL at 05:28

## 2021-10-05 RX ADMIN — MAGNESIUM HYDROXIDE 30 ML: 2400 SUSPENSION ORAL at 14:37

## 2021-10-05 RX ADMIN — HEPARIN SODIUM 1400 UNITS: 1000 INJECTION, SOLUTION INTRAVENOUS; SUBCUTANEOUS at 17:19

## 2021-10-05 RX ADMIN — IBUPROFEN 800 MG: 800 TABLET, FILM COATED ORAL at 08:20

## 2021-10-05 RX ADMIN — IBUPROFEN 800 MG: 800 TABLET, FILM COATED ORAL at 20:44

## 2021-10-05 RX ADMIN — AMLODIPINE BESYLATE 10 MG: 10 TABLET ORAL at 08:19

## 2021-10-05 RX ADMIN — GABAPENTIN 300 MG: 300 CAPSULE ORAL at 08:19

## 2021-10-05 RX ADMIN — ENOXAPARIN SODIUM 40 MG: 40 INJECTION SUBCUTANEOUS at 08:19

## 2021-10-05 RX ADMIN — LISINOPRIL 40 MG: 40 TABLET ORAL at 08:20

## 2021-10-05 RX ADMIN — SENNOSIDES 8.6 MG: 8.6 TABLET, COATED ORAL at 22:06

## 2021-10-05 ASSESSMENT — PAIN DESCRIPTION - LOCATION
LOCATION: BACK

## 2021-10-05 ASSESSMENT — PAIN DESCRIPTION - PAIN TYPE
TYPE: CHRONIC PAIN
TYPE: CHRONIC PAIN

## 2021-10-05 ASSESSMENT — PAIN SCALES - GENERAL
PAINLEVEL_OUTOF10: 6
PAINLEVEL_OUTOF10: 3
PAINLEVEL_OUTOF10: 6
PAINLEVEL_OUTOF10: 3
PAINLEVEL_OUTOF10: 8
PAINLEVEL_OUTOF10: 5

## 2021-10-05 ASSESSMENT — PAIN DESCRIPTION - DIRECTION: RADIATING_TOWARDS: DOWN LEGS

## 2021-10-05 ASSESSMENT — PAIN DESCRIPTION - ORIENTATION
ORIENTATION: LOWER
ORIENTATION: LOWER

## 2021-10-05 ASSESSMENT — PAIN DESCRIPTION - PROGRESSION: CLINICAL_PROGRESSION: NOT CHANGED

## 2021-10-05 ASSESSMENT — PAIN DESCRIPTION - ONSET: ONSET: ON-GOING

## 2021-10-05 ASSESSMENT — PAIN DESCRIPTION - DESCRIPTORS: DESCRIPTORS: ACHING;SHOOTING;STABBING

## 2021-10-05 ASSESSMENT — PAIN - FUNCTIONAL ASSESSMENT: PAIN_FUNCTIONAL_ASSESSMENT: PREVENTS OR INTERFERES SOME ACTIVE ACTIVITIES AND ADLS

## 2021-10-05 ASSESSMENT — PAIN DESCRIPTION - FREQUENCY: FREQUENCY: INTERMITTENT

## 2021-10-05 NOTE — PROGRESS NOTES
Quit date: 2014     Years since quittin.0    Smokeless tobacco: Never Used   Substance and Sexual Activity    Alcohol use: Not Currently    Drug use: Not Currently    Sexual activity: None   Other Topics Concern    None   Social History Narrative    None     Social Determinants of Health     Financial Resource Strain:     Difficulty of Paying Living Expenses:    Food Insecurity:     Worried About Running Out of Food in the Last Year:     Ran Out of Food in the Last Year:    Transportation Needs:     Lack of Transportation (Medical):      Lack of Transportation (Non-Medical):    Physical Activity:     Days of Exercise per Week:     Minutes of Exercise per Session:    Stress:     Feeling of Stress :    Social Connections:     Frequency of Communication with Friends and Family:     Frequency of Social Gatherings with Friends and Family:     Attends Sikhism Services:     Active Member of Clubs or Organizations:     Attends Club or Organization Meetings:     Marital Status:    Intimate Partner Violence:     Fear of Current or Ex-Partner:     Emotionally Abused:     Physically Abused:     Sexually Abused:        Current Facility-Administered Medications   Medication Dose Route Frequency Provider Last Rate Last Admin    ibuprofen (ADVIL;MOTRIN) tablet 800 mg  800 mg Oral TID  Nelson Leger MD   800 mg at 10/05/21 0820    amLODIPine (NORVASC) tablet 10 mg  10 mg Oral Daily Albaro Spencer MD   10 mg at 10/05/21 0819    hydrALAZINE (APRESOLINE) injection 10 mg  10 mg IntraVENous Q6H PRN Albaro Spencer MD   10 mg at 10/05/21 0219    ALPRAZolam (XANAX) tablet 0.25 mg  0.25 mg Oral BID PRN Albaro Spencer MD   0.25 mg at 10/05/21 0528    gabapentin (NEURONTIN) capsule 600 mg  600 mg Oral Nightly Wiley De La Cruz MD   600 mg at 10/04/21 2043    gabapentin (NEURONTIN) capsule 300 mg  300 mg Oral BID Wiley De La Cruz MD   300 mg at 10/05/21 0819    senna (SENOKOT) tablet 8.6 mg  1 tablet Oral BID PRN Albaro Spencer MD   8.6 mg at 10/03/21 1932    docusate sodium (COLACE) capsule 100 mg  100 mg Oral Daily Albaro Spencer MD   100 mg at 10/05/21 0820    aluminum & magnesium hydroxide-simethicone (MAALOX) 200-200-20 MG/5ML suspension 30 mL  30 mL Oral TID PRN Albaro Spencer MD   30 mL at 10/04/21 1736    famotidine (PEPCID) tablet 20 mg  20 mg Oral BID Pallavi Oseguera MD   20 mg at 10/05/21 0820    lisinopril (PRINIVIL;ZESTRIL) tablet 40 mg  40 mg Oral Daily Albaro pSencer MD   40 mg at 10/05/21 0820    sodium chloride flush 0.9 % injection 10 mL  10 mL IntraVENous 2 times per day Pallavi Oseguera MD   10 mL at 10/05/21 0820    sodium chloride flush 0.9 % injection 10 mL  10 mL IntraVENous PRN Albaro Spencer MD        0.9 % sodium chloride infusion  25 mL IntraVENous PRN Albaro Spencer  mL/hr at 10/02/21 1339 25 mL at 10/02/21 1339    ondansetron (ZOFRAN-ODT) disintegrating tablet 4 mg  4 mg Oral Q8H PRN Albaro Spencer MD   4 mg at 09/30/21 1514    Or    ondansetron (ZOFRAN) injection 4 mg  4 mg IntraVENous Q6H PRN Albaro Spencer MD   4 mg at 10/03/21 0433    magnesium hydroxide (MILK OF MAGNESIA) 400 MG/5ML suspension 30 mL  30 mL Oral Daily PRN Albaro Spencer MD   30 mL at 10/03/21 1212    enoxaparin (LOVENOX) injection 40 mg  40 mg SubCUTAneous Daily Albaro Spencer MD   40 mg at 10/05/21 0819    acetaminophen (TYLENOL) tablet 650 mg  650 mg Oral Q6H PRN Harrison Ramirez MD        diphenhydrAMINE (BENADRYL) tablet 25 mg  25 mg Oral Q6H PRN Harrison Ramirez MD        zolpidem THC [place], [facility] -  [facility]) tablet 5 mg  5 mg Oral Nightly PRN Harrison Ramirez MD   5 mg at 10/04/21 7703    HYDROcodone-acetaminophen (NORCO) 5-325 MG per tablet 1 tablet  1 tablet Oral Q4H PRN Pallavi Oseguera MD   1 tablet at 10/05/21 0212       No Known Allergies    ROS:   Constitutional                  Negative for fever and chills   HEENT                            Negative for ear discharge, ear pain, nosebleed  Eyes                                Negative for photophobia, pain and discharge  Respiratory                      Negative for hemoptysis and sputum  Cardiovascular                Negative for orthopnea, claudication and PND  Gastrointestinal               Negative for abdominal pain, diarrhea, blood in stool  Musculoskeletal               Positive low back pain   Skin                                 Negative for rash or itching  Endo/heme/allergies       Negative for polydipsia, environmental allergy  Psychiatric                       Negative for suicidal ideation. Patient is not anxious    Vitals:    10/05/21 1143   BP:    Pulse:    Resp:    Temp: 98.2 °F (36.8 °C)   SpO2:      Admission weight: 200 lb (90.7 kg)    Neurological Examination  Constitutional .General exam well groomed   Head/ Ears /Nose/Throat/external ear . Normal exam  Neck and thyroid . Normal size. No bruits  Respiratory . Breathsounds clear bilaterally  Cardiovascular: Auscultation of heart with regular rate and rhythm   Musculoskeletal. Muscle bulk and tone normal                                                           Muscle strength deltoids 4/5 , biceps 4+/5 . liopsoas , hamstrings and ADF 5-/5 otherwise 5/5 strength throughout                                                                                No dysmetria or dysdiadokinesis  No tremor   Normal fine motor  Orientation Alert and oriented x 3   Attention and concentration normal  Short term memory normal  Language process and speech normal . No aphasia   Cranial nerve 2 normal acuety and visual fields  Cranial nerve 3, 4 and 6 . Extraocular muscles are intact . Pupils are equal and reactive   Cranial nerve 5 . Normal strength of masseter and temporalis . Intact corneal reflex. Normal facial sensation  Cranial nerve 7 minor left lower facial contraction none on on right  Partial ability to close both eyes . Mild left lower facial contraction .  None on right lower facial all other ROS negative except as per HPI

## 2021-10-05 NOTE — PROGRESS NOTES
[facility]        10/5/2021   3:50 PM    Name:  Maryam Mccormick  MRN:    [id number]     Acct:     [de-identified]   Room:  2041/2041-01  IP Day: 5     Admit Date: 9/29/2021  8:10 PM  PCP: Kimberly Pedraza MD    C/C:   Chief Complaint   Patient presents with    Extremity Weakness    Hand Numbness       Assessment:      · Guillain [place] syndrome  · Upper and lower extremity weakness/Paresthesias  · Worsening left-sided facial droop  · Lumbar arthritis/Low back pain  · Lumbar herniated disc  · Acute hypoxic respitratory failure  · Fall  · Essential Hypertension  · Mixed hyperlipidemia   · Constipation    · GERD without esophagitis   · Anxiety disorder  · Insomnia       Plan:        · Patient is currently in ICU  · Altered mental status General 92%  · Neurochecks per protocol  · S/p IVIG x5  · Plan for plasmapheresis  · Speech evaluation  · CT brain  · CT abdomen and pelvis without contrast  · Neurology following  · DVT and GI prophylaxis  · Continue to monitor/telemetry/CBC with differential daily/BMP daily  · Continue medications as below    Scheduled Meds:   ibuprofen  800 mg Oral TID WC    amLODIPine  10 mg Oral Daily    gabapentin  600 mg Oral Nightly    gabapentin  300 mg Oral BID    docusate sodium  100 mg Oral Daily    famotidine  20 mg Oral BID    lisinopril  40 mg Oral Daily    sodium chloride flush  10 mL IntraVENous 2 times per day    enoxaparin  40 mg SubCUTAneous Daily     Continuous Infusions:   sodium chloride 25 mL (10/02/21 1339)     PRN Meds:  hydrALAZINE, 10 mg, Q6H PRN  ALPRAZolam, 0.25 mg, BID PRN  senna, 1 tablet, BID PRN  aluminum & magnesium hydroxide-simethicone, 30 mL, TID PRN  sodium chloride flush, 10 mL, PRN  sodium chloride, 25 mL, PRN  ondansetron, 4 mg, Q8H PRN   Or  ondansetron, 4 mg, Q6H PRN  magnesium hydroxide, 30 mL, Daily PRN  acetaminophen, 650 mg, Q6H PRN  diphenhydrAMINE, 25 mg, Q6H PRN  zolpidem, 5 mg, Nightly PRN  HYDROcodone 5 mg - acetaminophen, 1 tablet, Q4H PRN            Subjective:     Patient seen and examined at bedside. No acute complaints today. Patient has a swallow study/speech evaluation. Patient has persistent facial droop and weakness. Also complaining of lower quadrant abdominal pain. Afebrile      ROS:  A 10 point system reviewed and negative otherwise mentioned above. Physical Examination:      Vitals:  BP (!) 153/102   Pulse 80   Temp 98.2 °F (36.8 °C)   Resp 16   Ht 6' (1.829 m)   Wt 200 lb (90.7 kg)   SpO2 98%   BMI 27.12 kg/m²   Temp (24hrs), Av.3 °F (36.8 °C), Min:97.7 °F (36.5 °C), Max:98.6 °F (37 °C)    Weight:   Wt Readings from Last 3 Encounters:   10/02/21 200 lb (90.7 kg)     I/O last 3 completed shifts:  I/O last 3 completed shifts: In: 80 [P.O.:800; I.V.:10]  Out: 2771 [Urine:1650]     Recent Labs     10/02/21  1900   POCGLU 96         General appearance - alert, well appearing, and in no acute distress  Mental status - oriented to person, place, and time with normal affect  Head - normocephalic and atraumatic  Eyes - pupils equal and reactive, extraocular eye movements intact, conjunctiva clear  Ears - hearing appears to be intact  Nose - no drainage noted  Mouth - mucous membranes moist  Neck - supple, no carotid bruits, thyroid not palpable  Chest - clear to auscultation, normal effort  Heart - normal rate, regular rhythm, no murmur  Abdomen - soft, nontender, nondistended, bowel sounds present all four quadrants, no masses, hepatomegaly or splenomegaly  Neurological - normal speech, no focal findings or movement disorder noted, cranial nerves II through XII grossly intact  Extremities - peripheral pulses palpable, no pedal edema or calf pain with palpation  Skin - no gross lesions, rashes, or induration noted        Medications:      Allergies: No Known Allergies    Current Meds:   Current Facility-Administered Medications:     ibuprofen (ADVIL;MOTRIN) tablet 800 mg, 800 mg, Oral, TID WC, Nelson Leger MD, 800 mg at 10/05/21 0820    amLODIPine (NORVASC) tablet 10 mg, 10 mg, Oral, Daily, Albaro Spencer MD, 10 mg at 10/05/21 0819    hydrALAZINE (APRESOLINE) injection 10 mg, 10 mg, IntraVENous, Q6H PRN, Albaro Spencer MD, 10 mg at 10/05/21 0219    ALPRAZolam (XANAX) tablet 0.25 mg, 0.25 mg, Oral, BID PRN, Janet Ramos MD, 0.25 mg at 10/05/21 8741    gabapentin (NEURONTIN) capsule 600 mg, 600 mg, Oral, Nightly, Wiley De La Cruz MD, 600 mg at 10/04/21 2043    gabapentin (NEURONTIN) capsule 300 mg, 300 mg, Oral, BID, Wiley De La Cruz MD, 300 mg at 10/05/21 1437    senna (SENOKOT) tablet 8.6 mg, 1 tablet, Oral, BID PRN, Janet Ramos MD, 8.6 mg at 10/03/21 1932    docusate sodium (COLACE) capsule 100 mg, 100 mg, Oral, Daily, Janet Ramos MD, 100 mg at 10/05/21 0820    aluminum & magnesium hydroxide-simethicone (MAALOX) 200-200-20 MG/5ML suspension 30 mL, 30 mL, Oral, TID PRN, Albaro Spencer MD, 30 mL at 10/04/21 1736    famotidine (PEPCID) tablet 20 mg, 20 mg, Oral, BID, Albaro Spencer MD, 20 mg at 10/05/21 0820    lisinopril (PRINIVIL;ZESTRIL) tablet 40 mg, 40 mg, Oral, Daily, Janet Ramos MD, 40 mg at 10/05/21 0820    sodium chloride flush 0.9 % injection 10 mL, 10 mL, IntraVENous, 2 times per day, Janet Ramos MD, 10 mL at 10/05/21 0820    sodium chloride flush 0.9 % injection 10 mL, 10 mL, IntraVENous, PRN, [place], MD    0.9 % sodium chloride infusion, 25 mL, IntraVENous, PRN, Janet Ramos MD, Last Rate: 100 mL/hr at 10/02/21 1339, 25 mL at 10/02/21 1339    ondansetron (ZOFRAN-ODT) disintegrating tablet 4 mg, 4 mg, Oral, Q8H PRN, 4 mg at 09/30/21 1514 **OR** ondansetron (ZOFRAN) injection 4 mg, 4 mg, IntraVENous, Q6H PRN, Albaro Spencer MD, 4 mg at 10/03/21 0433    magnesium hydroxide (MILK OF MAGNESIA) 400 MG/5ML suspension 30 mL, 30 mL, Oral, Daily PRN, Albaro Spencer MD, 30 mL at 10/05/21 1437    enoxaparin (LOVENOX) injection 40 mg, 40 mg, SubCUTAneous, Daily, Albaro Spencer MD, 40 mg at 10/05/21 0819    acetaminophen (TYLENOL) tablet 650 mg, 650 mg, Oral, Q6H PRN, Corrina Scales MD    diphenhydrAMINE (BENADRYL) tablet 25 mg, 25 mg, Oral, Q6H PRN, Corrina Scales MD    zolpidem [facility] [place], [facility]) tablet 5 mg, 5 mg, Oral, Nightly PRN, Corrina Scales MD, 5 mg at 10/04/21 2157    HYDROcodone-acetaminophen (NORCO) 5-325 MG per tablet 1 tablet, 1 tablet, Oral, Q4H PRN, Concha Howell MD, 1 tablet at 10/05/21 3024      I/O (24Hr): Intake/Output Summary (Last 24 hours) at 10/5/2021 1550  Last data filed at 10/5/2021 0819  Gross per 24 hour   Intake 810 ml   Output 1650 ml   Net -840 ml       Data:           Labs:    Hematology:  Recent Labs     10/03/21  0457 10/04/21  0505 10/04/21  0940 10/05/21  0500   WBC 7.7 7.7  --  6.9   RBC 4.63 4.97  --  5.34   HGB 12.9* 13.8  --  14.7   HCT 37.8* 41.0  --  43.1   MCV 81.6* 82.5*  --  80.7*   MCH 27.9 27.8  --  27.5   MCHC 34.1 33.7  --  34.1   RDW 12.1 12.3  --  12.6    190  --  218   MPV 10.2 9.7  --  9.8   INR  --   --  1.1  --      Chemistry:  Recent Labs     10/03/21  0457 10/04/21  0505 10/05/21  0500   * 135 136   K 4.1 4.2 3.9    102 103   CO2 22 22 21   GLUCOSE 107* 103* 101*   BUN 7 10 13   CREATININE 0.98 1.08 1.00   ANIONGAP 9 11 12   LABGLOM >60 >60 >60   GFRAA >60 >60 >60   CALCIUM 8.6 8.7 8.7     Recent Labs     10/02/21  1900   POCGLU 96       No results found for: SPECIAL  No results found for: CULTURE    No results found for: POCPH, PHART, PH, POCPCO2, RVC7HSN, PCO2, POCPO2, PO2ART, PO2, POCHCO3, NLT4QHS, HCO3, NBEA, PBEA, BEART, BE, THGBART, THB, GUB9DWK, REOI0XWD, D9CJMLCH, O2SAT, FIO2    Radiology:    CT Head WO Contrast    Result Date: 9/29/2021  No acute intracranial abnormality. CT CERVICAL SPINE WO CONTRAST    Result Date: 9/29/2021  No acute abnormality of the cervical spine. CT THORACIC SPINE WO CONTRAST    Result Date: 9/29/2021  1.  No large disc herniation or protrusion by CT. 2. Other findings as described. CT Lumbar Spine WO Contrast    Result Date: 9/29/2021  1. No acute fracture. No listhesis. 2. Other findings as described. IR LUMBAR PUNCTURE FOR DIAGNOSIS    Result Date: 10/4/2021  Successful fluoroscopic-guided lumbar puncture. All radiological studies reviewed  Code Status:  Full Code        Electronically signed by Erma Davis MD on 10/5/2021 at 3:50 PM    This note was created with the assistance of a speech-recognition program.  Although the intention is to generate a document that actually reflects the content of the visit, no guarantees can be provided that every mistake has been identified and corrected by editing. Note was updated later by me after  physical examination and  completion of the assessment.

## 2021-10-05 NOTE — PROGRESS NOTES
RN to assist pt to restroom with Carina Gatica, 1 assist needed. Pt did well with pulling self up and standing on own. Still weak but noted improvement from yesterday.

## 2021-10-05 NOTE — PROGRESS NOTES
RN spoke to Dr. Dulce Sanchez per Dr. Jasper Canela request for confirmation on order of CT of brain w/o contrast.  Dr. Dulce Sanchez is agreeable and order will be placed.

## 2021-10-05 NOTE — FLOWSHEET NOTE
10/05/21 1050   Provider Notification   Reason for Communication New orders   Provider Name Dr. Nir Rosenberg   Provider Notification Physician   Method of Communication Page   Response Waiting for response   Notification Time 1050     RN paged Dr. Nir Rosenberg regarding getting an order for speech eval and treatment. RN waiting response at this time.

## 2021-10-05 NOTE — PROGRESS NOTES
Lisinopril order discontinued at this time until Plasmapheresis treatments (10 days) are all completed. Will continue after complete completion of treatments. Do not give Lisinopril or any other ACE inhibitor during the course of Plasmapheresis treatments. Per Abraham Glass RN.

## 2021-10-05 NOTE — PROGRESS NOTES
Pt states he had a rough night, in a lot of pain. Pain medications given this a.m., pt repositioned. Pt started taking pills with applesauce as they are easier to swallow this way. Still no bowel movement, pt states that if he doesn't go after breakfast he will request PRN medication to help. Pt becoming frustrated with the situation & not being able to do normal activities plus having the added pain. Pt states \"how long will this last\". RN at bedside for extensive period of time speaking with patient. RN to continue to monitor patient closely.

## 2021-10-05 NOTE — PROGRESS NOTES
Dr. Jazmine Hill on unit, states he will contact . 's and get the proper number for the [place] who will be running the Plasmapheresis.

## 2021-10-05 NOTE — PLAN OF CARE
Problem: Falls - Risk of:  Goal: Will remain free from falls  Description: Will remain free from falls  Outcome: Ongoing     Problem: Falls - Risk of:  Goal: Absence of physical injury  Description: Absence of physical injury  Outcome: Ongoing     Problem: Safety:  Goal: Free from accidental physical injury  Description: Free from accidental physical injury  Outcome: Ongoing     Problem: Safety:  Goal: Free from intentional harm  Description: Free from intentional harm  Outcome: Ongoing     Problem: Safety:  Goal: Ability to chew and swallow food without choking will improve  Description: Ability to chew and swallow food without choking will improve  Outcome: Ongoing     Problem: Safety:  Goal: Ability to demonstrate good, daily oral hygiene techniques will improve  Description: Ability to demonstrate good, daily oral hygiene techniques will improve  Outcome: Ongoing     Problem: Daily Care:  Goal: Daily care needs are met  Description: Daily care needs are met  Outcome: Ongoing     Problem: Pain:  Goal: Patient's pain/discomfort is manageable  Description: Patient's pain/discomfort is manageable  Outcome: Ongoing     Problem: Pain:  Goal: Pain level will decrease  Description: Pain level will decrease  Outcome: Ongoing     Problem: Pain:  Goal: Control of acute pain  Description: Control of acute pain  Outcome: Ongoing     Problem: Pain:  Goal: Control of chronic pain  Description: Control of chronic pain  Outcome: Ongoing     Problem: Skin Integrity:  Goal: Skin integrity will stabilize  Description: Skin integrity will stabilize  Outcome: Ongoing

## 2021-10-05 NOTE — BRIEF OP NOTE
Brief Postoperative Note Non Tunneled HD Catheter    John Cervantes  YOB: 1975  [id number]    Pre-operative Diagnosis: Devoria Hail      Post-operative Diagnosis: Same    Procedure: Nontunneled Plasmapharesis Catheter    Anesthesia: 2%Lidocaine Local Injection     Surgeons/Assistants: Kamron Cates MD    Estimated Blood Loss: Minimal    Complications: none    14 Fr x 20 non tunneled HD Catheter placed Site:  Right Internal Jugular Vein. Dressing applied. May use HD cath for plasmapharesis.     Electronically signed by Kamron Cates MD on 10/5/2021 at 5:30 PM

## 2021-10-05 NOTE — PROGRESS NOTES
Occupational Therapy  Facility/Department: [facility]  Daily Treatment Note  NAME: Alphonse Calabrese  : 1975  MRN: [id number]    Date of Service: 10/5/2021    Discharge Recommendations:  Patient would benefit from continued therapy after discharge       Assessment   Performance deficits / Impairments: Decreased functional mobility ; Decreased ADL status; Decreased strength;Decreased safe awareness;Decreased endurance;Decreased sensation;Decreased balance;Decreased coordination;Decreased fine motor control  Assessment: At this time, pt remains motivated to work towards goals. Pt. required Min assist x2 on initial stance to insure safety and proper use of RW. Pt. completed ADL transfers with min assist x1. Nursing notified to allow toilet transfers with use of  RW with min assist x1. Pt. is limited with fatigue and weakness for overall function of ADLS. Skilled OT is warranted to focus on increasing mobility and functional endurance to return to prior living arrangements and with assist as needed. Prognosis: Good  OT Education: OT Role;Plan of Care;Home Exercise Program;Precautions; ADL Adaptive Strategies;Transfer Training;Equipment; Energy Conservation  Patient Education: Pt. educated on home safety with ADLS and possible equipment that could benefit for independence, graded theraband exercises to promote strength in B UE, and importance of pacing self with energy conservation skills. Pt. attentive and receptive to all information. REQUIRES OT FOLLOW UP: Yes  Activity Tolerance  Activity Tolerance: Patient Tolerated treatment well;Patient limited by fatigue  Activity Tolerance: limited by weakness, fatigue  Safety Devices  Safety Devices in place: Yes  Type of devices: All fall risk precautions in place;Gait belt;Left in chair;Nurse notified;Call light within reach         Patient Diagnosis(es): The primary encounter diagnosis was Paresthesia of both lower extremities.  Diagnoses of Generalized weakness, Paresthesia of both hands, and Chronic bilateral low back pain with bilateral sciatica were also pertinent to this visit. has a past medical history of Herniation of intervertebral disc between L5 and S1, Low back pain, and Sciatica. has a past surgical history that includes hernia repair (Right). Restrictions  Restrictions/Precautions  Restrictions/Precautions: General Precautions, Fall Risk  Required Braces or Orthoses?: No  Position Activity Restriction  Other position/activity restrictions: LUE IV, O2 NC, telemetry  Subjective   General  Chart Reviewed: Yes  Patient assessed for rehabilitation services?: Yes  Response to previous treatment: Patient with no complaints from previous session  Family / Caregiver Present: No  General Comment  Comments: \"I feel strong like I'm ready to go but my body won't go\"      Orientation  Orientation  Overall Orientation Status: Within Normal Limits  Objective        Balance  Sitting Balance: Supervision  Standing Balance: Minimal assistance  Standing Balance  Time: standing tolerance 3-4 min. Functional Mobility  Functional Mobility Comments: Pt. displayed increased motivation today while transfering however required cues to allow body to rest from fatigue and pace self to insure safety. Bed mobility  Rolling to Left: Independent  Rolling to Right: Independent  Supine to Sit: Independent  Sit to Supine: Independent  Scooting: Independent  Comment: Pt. independent with bed mobility supine to sit<>sit to supine. Transfers  Sit to stand: Minimal assistance;2 Person assistance  Stand to sit: Minimal assistance;2 Person assistance  Transfer Comments: Upon initial transfer, pt. stood up from EOB with min assist 2x to provide safety and proper use of walker. Pt. displayed increased skill today. Once established on feet, pt. completed ADL transfer x3 with min assist x1. Nursing notified to allow pt. to transfer with use of RW to toilet with use of gait belt min assist x1. Cognition  Overall Cognitive Status: WFL     Perception  Overall Perceptual Status: WFL          Type of ROM/Therapeutic Exercise  Comment: Pt. issued blue theraband to add exercise program issued yesterday. Pt. attentive and receptive to information on use of resistive band exercise tech. Plan   Plan  Times per week: 4-5x/week, 1-2x/day  Times per day: Daily  Current Treatment Recommendations: Strengthening, Balance Training, Functional Mobility Training, Endurance Training, Safety Education & Training, Self-Care / ADL, Patient/Caregiver Education & Training, Equipment Evaluation, Education, & procurement, Positioning  Plan Comment: Cont with same POC achieve stated goals.                        AM-PAC Score        AM-PAC Inpatient Daily Activity Raw Score: 16 (10/05/21 1250)  AM-PAC Inpatient ADL T-Scale Score : 35.96 (10/05/21 1250)  ADL Inpatient CMS 0-100% Score: 53.32 (10/05/21 1250)  ADL Inpatient CMS G-Code Modifier : CK (10/05/21 1250)    Goals  Short term goals  Time Frame for Short term goals: by discharge, pt will  Short term goal 1: demo CGA with ADL transfers with approp AD/DME and good safety  Short term goal 2: demo and verb good understanding of ed provided on fall prevention techs, equip needs, d/c recommendations, and EC/WS techs  Short term goal 3: demo CGA with toileting routine at approp level with good safety, DME as needed  Short term goal 4: demo SBA with UB ADls and min A LB ADLs with DME as needed and good safety/pacing  Short term goal 5: demo I/S with B UE HEP for inc strength in all muscle groups for inc. functional strength to complete ADLs and functional transfers  Long term goals  Long term goal 1: demo CGA with functional mob in room distances with good safety/pacing for ADL completion with approp AD  Patient Goals   Patient goals : to return home, full function       Therapy Time   Individual Concurrent Group Co-treatment   Time In 0100     1030   Time Out 0115 80   Minutes 17     28        Co-treatment with PT warranted secondary to decreased safety and independence requiring 2 skilled therapy professionals to address individual discipline's goals. OT addressing :\"preparation for ADL transfer\",\"sitting balance for increased ADL performance\",\"sitting/activity tolerance\",\"functional reaching\",\"environmental safety/scanning\",\"fall prevention\",\"functional mobility for ADL transfers\",\"ability to sequence and follow directions\",\"functional UE strength\".     Malinda Araujo GASTON

## 2021-10-05 NOTE — PROGRESS NOTES
Physical Therapy  Facility/Department: [facility] CV[facility]  Daily Treatment Note  NAME: Chele Simmons  : 1975  MRN: [id number]    Date of Service: 10/5/2021    Discharge Recommendations:  Patient would benefit from continued therapy after discharge     Pt is currently functional below baseline and would suggest intense therapy post acute care. Would expect patient to be able to tolerate 3 hours of therapy per day and able to tolerate at least one hour up in chair. Please refer to AM-PAC score for current mobility/adl level. Assessment   Body structures, Functions, Activity limitations: Decreased functional mobility ; Decreased strength;Decreased endurance;Decreased balance;Decreased safe awareness;Decreased sensation  Assessment: Pt tolerated session fair. Pt continues to present w/ global weakness and demonstrates decreased steadiness w/ increased activity. Pt making progress towards STGs and able to ambulate w/ skilled 2 person assist this date. Pt is a HIGH fall risk d/t balance, strength, mobility, and endurance deficits. Pt would benefit from continued skilled physical therapy to address these deficits and return to PLOF. Prognosis: Excellent  PT Education: Energy Conservation;General Safety;Home Exercise Program  Patient Education: Pt educated on general safety throughout mobility this date, proper gait pattern w/ use of RW, safety throughout ambulation w/ RW, importance of continued mobility throughout admission, benefits of further rehab. Pt verbalized good understanding. REQUIRES PT FOLLOW UP: Yes  Activity Tolerance  Activity Tolerance: Patient limited by fatigue     Patient Diagnosis(es): The primary encounter diagnosis was Paresthesia of both lower extremities. Diagnoses of Generalized weakness, Paresthesia of both hands, and Chronic bilateral low back pain with bilateral sciatica were also pertinent to this visit.      has a past medical history of Herniation of intervertebral disc between L5 and S1, Low back pain, and Sciatica. has a past surgical history that includes hernia repair (Right). Restrictions  Restrictions/Precautions  Restrictions/Precautions: General Precautions, Fall Risk  Required Braces or Orthoses?: No  Position Activity Restriction  Other position/activity restrictions: LUE IV, O2 NC, telemetry  Subjective   General  Chart Reviewed: Yes  Response To Previous Treatment: Patient with no complaints from previous session. Family / Caregiver Present: No  Subjective  Subjective: Pt reporting fatigue this date, but motivated to work w/ therapy. General Comment  Comments: RN and pt agreeable to therapy. Orientation  Orientation  Overall Orientation Status: Within Normal Limits  Cognition   Cognition  Overall Cognitive Status: WFL  Objective   Bed mobility  Comment: Patient at EOB upon writer's arrival, returning from restroom, PCT present and using [employer]dy. Transfers  Sit to Stand: Minimal Assistance;2 Person Assistance  Stand to sit: Minimal Assistance;2 Person Assistance  Bed to Chair: Minimal assistance;2 Person Assistance  Comment: STS with good understanding of safety with using UB correctly. Retired to chair at end of treatment. Increased education regarding self pacing and understanding new limitation. Ambulation  Ambulation?: Yes  More Ambulation?: Yes  Ambulation 1  Surface: level tile  Device: Rolling Walker  Assistance: Minimal assistance  Quality of Gait: slow, shaky, increased effort  Gait Deviations: Slow Krystyna;Staggers;Decreased step length;Decreased step height; Increased JUSTIN  Distance: 20' x 4  Comments: Pt initially w/ fair steadiness throughout ambulation but demonstrating decreased steadiness w/ increased distance. Pt requiring max verbal and tactile cueing for progression of RW and sequencing of BLE throughout w/ fair return demo. Ambulation 2  Surface - 2: level tile (PM ambulation)  Device 2: [address] [facility] 2: Minimal assistance; Moderate mobility/transfers  Short term goal 2: Independent ambulation 200' x 1  Short term goal 3: Independently ascend/descend 1 flight of steps  Short term goal 4: Good standing balance  Short term goal 5: Tolerate 30 min ther act/ 5/5 B LE's  Patient Goals   Patient goals : Get strength back    Plan    Plan  Times per week: 1-2x/day,5-6 days/week  Current Treatment Recommendations: Strengthening, Balance Training, Endurance Training, Transfer Training, Gait Training, Functional Mobility Training, Stair training, Neuromuscular Re-education  Safety Devices  Type of devices: Bed alarm in place, Call light within reach, Gait belt, Nurse notified, Left in bed  Restraints  Initially in place: No     Therapy Time   Individual Concurrent Group Co-treatment   Time In [phone]   Time Out 2936     1112   Minutes 25     31        Co-treatment with OT warranted secondary to decreased safety and independence requiring 2 skilled therapy professionals to address individual discipline's goals. PT addressing pre gait trunk strengthening, weight shifting prior to transfers, transfer training and postural control in sitting/standing iniattly but progressed to only required 1 assist during treatment and will no longer required a co-tx.     Sebastian Aquino, PTA

## 2021-10-05 NOTE — PROGRESS NOTES
RN contacted Elkin Patel from the [facility] regarding need for Plasmapheresis. Elkin Patel will have machine brought over tonight and they will start running treatment tomorrow. RN placed appropriate orders per Elkin Patel, see orders. Hold ACE inhibitors (lisinopril) throughout entire treatment, even on off days. Please place appropriate lab orders to be drawn with morning labs for each day of treatment. Including -  CBC with Diff  BMP  Ionized Calcium  Fibrinogen level    Catheter placed is heparin locked per IR staff. Before 1st treatment [employer] [facility] RN will need H & P  Xray of proof of placement for tunneled cath  Medication list    [facility] # is [phone] or [phone], please call with any questions. Elkin Patel to fax paperwork with order set to CVICU nurses station.

## 2021-10-06 LAB
ABSOLUTE EOS #: 0.04 K/UL (ref 0–0.44)
ABSOLUTE IMMATURE GRANULOCYTE: 0.02 K/UL (ref 0–0.3)
ABSOLUTE LYMPH #: 1.48 K/UL (ref 1.1–3.7)
ABSOLUTE MONO #: 0.93 K/UL (ref 0.1–1.2)
ANION GAP SERPL CALCULATED.3IONS-SCNC: 12 MMOL/L (ref 9–17)
BASOPHILS # BLD: 1 % (ref 0–2)
BASOPHILS ABSOLUTE: 0.04 K/UL (ref 0–0.2)
BUN BLDV-MCNC: 14 MG/DL (ref 6–20)
BUN/CREAT BLD: 13 (ref 9–20)
CALCIUM IONIZED: 1.27 MMOL/L (ref 1.13–1.33)
CALCIUM SERPL-MCNC: 8.9 MG/DL (ref 8.6–10.4)
CHLORIDE BLD-SCNC: 101 MMOL/L (ref 98–107)
CO2: 22 MMOL/L (ref 20–31)
CREAT SERPL-MCNC: 1.12 MG/DL (ref 0.7–1.2)
DIFFERENTIAL TYPE: ABNORMAL
EOSINOPHILS RELATIVE PERCENT: 1 % (ref 1–4)
FIBRINOGEN: 417 MG/DL (ref 179–518)
GFR AFRICAN AMERICAN: >60 ML/MIN
GFR NON-AFRICAN AMERICAN: >60 ML/MIN
GFR SERPL CREATININE-BSD FRML MDRD: ABNORMAL ML/MIN/{1.73_M2}
GFR SERPL CREATININE-BSD FRML MDRD: ABNORMAL ML/MIN/{1.73_M2}
GLUCOSE BLD-MCNC: 102 MG/DL (ref 70–99)
HCT VFR BLD CALC: 43.7 % (ref 40.7–50.3)
HEMOGLOBIN: 14.5 G/DL (ref 13–17)
IMMATURE GRANULOCYTES: 0 %
LYMPHOCYTES # BLD: 22 % (ref 24–43)
MCH RBC QN AUTO: 27.4 PG (ref 25.2–33.5)
MCHC RBC AUTO-ENTMCNC: 33.2 G/DL (ref 28.4–34.8)
MCV RBC AUTO: 82.5 FL (ref 82.6–102.9)
MONOCYTES # BLD: 14 % (ref 3–12)
NRBC AUTOMATED: 0 PER 100 WBC
PDW BLD-RTO: 12.6 % (ref 11.8–14.4)
PLATELET # BLD: 228 K/UL (ref 138–453)
PLATELET ESTIMATE: ABNORMAL
PMV BLD AUTO: 9.7 FL (ref 8.1–13.5)
POC IONIZED CALCIUM: 1.21 MMOL/L (ref 1.15–1.33)
POTASSIUM SERPL-SCNC: 4.1 MMOL/L (ref 3.7–5.3)
RBC # BLD: 5.3 M/UL (ref 4.21–5.77)
RBC # BLD: ABNORMAL 10*6/UL
SEG NEUTROPHILS: 62 % (ref 36–65)
SEGMENTED NEUTROPHILS ABSOLUTE COUNT: 4.24 K/UL (ref 1.5–8.1)
SODIUM BLD-SCNC: 135 MMOL/L (ref 135–144)
WBC # BLD: 6.8 K/UL (ref 3.5–11.3)
WBC # BLD: ABNORMAL 10*3/UL

## 2021-10-06 PROCEDURE — 94761 N-INVAS EAR/PLS OXIMETRY MLT: CPT

## 2021-10-06 PROCEDURE — 82330 ASSAY OF CALCIUM: CPT

## 2021-10-06 PROCEDURE — 6370000000 HC RX 637 (ALT 250 FOR IP): Performed by: PSYCHIATRY & NEUROLOGY

## 2021-10-06 PROCEDURE — 36415 COLL VENOUS BLD VENIPUNCTURE: CPT

## 2021-10-06 PROCEDURE — 85025 COMPLETE CBC W/AUTO DIFF WBC: CPT

## 2021-10-06 PROCEDURE — 6370000000 HC RX 637 (ALT 250 FOR IP): Performed by: HOSPITALIST

## 2021-10-06 PROCEDURE — 94799 UNLISTED PULMONARY SVC/PX: CPT

## 2021-10-06 PROCEDURE — 2060000000 HC ICU INTERMEDIATE R&B

## 2021-10-06 PROCEDURE — 80048 BASIC METABOLIC PNL TOTAL CA: CPT

## 2021-10-06 PROCEDURE — P9041 ALBUMIN (HUMAN),5%, 50ML: HCPCS | Performed by: PSYCHIATRY & NEUROLOGY

## 2021-10-06 PROCEDURE — 6A551Z3 PHERESIS OF PLASMA, MULTIPLE: ICD-10-PCS | Performed by: PSYCHIATRY & NEUROLOGY

## 2021-10-06 PROCEDURE — 2580000003 HC RX 258: Performed by: FAMILY MEDICINE

## 2021-10-06 PROCEDURE — 6370000000 HC RX 637 (ALT 250 FOR IP): Performed by: FAMILY MEDICINE

## 2021-10-06 PROCEDURE — 6360000002 HC RX W HCPCS: Performed by: FAMILY MEDICINE

## 2021-10-06 PROCEDURE — 36516 APHERESIS IMMUNOADS SLCTV: CPT

## 2021-10-06 PROCEDURE — 2580000003 HC RX 258: Performed by: PSYCHIATRY & NEUROLOGY

## 2021-10-06 PROCEDURE — 85384 FIBRINOGEN ACTIVITY: CPT

## 2021-10-06 PROCEDURE — 99233 SBSQ HOSP IP/OBS HIGH 50: CPT | Performed by: PSYCHIATRY & NEUROLOGY

## 2021-10-06 PROCEDURE — 97110 THERAPEUTIC EXERCISES: CPT

## 2021-10-06 PROCEDURE — 6360000002 HC RX W HCPCS: Performed by: PSYCHIATRY & NEUROLOGY

## 2021-10-06 PROCEDURE — 97535 SELF CARE MNGMENT TRAINING: CPT

## 2021-10-06 RX ORDER — HEPARIN SODIUM 5000 [USP'U]/ML
15000 INJECTION, SOLUTION INTRAVENOUS; SUBCUTANEOUS ONCE
Status: DISCONTINUED | OUTPATIENT
Start: 2021-10-08 | End: 2021-10-14 | Stop reason: HOSPADM

## 2021-10-06 RX ORDER — IBUPROFEN 800 MG/1
800 TABLET ORAL EVERY 8 HOURS PRN
Status: DISCONTINUED | OUTPATIENT
Start: 2021-10-06 | End: 2021-10-14 | Stop reason: HOSPADM

## 2021-10-06 RX ORDER — ALBUMIN, HUMAN INJ 5% 5 %
200 SOLUTION INTRAVENOUS ONCE
Status: DISCONTINUED | OUTPATIENT
Start: 2021-10-08 | End: 2021-10-14 | Stop reason: HOSPADM

## 2021-10-06 RX ORDER — HEPARIN SODIUM 5000 [USP'U]/ML
25000 INJECTION, SOLUTION INTRAVENOUS; SUBCUTANEOUS ONCE
Status: COMPLETED | OUTPATIENT
Start: 2021-10-06 | End: 2021-10-06

## 2021-10-06 RX ORDER — HEPARIN SODIUM 5000 [USP'U]/ML
25000 INJECTION, SOLUTION INTRAVENOUS; SUBCUTANEOUS ONCE
Status: DISCONTINUED | OUTPATIENT
Start: 2021-10-08 | End: 2021-10-06

## 2021-10-06 RX ORDER — ALBUMIN, HUMAN INJ 5% 5 %
200 SOLUTION INTRAVENOUS ONCE
Status: COMPLETED | OUTPATIENT
Start: 2021-10-06 | End: 2021-10-06

## 2021-10-06 RX ADMIN — ALPRAZOLAM 0.25 MG: 0.25 TABLET ORAL at 04:22

## 2021-10-06 RX ADMIN — GABAPENTIN 300 MG: 300 CAPSULE ORAL at 08:31

## 2021-10-06 RX ADMIN — GABAPENTIN 600 MG: 300 CAPSULE ORAL at 20:33

## 2021-10-06 RX ADMIN — SODIUM CHLORIDE, PRESERVATIVE FREE 10 ML: 5 INJECTION INTRAVENOUS at 09:57

## 2021-10-06 RX ADMIN — SODIUM CHLORIDE, PRESERVATIVE FREE 10 ML: 5 INJECTION INTRAVENOUS at 20:38

## 2021-10-06 RX ADMIN — SENNOSIDES 8.6 MG: 8.6 TABLET, COATED ORAL at 05:38

## 2021-10-06 RX ADMIN — ALBUMIN (HUMAN) 200 G: 12.5 INJECTION, SOLUTION INTRAVENOUS at 11:41

## 2021-10-06 RX ADMIN — GABAPENTIN 300 MG: 300 CAPSULE ORAL at 14:40

## 2021-10-06 RX ADMIN — DOCUSATE SODIUM 100 MG: 100 CAPSULE, LIQUID FILLED ORAL at 08:31

## 2021-10-06 RX ADMIN — MAGNESIUM HYDROXIDE 30 ML: 2400 SUSPENSION ORAL at 05:37

## 2021-10-06 RX ADMIN — IBUPROFEN 800 MG: 800 TABLET, FILM COATED ORAL at 05:38

## 2021-10-06 RX ADMIN — CALCIUM GLUCONATE 2000 MG: 98 INJECTION, SOLUTION INTRAVENOUS at 11:43

## 2021-10-06 RX ADMIN — ZOLPIDEM TARTRATE 5 MG: 5 TABLET ORAL at 20:32

## 2021-10-06 RX ADMIN — FAMOTIDINE 20 MG: 20 TABLET, FILM COATED ORAL at 08:31

## 2021-10-06 RX ADMIN — IBUPROFEN 800 MG: 800 TABLET, FILM COATED ORAL at 22:51

## 2021-10-06 RX ADMIN — SENNOSIDES 8.6 MG: 8.6 TABLET, COATED ORAL at 20:32

## 2021-10-06 RX ADMIN — ENOXAPARIN SODIUM 40 MG: 40 INJECTION SUBCUTANEOUS at 08:31

## 2021-10-06 RX ADMIN — FAMOTIDINE 20 MG: 20 TABLET, FILM COATED ORAL at 20:32

## 2021-10-06 RX ADMIN — HEPARIN SODIUM 25000 UNITS: 5000 INJECTION INTRAVENOUS; SUBCUTANEOUS at 14:05

## 2021-10-06 RX ADMIN — AMLODIPINE BESYLATE 10 MG: 10 TABLET ORAL at 08:31

## 2021-10-06 RX ADMIN — IBUPROFEN 800 MG: 800 TABLET, FILM COATED ORAL at 14:40

## 2021-10-06 ASSESSMENT — PAIN SCALES - GENERAL
PAINLEVEL_OUTOF10: 3
PAINLEVEL_OUTOF10: 7
PAINLEVEL_OUTOF10: 5

## 2021-10-06 NOTE — PROGRESS NOTES
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 Low back pain . The condition is FU Head CT at family request normal . He received first plasma exchange today . He has bilateral left facial weakness with only minor left lower facial contraction none on on right  Partial ability to close both eyes . Mild left facial contraction . None on right . Deltoids 4-/5 , biceps 4+/5 . Iliopsas and hamstrings , ADF 4+/5 . Low back pain is partially attenuated with motrin 800 mg po tid . 52 yo male with Guillain [place] undergoing IV IgG today being day 5 out of 5 treatments 0.4 gram/kg qd x 5 days . He presents with 4 days of weakness of legs and leg numbness along with arm weakness and right facial weakness that developed in hospital. Head CT normal . CT cervical and thoracic spine normal . CT lumbar sine with left L5-S1 disc protrusion . CSF analysis today 4 WBC , 8 RBC , total protein 273 and glucose 60. He has slow gait with rolling walker with moderate assist walking short distance . He is complaining of low back pain with history of disc herniation . Significant medications Neurontin 300 mg po bid and 600 mg po qhs , motrin 800mg po tid. Testing  Head CT normal . CT cervical and thoracic spine normal . CT lumbar spine with left L5-S1 disc protrusion . CSF analysis today 4 WBC , 8 RBC , total protein 273 and glucose 60 . IgG 0.4 gram/kg qd x 5 days. FU Head CT normal      Past Medical History:   Diagnosis Date    Herniation of intervertebral disc between L5 and S1     Low back pain     Sciatica        Past Surgical History:   Procedure Laterality Date    HERNIA REPAIR Right     inguinal    IR NONTUNNELED VASCULAR CATHETER  10/5/2021    IR NONTUNNELED VASCULAR CATHETER 10/5/2021 MD MARGARIT[facility] Evangel[facility]       History reviewed. No pertinent family history.     Social History     Socioeconomic History    Marital status:      Spouse name: None    Number of children: None    Years of education: None    Highest education level: None   Occupational History    None   Tobacco Use    Smoking status: Former Smoker     Quit date: 2014     Years since quittin.0    Smokeless tobacco: Never Used   Substance and Sexual Activity    Alcohol use: Not Currently    Drug use: Not Currently    Sexual activity: None   Other Topics Concern    None   Social History Narrative    None     Social Determinants of Health     Financial Resource Strain:     Difficulty of Paying Living Expenses:    Food Insecurity:     Worried About Running Out of Food in the Last Year:     Ran Out of Food in the Last Year:    Transportation Needs:     Lack of Transportation (Medical):      Lack of Transportation (Non-Medical):    Physical Activity:     Days of Exercise per Week:     Minutes of Exercise per Session:    Stress:     Feeling of Stress :    Social Connections:     Frequency of Communication with Friends and Family:     Frequency of Social Gatherings with Friends and Family:     Attends Sikh Services:     Active Member of Clubs or Organizations:     Attends Club or Organization Meetings:     Marital Status:    Intimate Partner Violence:     Fear of Current or Ex-Partner:     Emotionally Abused:     Physically Abused:     Sexually Abused:        Current Facility-Administered Medications   Medication Dose Route Frequency Provider Last Rate Last Admin    heparin (porcine) injection 25,000 Units  25,000 Units IntraCATHeter Once Fernie Eng MD        albumin human 5 % IV solution 200 g  200 g IntraVENous Once Fernie Eng MD        ibuprofen (ADVIL;MOTRIN) tablet 800 mg  800 mg Oral TID Fernie Eng MD   800 mg at 10/06/21 0538    acetaminophen (TYLENOL) tablet 650 mg  650 mg Oral Q4H PRN Ana Stroud MD        calcium gluconate 2,000 mg in sodium chloride 0.9 % 100 mL IVPB  2,000 mg IntraVENous Once Ermelinda Callejas MD        calcium gluconate 1,000 mg in sodium chloride 0.9 % 100 mL IVPB  1,000 mg IntraVENous PRN Ermelinda Callejas MD        amLODIPine [facility]) tablet 10 mg  10 mg Oral Daily Albaro Spencer MD   10 mg at 10/06/21 0831    hydrALAZINE (APRESOLINE) injection 10 mg  10 mg IntraVENous Q6H PRN Albaro Spencer MD   10 mg at 10/05/21 0219    ALPRAZolam (XANAX) tablet 0.25 mg  0.25 mg Oral BID PRN Albaro Spencer MD   0.25 mg at 10/06/21 0422    gabapentin (NEURONTIN) capsule 600 mg  600 mg Oral Nightly Rosalia Medrano MD   600 mg at 10/05/21 2044    gabapentin (NEURONTIN) capsule 300 mg  300 mg Oral BID Rosalia Medrano MD   300 mg at 10/06/21 0831    senna (SENOKOT) tablet 8.6 mg  1 tablet Oral BID PRN Albaro Spencer MD   8.6 mg at 10/06/21 0538    docusate sodium (COLACE) capsule 100 mg  100 mg Oral Daily Albaro Spencer MD   100 mg at 10/06/21 0831    aluminum & magnesium hydroxide-simethicone (MAALOX) 200-200-20 MG/5ML suspension 30 mL  30 mL Oral TID PRN Albaro Spencer MD   30 mL at 10/04/21 1736    famotidine (PEPCID) tablet 20 mg  20 mg Oral BID Cheyenne Guo MD   20 mg at 10/06/21 0831    sodium chloride flush 0.9 % injection 10 mL  10 mL IntraVENous 2 times per day Cheyenne Guo MD   10 mL at 10/06/21 0957    sodium chloride flush 0.9 % injection 10 mL  10 mL IntraVENous PRN Albaro Spencer MD        0.9 % sodium chloride infusion  25 mL IntraVENous PRN Albaro Spencer  mL/hr at 10/02/21 1339 25 mL at 10/02/21 1339    ondansetron (ZOFRAN-ODT) disintegrating tablet 4 mg  4 mg Oral Q8H PRN Albaro Spencer MD   4 mg at 09/30/21 1514    Or    ondansetron (ZOFRAN) injection 4 mg  4 mg IntraVENous Q6H PRN Albaro Spencer MD   4 mg at 10/03/21 0433    magnesium hydroxide (MILK OF MAGNESIA) 400 MG/5ML suspension 30 mL  30 mL Oral Daily PRN Albaro Spencer MD   30 mL at 10/06/21 0537    enoxaparin (LOVENOX) injection 40 mg  40 mg SubCUTAneous Daily Cheyenne Guo MD   40 concentration normal  Short term memory normal  Language process and speech normal . No aphasia   Cranial nerve 2 normal acuety and visual fields  Cranial nerve 3, 4 and 6 . Extraocular muscles are intact . Pupils are equal and reactive   Cranial nerve 5 . Normal strength of masseter and temporalis . Intact corneal reflex. Normal facial sensation  Cranial nerve 7 minor left lower facial contraction none on on right  Partial ability to close both eyes . Mild left lower facial contraction . None on right lower facial weakness . Decrease resistance right eye closure . Decrease right frontalis contraction . None left   Cranial nerve 8. Grossly intact hearing   Cranial nerve 9 and 10. Symmetric palate elevation   Cranial nerve 11 , 5 out of 5 strength   Cranial Nerve 12 midline tongue . 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 Low back pain     Plan:    Continue with plasma exchange .  Total of 5 exchanges every other day

## 2021-10-06 NOTE — PROGRESS NOTES
Writer spoke with Luis F Blood GREG of [place] that 1st treatment of plasmapheresis has been completed. Patient tolerated well. Luis F Blood GREG informed writer that next scheduled treatment would be 10/8/21     Ace inhibitors are still to be placed on hold throughout treatment     Please place appropriate lab orders to be drawn with morning labs for each day of treatment.   Including -  CBC with Diff  BMP  Ionized Calcium  Fibrinogen level     Catheter placed is heparin locked per IR staff.      Paperwork with all necessary orders and labs placed in patient's chart for reference.     ____________________________      The above mentioned labs and orders have been placed for treatment scheduled on 10/8/21.

## 2021-10-06 NOTE — PROGRESS NOTES
[facility] Hospitalist        10/6/2021   4:26 PM    Name:  Sergey Lyons  MRN:    [id number]     Acct:     [de-identified]   Room:  2041/2041-01  IP Day: 6     Admit Date: 9/29/2021  8:10 PM  PCP: Bernice Fitch MD    C/C:   Chief Complaint   Patient presents with    Extremity Weakness    Hand Numbness       Assessment:      · Guillain [place] syndrome  · Upper and lower extremity weakness/Paresthesias  · Worsening left-sided facial droop  · Lumbar arthritis/Low back pain  · Lumbar herniated disc  · Acute hypoxic respitratory failure  · Fall  · Essential Hypertension  · Mixed hyperlipidemia   · Constipation    · GERD without esophagitis   · Anxiety disorder  · Insomnia       Plan:        · Patient is currently in ICU  · Altered mental status General 92%  · Neurochecks per protocol  · S/p IVIG x5  · Plasmapheresis x5, started on 10/6/2021  · Speech evaluation  · CT brain no acute changes  · CT abdomen and pelvis without contrast no acute changes  · Neurology following  · DVT and GI prophylaxis  · Continue to monitor/telemetry/CBC with differential daily/BMP daily  · Continue medications as below    Scheduled Meds:   [START ON 10/8/2021] albumin human  200 g IntraVENous Once    [START ON 10/8/2021] calcium gluconate IVPB  2,000 mg IntraVENous Once    [START ON 10/8/2021] heparin (porcine)  15,000 Units IntraCATHeter Once    ibuprofen  800 mg Oral TID    amLODIPine  10 mg Oral Daily    gabapentin  600 mg Oral Nightly    gabapentin  300 mg Oral BID    docusate sodium  100 mg Oral Daily    famotidine  20 mg Oral BID    sodium chloride flush  10 mL IntraVENous 2 times per day    enoxaparin  40 mg SubCUTAneous Daily     Continuous Infusions:   sodium chloride 25 mL (10/02/21 1339)     PRN Meds:  acetaminophen, 650 mg, Q4H PRN  calcium gluconate IVPB, 1,000 mg, PRN  hydrALAZINE, 10 mg, Q6H PRN  ALPRAZolam, 0.25 mg, BID PRN  senna, 1 tablet, BID PRN  aluminum & magnesium rashes, or induration noted        Medications:      Allergies: No Known Allergies    Current Meds:   Current Facility-Administered Medications:     [START ON 10/8/2021] albumin human 5 % IV solution 200 g, 200 g, IntraVENous, Once, Cory Webster MD    [START ON 10/8/2021] calcium gluconate 2,000 mg in sodium chloride 0.9 % 100 mL IVPB, 2,000 mg, IntraVENous, Once, Cory Webster MD  Olga Mcgregorumb ON 10/8/2021] heparin (porcine) injection 15,000 Units, 15,000 Units, IntraCATHeter, Once, Coyr Webster MD    ibuprofen (ADVIL;MOTRIN) tablet 800 mg, 800 mg, Oral, TID, Cory Webster MD, 800 mg at 10/06/21 1440    acetaminophen (TYLENOL) tablet 650 mg, 650 mg, Oral, Q4H PRN, Dimitri Lemon MD    calcium gluconate 1,000 mg in sodium chloride 0.9 % 100 mL IVPB, 1,000 mg, IntraVENous, PRN, Cory Webster MD    amLODIPine (NORVASC) tablet 10 mg, 10 mg, Oral, Daily, Albaro Spencer MD, 10 mg at 10/06/21 0831    hydrALAZINE (APRESOLINE) injection 10 mg, 10 mg, IntraVENous, Q6H PRN, Zion Molina MD, 10 mg at 10/05/21 0219    ALPRAZolam (XANAX) tablet 0.25 mg, 0.25 mg, Oral, BID PRN, Zion Molina MD, 0.25 mg at 10/06/21 0422    gabapentin (NEURONTIN) capsule 600 mg, 600 mg, Oral, Nightly, Catrachito Cardoza MD, 600 mg at 10/05/21 2044    gabapentin (NEURONTIN) capsule 300 mg, 300 mg, Oral, BID, Catrachito Cardoza MD, 300 mg at 10/06/21 1440    senna (SENOKOT) tablet 8.6 mg, 1 tablet, Oral, BID PRN, Zion Molina MD, 8.6 mg at 10/06/21 0538    docusate sodium (COLACE) capsule 100 mg, 100 mg, Oral, Daily, Zion Molina MD, 100 mg at 10/06/21 0831    aluminum & magnesium hydroxide-simethicone (MAALOX) 200-200-20 MG/5ML suspension 30 mL, 30 mL, Oral, TID PRN, Albaro Spencer MD, 30 mL at 10/04/21 1736    famotidine (PEPCID) tablet 20 mg, 20 mg, Oral, BID, Albaro Spencer MD, 20 mg at 10/06/21 0831    sodium chloride flush 0.9 % injection 10 mL, 10 mL, IntraVENous, 2 times per day, Kirk Veloz MD, 10 mL at 10/06/21 0957    sodium chloride flush 0.9 % injection 10 mL, 10 mL, IntraVENous, PRN, Albaro Spencer MD    0.9 % sodium chloride infusion, 25 mL, IntraVENous, PRN, Albaro Spencer MD, Last Rate: 100 mL/hr at 10/02/21 1339, 25 mL at 10/02/21 1339    ondansetron (ZOFRAN-ODT) disintegrating tablet 4 mg, 4 mg, Oral, Q8H PRN, 4 mg at 09/30/21 1514 **OR** ondansetron (ZOFRAN) injection 4 mg, 4 mg, IntraVENous, Q6H PRN, Albaro Spencer MD, 4 mg at 10/03/21 0433    magnesium hydroxide (MILK OF MAGNESIA) 400 MG/5ML suspension 30 mL, 30 mL, Oral, Daily PRN, Albaro Spencer MD, 30 mL at 10/06/21 0537    enoxaparin (LOVENOX) injection 40 mg, 40 mg, SubCUTAneous, Daily, Albaro Spencer MD, 40 mg at 10/06/21 0831    diphenhydrAMINE (BENADRYL) tablet 25 mg, 25 mg, Oral, Q6H PRN, Arnaldo Alvarez MD    zolpidem [facility] [place] [facility]) tablet 5 mg, 5 mg, Oral, Nightly PRN, Arnaldo Alvarez MD, 5 mg at 10/04/21 2157    HYDROcodone-acetaminophen (NORCO) 5-325 MG per tablet 1 tablet, 1 tablet, Oral, Q4H PRN, Kirk Veloz MD, 1 tablet at 10/05/21 0275      I/O (24Hr):     Intake/Output Summary (Last 24 hours) at 10/6/2021 1626  Last data filed at 10/6/2021 0634  Gross per 24 hour   Intake    Output 300 ml   Net -300 ml       Data:           Labs:    Hematology:  Recent Labs     10/04/21  0505 10/04/21  0940 10/05/21  0500 10/05/21  1732 10/06/21  0458   WBC 7.7  --  6.9  --  6.8   RBC 4.97  --  5.34  --  5.30   HGB 13.8  --  14.7  --  14.5   HCT 41.0  --  43.1  --  43.7   MCV 82.5*  --  80.7*  --  82.5*   MCH 27.8  --  27.5  --  27.4   MCHC 33.7  --  34.1  --  33.2   RDW 12.3  --  12.6  --  12.6     --  218  --  228   MPV 9.7  --  9.8  --  9.7   INR  --  1.1  --  1.1  --      Chemistry:  Recent Labs     10/04/21  0505 10/05/21  0500 10/05/21  4172 10/06/21  0491    136  --  135   K 4.2 3.9  --  4.1    103  --  101   CO2 22 21  --  22   GLUCOSE 103* 101*  --  102*   BUN 10 13  --  14 CREATININE 1.08 1.00  --  1.12   MG  --   --  2.0  --    ANIONGAP 11 12  --  12   LABGLOM >60 >60  --  >60   GFRAA >60 >60  --  >60   CALCIUM 8.7 8.7  --  8.9   CAION  --   --   --  1.27   PHOS  --   --  3.6  --      Recent Labs     10/05/21  1732          No results found for: SPECIAL  No results found for: CULTURE    No results found for: POCPH, PHART, PH, POCPCO2, WDI1QYC, PCO2, POCPO2, PO2ART, PO2, POCHCO3, KGH2ITO, HCO3, NBEA, PBEA, BEART, BE, THGBART, THB, DYG5RPD, QSPD2GKV, T3NTSZNV, O2SAT, FIO2    Radiology:    CT Head WO Contrast    Result Date: 9/29/2021  No acute intracranial abnormality. CT CERVICAL SPINE WO CONTRAST    Result Date: 9/29/2021  No acute abnormality of the cervical spine. CT THORACIC SPINE WO CONTRAST    Result Date: 9/29/2021  1. No large disc herniation or protrusion by CT. 2. Other findings as described. CT Lumbar Spine WO Contrast    Result Date: 9/29/2021  1. No acute fracture. No listhesis. 2. Other findings as described. IR LUMBAR PUNCTURE FOR DIAGNOSIS    Result Date: 10/4/2021  Successful fluoroscopic-guided lumbar puncture. All radiological studies reviewed  Code Status:  Full Code        Electronically signed by Mihir Flores MD on 10/6/2021 at 4:26 PM    This note was created with the assistance of a speech-recognition program.  Although the intention is to generate a document that actually reflects the content of the visit, no guarantees can be provided that every mistake has been identified and corrected by editing. Note was updated later by me after  physical examination and  completion of the assessment.

## 2021-10-06 NOTE — PROGRESS NOTES
Physical Therapy  DATE: 10/6/2021    NAME: Catia Eddy  MRN: [id number]   : 1975    Patient not seen this date for Physical Therapy due to:      [] Cancel by RN or physician due to:    [] Hemodialysis    [] Critical Lab Value Level     [] Blood transfusion in progress    [] Acute or unstable cardiovascular status   _MAP < 55 or more than >115  _HR < 40 or > 130    [] Acute or unstable pulmonary status   -FiO2 > 60%   _RR < 5 or >40    _O2 sats < 85%    [] Strict Bedrest    [] Off Unit for surgery or procedure    [] Off Unit for testing       [] Pending imaging to R/O fracture    [] Refusal by Patient      [x] Other (Patient beginning Plasmapheresis treatment)      [] PT being discontinued at this time. Patient independent. No further needs. [] PT being discontinued at this time as the patient has been transferred to hospice care. No further needs.       Isidra Hopkins, PTA

## 2021-10-06 NOTE — PROGRESS NOTES
Occupational Therapy  [facility]  Daily Treatment Note  NAME: Chele Simmons  : 1975  MRN: [id number]    Date of Service: 10/6/2021    Discharge Recommendations:  Patient would benefit from continued therapy after discharge       Assessment   Performance deficits / Impairments: Decreased functional mobility ; Decreased ADL status; Decreased strength;Decreased safe awareness;Decreased endurance;Decreased sensation;Decreased balance;Decreased coordination;Decreased fine motor control  Assessment: At this time, pt contiues to remain motivated and ready to begin treatment. Pt. completed ADL transfer with use of RW/min A to ambulate to toilet. Pt. completed sinkside grooming task to tolerate 3-4 min standing. Pt. provided with verbal cues on proper  with grab bars and RW throughout treatment. Pt. sat in bedside chair to complete UE strengthening/resistive exercise to complete 4 sets. Pt. is limited with fatigue and weakness for overall function of ADLS. Skilled OT is warranted to focus on increasing mobility and functional endurance to return to prior living arrangements and with assist as needed. Prognosis: Good  OT Education: OT Role;Plan of Care;Home Exercise Program;Precautions; ADL Adaptive Strategies;Transfer Training;Equipment; Energy Conservation; Family Education  Patient Education: Pt. educated on home safety with ADLS and pacing self to allow body to recover from fatigue. Wife present for treatment. Pt and wife very attentive and receptive to all information. REQUIRES OT FOLLOW UP: Yes  Activity Tolerance  Activity Tolerance: Patient Tolerated treatment well;Patient limited by fatigue  Activity Tolerance: Pt. limited by faigue and required cues to pace self allowing body to recover from activity. O2 monitored with all activity with range from 92-99% saturation. Safety Devices  Safety Devices in place: Yes  Type of devices:  All fall risk precautions in place;Gait belt;Left in chair;Nurse notified;Call light within reach         Patient Diagnosis(es): The primary encounter diagnosis was Paresthesia of both lower extremities. Diagnoses of Generalized weakness, Paresthesia of both hands, and Chronic bilateral low back pain with bilateral sciatica were also pertinent to this visit. has a past medical history of Herniation of intervertebral disc between L5 and S1, Low back pain, and Sciatica. has a past surgical history that includes hernia repair (Right) and IR NONTUNNELED VASCULAR CATHETER > 5 YEARS (10/5/2021). Restrictions  Restrictions/Precautions  Restrictions/Precautions: General Precautions, Fall Risk  Required Braces or Orthoses?: No  Position Activity Restriction  Other position/activity restrictions: LUE IV, O2 NC, telemetry  Subjective   General  Chart Reviewed: Yes  Patient assessed for rehabilitation services?: Yes  Response to previous treatment: Patient with no complaints from previous session  Family / Caregiver Present: Yes (wife present)  General Comment  Comments: \"I'm ready for a good workout\"      Orientation  Orientation  Overall Orientation Status: Within Normal Limits  Objective    ADL  Grooming: Setup;Minimal assistance (Min assist x1 with use of RW to wash hands at sink.)  LE Dressing: Minimal assistance (Min assist to manage clothing while standing at toilet.)  Toileting: Minimal assistance; Increased time to complete (Min assist for toilet transfer with vebal cues on use of grab bars/placement of RW)  Additional Comments: Pt. completed toilet transfer with min assist x1 with use of gait belt and RW. Balance  Sitting Balance: Supervision  Standing Balance: Minimal assistance  Standing Balance  Time: standing tolerance at sink side 3-4 min.   Functional Mobility  Functional - Mobility Device: Rolling Walker  Toilet Transfers  Toilet - Technique: Ambulating  Equipment Used: Standard toilet  Toilet Transfer: Minimal assistance  Toilet Transfers Comments: Min verbal cues provided for proper use of grab bars and positioning of walker. Bed mobility  Rolling to Left: Independent  Rolling to Right: Independent  Supine to Sit: Independent  Sit to Supine: Independent  Scooting: Independent  Transfers  Sit to stand: Minimal assistance (x1)  Stand to sit: Minimal assistance (x1)  Transfer Comments: Pt. stood up with min assist x1 with good recall on proper  with use of siderail. Pt. ambulated to toilet with use of RW Min A. Cognition  Overall Cognitive Status: WFL     Perception  Overall Perceptual Status: WFL              Type of ROM/Therapeutic Exercise  Type of ROM/Therapeutic Exercise: Resistive Bands;AROM  Comment: Pt. sat up in bedside chair to complete 4 sets of resisitive theraband exercises in sets of 10-15 reps each. Exercises  Shoulder Flexion: x15  Shoulder Extension: x15  Horizontal ABduction: x10  Horizontal ADduction: x10  Elbow Flexion: x15  Elbow Extension: x15  Other: Pt. required min verbal cues to rest between sets with education on proper breathing tech during exercise to maintain healthy O2 levels. Plan   Plan  Times per week: 4-5x/week, 1-2x/day  Times per day: Daily  Current Treatment Recommendations: Strengthening, Balance Training, Functional Mobility Training, Endurance Training, Safety Education & Training, Self-Care / ADL, Patient/Caregiver Education & Training, Equipment Evaluation, Education, & procurement, Positioning  Plan Comment: Cont with same POC achieve stated goals.                             AM-PAC Score        AM-PAC Inpatient Daily Activity Raw Score: 16 (10/06/21 1208)  AM-PAC Inpatient ADL T-Scale Score : 35.96 (10/06/21 1208)  ADL Inpatient CMS 0-100% Score: 53.32 (10/06/21 1208)  ADL Inpatient CMS G-Code Modifier : CK (10/06/21 1208)    Goals  Short term goals  Time Frame for Short term goals: by discharge, pt will  Short term goal 1: demo CGA with ADL transfers with approp AD/DME and good safety  Short term goal 2: demo and verb good understanding of ed provided on fall prevention techs, equip needs, d/c recommendations, and EC/WS techs  Short term goal 3: demo CGA with toileting routine at approp level with good safety, DME as needed  Short term goal 4: demo SBA with UB ADls and min A LB ADLs with DME as needed and good safety/pacing  Short term goal 5: demo I/S with B UE HEP for inc strength in all muscle groups for inc. functional strength to complete ADLs and functional transfers  Long term goals  Long term goal 1: demo CGA with functional mob in room distances with good safety/pacing for ADL completion with approp AD  Patient Goals   Patient goals : to return home, full function       Therapy Time   Individual Concurrent Group Co-treatment   Time In 0938         Time Out 1035         Minutes 62              RN reports patient is medically stable for therapy treatment this date. Chart reviewed prior to treatment and patient is agreeable for therapy. All lines intact and patient positioned comfortably at end of treatment. All patient needs addressed prior to ending therapy session.       GASTON Pepper

## 2021-10-06 NOTE — PROGRESS NOTES
Speech Language Pathology  [facility]  Speech Language Pathology    Date: 10/6/2021  Patient Name: Anival Quezada  YOB: 1975   AGE: 51 y.o. MRN: [id number]        Patient Not Available for Speech Therapy     Due to:  [] Testing  [] Hemodialysis  [] Cancelled by RN  [] Surgery   [] Intubation/Sedation/Pain Medication  [] Medical instability  [x] Other:  Spoke with RN. RN reports that pt with no new difficulty swallowing at this time. Pt. Underwent ST cognitive eval 9/30 with no deficits noted. Pt. With facial droop and GB. Pt. To start plasmapheresis today. ST to follow and address facial weakness following plasmapheresis. Next scheduled treatment: RN to contact ST should pt. Demonstrate new difficulty with swallowing. Completed by:  Federica Jc, SLP, M.S. [id number] [facility]

## 2021-10-06 NOTE — PLAN OF CARE
Problem: Falls - Risk of:  Goal: Will remain free from falls  Description: Will remain free from falls  Outcome: Ongoing     Problem: Falls - Risk of:  Goal: Absence of physical injury  Description: Absence of physical injury  Outcome: Ongoing     Problem: Infection:  Goal: Will remain free from infection  Description: Will remain free from infection  Outcome: Ongoing     Problem: Safety:  Goal: Free from accidental physical injury  Description: Free from accidental physical injury  Outcome: Ongoing     Problem: Safety:  Goal: Free from intentional harm  Description: Free from intentional harm  Outcome: Ongoing     Problem: Safety:  Goal: Ability to chew and swallow food without choking will improve  Description: Ability to chew and swallow food without choking will improve  Outcome: Ongoing     Problem: Safety:  Goal: Ability to demonstrate good, daily oral hygiene techniques will improve  Description: Ability to demonstrate good, daily oral hygiene techniques will improve  Outcome: Ongoing     Problem: Safety:  Goal: Maintenance of upright position during and after feeding  Description: Maintenance of upright position during and after feeding  Outcome: Ongoing     Problem: Pain:  Goal: Patient's pain/discomfort is manageable  Description: Patient's pain/discomfort is manageable  Outcome: Ongoing     Problem: Pain:  Goal: Pain level will decrease  Description: Pain level will decrease  Outcome: Ongoing     Problem: Pain:  Goal: Control of acute pain  Description: Control of acute pain  Outcome: Ongoing     Problem: Pain:  Goal: Control of acute pain  Description: Control of acute pain  Outcome: Ongoing     Problem: Pain:  Goal: Control of chronic pain  Description: Control of chronic pain  Outcome: Ongoing     Problem: Skin Integrity:  Goal: Skin integrity will stabilize  Description: Skin integrity will stabilize  Outcome: Ongoing     Problem: Respiratory:  Goal: Will remain free from infection  Description: Will remain free from infection  Outcome: Ongoing

## 2021-10-07 LAB
ANION GAP SERPL CALCULATED.3IONS-SCNC: 11 MMOL/L (ref 9–17)
BUN BLDV-MCNC: 14 MG/DL (ref 6–20)
BUN/CREAT BLD: 12 (ref 9–20)
CALCIUM SERPL-MCNC: 8.7 MG/DL (ref 8.6–10.4)
CHLORIDE BLD-SCNC: 106 MMOL/L (ref 98–107)
CO2: 22 MMOL/L (ref 20–31)
CREAT SERPL-MCNC: 1.19 MG/DL (ref 0.7–1.2)
GFR AFRICAN AMERICAN: >60 ML/MIN
GFR NON-AFRICAN AMERICAN: >60 ML/MIN
GFR SERPL CREATININE-BSD FRML MDRD: NORMAL ML/MIN/{1.73_M2}
GFR SERPL CREATININE-BSD FRML MDRD: NORMAL ML/MIN/{1.73_M2}
GLUCOSE BLD-MCNC: 98 MG/DL (ref 70–99)
POTASSIUM SERPL-SCNC: 4.3 MMOL/L (ref 3.7–5.3)
SODIUM BLD-SCNC: 139 MMOL/L (ref 135–144)

## 2021-10-07 PROCEDURE — 97530 THERAPEUTIC ACTIVITIES: CPT

## 2021-10-07 PROCEDURE — 97110 THERAPEUTIC EXERCISES: CPT

## 2021-10-07 PROCEDURE — 80048 BASIC METABOLIC PNL TOTAL CA: CPT

## 2021-10-07 PROCEDURE — 97116 GAIT TRAINING THERAPY: CPT

## 2021-10-07 PROCEDURE — 6370000000 HC RX 637 (ALT 250 FOR IP): Performed by: HOSPITALIST

## 2021-10-07 PROCEDURE — 6370000000 HC RX 637 (ALT 250 FOR IP): Performed by: PSYCHIATRY & NEUROLOGY

## 2021-10-07 PROCEDURE — 2580000003 HC RX 258: Performed by: FAMILY MEDICINE

## 2021-10-07 PROCEDURE — 6360000002 HC RX W HCPCS: Performed by: FAMILY MEDICINE

## 2021-10-07 PROCEDURE — 6370000000 HC RX 637 (ALT 250 FOR IP): Performed by: FAMILY MEDICINE

## 2021-10-07 PROCEDURE — 99233 SBSQ HOSP IP/OBS HIGH 50: CPT | Performed by: PSYCHIATRY & NEUROLOGY

## 2021-10-07 PROCEDURE — 36415 COLL VENOUS BLD VENIPUNCTURE: CPT

## 2021-10-07 PROCEDURE — 2060000000 HC ICU INTERMEDIATE R&B

## 2021-10-07 RX ORDER — GABAPENTIN 300 MG/1
600 CAPSULE ORAL 3 TIMES DAILY
Status: DISCONTINUED | OUTPATIENT
Start: 2021-10-07 | End: 2021-10-12

## 2021-10-07 RX ADMIN — GABAPENTIN 600 MG: 300 CAPSULE ORAL at 21:28

## 2021-10-07 RX ADMIN — ALPRAZOLAM 0.25 MG: 0.25 TABLET ORAL at 06:04

## 2021-10-07 RX ADMIN — HYDROCODONE BITARTRATE AND ACETAMINOPHEN 1 TABLET: 5; 325 TABLET ORAL at 10:21

## 2021-10-07 RX ADMIN — AMLODIPINE BESYLATE 10 MG: 10 TABLET ORAL at 08:38

## 2021-10-07 RX ADMIN — SENNOSIDES 8.6 MG: 8.6 TABLET, COATED ORAL at 08:40

## 2021-10-07 RX ADMIN — FAMOTIDINE 20 MG: 20 TABLET, FILM COATED ORAL at 21:28

## 2021-10-07 RX ADMIN — IBUPROFEN 800 MG: 800 TABLET, FILM COATED ORAL at 07:01

## 2021-10-07 RX ADMIN — GABAPENTIN 300 MG: 300 CAPSULE ORAL at 13:46

## 2021-10-07 RX ADMIN — ZOLPIDEM TARTRATE 5 MG: 5 TABLET ORAL at 21:28

## 2021-10-07 RX ADMIN — FAMOTIDINE 20 MG: 20 TABLET, FILM COATED ORAL at 08:40

## 2021-10-07 RX ADMIN — DOCUSATE SODIUM 100 MG: 100 CAPSULE, LIQUID FILLED ORAL at 08:40

## 2021-10-07 RX ADMIN — SODIUM CHLORIDE, PRESERVATIVE FREE 10 ML: 5 INJECTION INTRAVENOUS at 21:29

## 2021-10-07 RX ADMIN — SODIUM CHLORIDE, PRESERVATIVE FREE 10 ML: 5 INJECTION INTRAVENOUS at 08:47

## 2021-10-07 RX ADMIN — HYDROCODONE BITARTRATE AND ACETAMINOPHEN 1 TABLET: 5; 325 TABLET ORAL at 21:28

## 2021-10-07 RX ADMIN — IBUPROFEN 800 MG: 800 TABLET, FILM COATED ORAL at 17:50

## 2021-10-07 RX ADMIN — ENOXAPARIN SODIUM 40 MG: 40 INJECTION SUBCUTANEOUS at 08:46

## 2021-10-07 RX ADMIN — GABAPENTIN 300 MG: 300 CAPSULE ORAL at 08:41

## 2021-10-07 ASSESSMENT — PAIN DESCRIPTION - PAIN TYPE
TYPE: CHRONIC PAIN

## 2021-10-07 ASSESSMENT — PAIN SCALES - GENERAL
PAINLEVEL_OUTOF10: 6
PAINLEVEL_OUTOF10: 6
PAINLEVEL_OUTOF10: 7
PAINLEVEL_OUTOF10: 7
PAINLEVEL_OUTOF10: 6
PAINLEVEL_OUTOF10: 6
PAINLEVEL_OUTOF10: 0
PAINLEVEL_OUTOF10: 6

## 2021-10-07 ASSESSMENT — PAIN DESCRIPTION - LOCATION
LOCATION: BACK

## 2021-10-07 ASSESSMENT — PAIN DESCRIPTION - DESCRIPTORS: DESCRIPTORS: ACHING;BURNING

## 2021-10-07 ASSESSMENT — PAIN DESCRIPTION - DIRECTION: RADIATING_TOWARDS: DOWN LEGS

## 2021-10-07 NOTE — PROGRESS NOTES
[address] Hospitalist        10/7/2021   4:12 PM    Name:  Justin Hood  MRN:    [id number]     Acct:     [de-identified]   Room:  2041/2041-01  IP Day: 7     Admit Date: 9/29/2021  8:10 PM  PCP: Leidy Cheng MD    C/C:   Chief Complaint   Patient presents with    Extremity Weakness    Hand Numbness       Assessment:      · Guillain Carmel syndrome  · Upper and lower extremity weakness/Paresthesias  · Worsening left-sided facial droop  · Lumbar arthritis/Low back pain  · Lumbar herniated disc  · Acute hypoxic respitratory failure  · Fall  · Essential Hypertension  · Mixed hyperlipidemia   · Constipation    · GERD without esophagitis   · Anxiety disorder  · Insomnia       Plan:        · Patient is currently in ICU  · Altered mental status General 92%  · Neurochecks per protocol  · S/p IVIG x5  · Plasmapheresis x5, started on 10/6/2021  · Speech evaluation  · CT brain no acute changes  · CT abdomen and pelvis without contrast no acute changes  · Neurology following  · DVT and GI prophylaxis  · Continue to monitor/telemetry/CBC with differential daily/BMP daily  · Continue medications as below    Scheduled Meds:   [START ON 10/8/2021] albumin human  200 g IntraVENous Once    [START ON 10/8/2021] calcium gluconate IVPB  2,000 mg IntraVENous Once    [START ON 10/8/2021] heparin (porcine)  15,000 Units IntraCATHeter Once    amLODIPine  10 mg Oral Daily    gabapentin  600 mg Oral Nightly    gabapentin  300 mg Oral BID    docusate sodium  100 mg Oral Daily    famotidine  20 mg Oral BID    sodium chloride flush  10 mL IntraVENous 2 times per day    enoxaparin  40 mg SubCUTAneous Daily     Continuous Infusions:   sodium chloride 25 mL (10/02/21 1339)     PRN Meds:  ibuprofen, 800 mg, Q8H PRN  acetaminophen, 650 mg, Q4H PRN  calcium gluconate IVPB, 1,000 mg, PRN  hydrALAZINE, 10 mg, Q6H PRN  ALPRAZolam, 0.25 mg, BID PRN  senna, 1 tablet, BID PRN  aluminum & magnesium hydroxide-simethicone, 30 mL, TID PRN  sodium chloride flush, 10 mL, PRN  sodium chloride, 25 mL, PRN  ondansetron, 4 mg, Q8H PRN   Or  ondansetron, 4 mg, Q6H PRN  magnesium hydroxide, 30 mL, Daily PRN  diphenhydrAMINE, 25 mg, Q6H PRN  zolpidem, 5 mg, Nightly PRN  HYDROcodone 5 mg - acetaminophen, 1 tablet, Q4H PRN            Subjective:     Patient seen and examined at bedside. Patient has persistent facial droop and weakness. Also complaining of lower quadrant abdominal pain. Afebrile      ROS:  A 10 point system reviewed and negative otherwise mentioned above. Physical Examination:      Vitals:  BP (!) 144/104   Pulse 81   Temp 97.9 °F (36.6 °C) (Temporal)   Resp 20   Ht 6' (1.829 m)   Wt 200 lb (90.7 kg)   SpO2 97%   BMI 27.12 kg/m²   Temp (24hrs), Av.3 °F (36.8 °C), Min:97.9 °F (36.6 °C), Max:98.9 °F (37.2 °C)    Weight:   Wt Readings from Last 3 Encounters:   10/07/21 200 lb (90.7 kg)     I/O last 3 completed shifts:  I/O last 3 completed shifts: In: 480 [P.O.:480]  Out: 700 [Urine:700]     No results for input(s): POCGLU in the last 72 hours.       General appearance - alert, well appearing, and in no acute distress  Mental status - oriented to person, place, and time with normal affect  Head - normocephalic and atraumatic  Eyes - pupils equal and reactive, extraocular eye movements intact, conjunctiva clear  Ears - hearing appears to be intact  Nose - no drainage noted  Mouth - mucous membranes moist  Neck - supple, no carotid bruits, thyroid not palpable  Chest - clear to auscultation, normal effort  Heart - normal rate, regular rhythm, no murmur  Abdomen - soft, nontender, nondistended, bowel sounds present all four quadrants, no masses, hepatomegaly or splenomegaly  Neurological - normal speech, no focal findings or movement disorder noted, cranial nerves II through XII grossly intact  Extremities - peripheral pulses palpable, no pedal edema or calf pain with palpation  Skin - no gross lesions, rashes, or induration noted        Medications:      Allergies: No Known Allergies    Current Meds:   Current Facility-Administered Medications:     [START ON 10/8/2021] albumin human 5 % IV solution 200 g, 200 g, IntraVENous, Once, Gita Flores MD    [START ON 10/8/2021] calcium gluconate 2,000 mg in sodium chloride 0.9 % 100 mL IVPB, 2,000 mg, IntraVENous, Once, Gita Flores MD    [facility] ON 10/8/2021] heparin (porcine) injection 15,000 Units, 15,000 Units, IntraCATHeter, Once, Gita Flores MD    ibuprofen (ADVIL;MOTRIN) tablet 800 mg, 800 mg, Oral, Q8H PRN, Melvina Rivers MD, 800 mg at 10/07/21 0701    acetaminophen (TYLENOL) tablet 650 mg, 650 mg, Oral, Q4H PRN, Elidia Ayala MD    calcium gluconate 1,000 mg in sodium chloride 0.9 % 100 mL IVPB, 1,000 mg, IntraVENous, PRN, Gita Flores MD    amLODIPine (NORVASC) tablet 10 mg, 10 mg, Oral, Daily, Albaro Spencer MD, 10 mg at 10/07/21 6630    hydrALAZINE (APRESOLINE) injection 10 mg, 10 mg, IntraVENous, Q6H PRN, Albaro Spencer MD, 10 mg at 10/05/21 0219    ALPRAZolam (XANAX) tablet 0.25 mg, 0.25 mg, Oral, BID PRN, Consuelo Bar MD, 0.25 mg at 10/07/21 0604    gabapentin (NEURONTIN) capsule 600 mg, 600 mg, Oral, Nightly, Julienne Bustlilo MD, 600 mg at 10/06/21 2033    gabapentin (NEURONTIN) capsule 300 mg, 300 mg, Oral, BID, Julienne Bustillo MD, 300 mg at 10/07/21 1346    senna (SENOKOT) tablet 8.6 mg, 1 tablet, Oral, BID PRN, Consuelo Bar MD, 8.6 mg at 10/07/21 0840    docusate sodium (COLACE) capsule 100 mg, 100 mg, Oral, Daily, Albaro Spencer MD, 100 mg at 10/07/21 0840    aluminum & magnesium hydroxide-simethicone (MAALOX) 200-200-20 MG/5ML suspension 30 mL, 30 mL, Oral, TID PRN, Albaro Spencer MD, 30 mL at 10/04/21 1736    famotidine (PEPCID) tablet 20 mg, 20 mg, Oral, BID, Albaro Spencer MD, 20 mg at 10/07/21 0840    sodium chloride flush 0.9 % injection 10 mL, 10 mL, IntraVENous, >60   CALCIUM 8.7  --  8.9 8.7   CAION  --   --  1.27  --    PHOS  --  3.6  --   --      Recent Labs     10/05/21  1732          No results found for: SPECIAL  No results found for: CULTURE    No results found for: POCPH, PHART, PH, POCPCO2, NCQ0TKX, PCO2, POCPO2, PO2ART, PO2, POCHCO3, VDP3ZTX, HCO3, NBEA, PBEA, BEART, BE, THGBART, THB, UDN2DVO, ROVB7JOZ, I1SJXGRA, O2SAT, FIO2    Radiology:    CT Head WO Contrast    Result Date: 9/29/2021  No acute intracranial abnormality. CT CERVICAL SPINE WO CONTRAST    Result Date: 9/29/2021  No acute abnormality of the cervical spine. CT THORACIC SPINE WO CONTRAST    Result Date: 9/29/2021  1. No large disc herniation or protrusion by CT. 2. Other findings as described. CT Lumbar Spine WO Contrast    Result Date: 9/29/2021  1. No acute fracture. No listhesis. 2. Other findings as described. IR LUMBAR PUNCTURE FOR DIAGNOSIS    Result Date: 10/4/2021  Successful fluoroscopic-guided lumbar puncture. All radiological studies reviewed  Code Status:  Full Code        Electronically signed by Amie Draper MD on 10/7/2021 at 4:12 PM    This note was created with the assistance of a speech-recognition program.  Although the intention is to generate a document that actually reflects the content of the visit, no guarantees can be provided that every mistake has been identified and corrected by editing. Note was updated later by me after  physical examination and  completion of the assessment.

## 2021-10-07 NOTE — PLAN OF CARE
Problem: Safety:  Goal: Free from accidental physical injury  Description: Free from accidental physical injury  10/7/2021 0359 by June Schroeder RN  Outcome: Ongoing     Patient has remained free from falls this shift. Patient is alert and oriented times four. Bed to lowest position with door open. Patient care items and call light in reach. Patient uses call light appropriately for assist. Will continue to monitor. Please see fall assessment.

## 2021-10-07 NOTE — PROGRESS NOTES
Occupational Therapy  Facility/Department: [facility]  Daily Treatment Note  NAME: Brie Chávez  : 1975  MRN: [id number]    Date of Service: 10/7/2021    Discharge Recommendations:  Patient would benefit from continued therapy after discharge       Assessment   Performance deficits / Impairments: Decreased functional mobility ; Decreased ADL status; Decreased strength;Decreased safe awareness;Decreased endurance;Decreased sensation;Decreased balance;Decreased coordination;Decreased fine motor control  Assessment: At this time, pt continues to be motiviated and eager to begin treatment. Pt. completed multiple ADL transfers with min assist with use of RW to insure safety. Pt. tolerated standing 3-4 min and displayed proper recall of safety awareness with use of RW and positioning of body without cues. Pt. sat in bedside chair to complete B UE resisitive theraband exercises to promote functional strength and mobility. Pt. completed all sets with success and increased reps of horizontal ab/adduction. Pt. continues to display limited functional mobility, decreased endurance and over all strength. Skilled OT is warranted to return pt to prior level of function and return home with assist as needed. Prognosis: Good  OT Education: OT Role;Plan of Care;Home Exercise Program;Precautions;Transfer Training;Energy Conservation  Patient Education: Pt. education on safety awareness and energy conservation/pacing tech. to maximize I/safety  REQUIRES OT FOLLOW UP: Yes  Activity Tolerance  Activity Tolerance: Patient Tolerated treatment well;Patient limited by fatigue  Activity Tolerance: Pt. limited by fatigue, however, displayed increased activity tolerance for UE exercise by increasing reps to 15 with maintaining O2 levels of 95-98% saturation. Safety Devices  Safety Devices in place: Yes  Type of devices:  All fall risk precautions in place;Gait belt;Left in chair;Nurse notified;Call light within reach         Patient Diagnosis(es): The primary encounter diagnosis was Paresthesia of both lower extremities. Diagnoses of Generalized weakness, Paresthesia of both hands, and Chronic bilateral low back pain with bilateral sciatica were also pertinent to this visit. has a past medical history of Herniation of intervertebral disc between L5 and S1, Low back pain, and Sciatica. has a past surgical history that includes hernia repair (Right) and IR NONTUNNELED VASCULAR CATHETER > 5 YEARS (10/5/2021). Restrictions  Restrictions/Precautions  Restrictions/Precautions: General Precautions, Fall Risk  Required Braces or Orthoses?: No  Position Activity Restriction  Other position/activity restrictions: Nontunneled plasmapharesis catheter, O2 NC  Subjective   General  Chart Reviewed: Yes  Patient assessed for rehabilitation services?: Yes  Additional Pertinent Hx: Pt. supine in bed upon arrival to room, pt agreeable to treatment. Response to previous treatment: Patient with no complaints from previous session  Family / Caregiver Present: No  General Comment  Comments: \"I am ready to workout! \"      Orientation  Orientation  Overall Orientation Status: Within Normal Limits  Objective    ADL  LE Dressing: Contact guard assistance (CGA to james socks from EOB)  Additional Comments: Pt. completed sock donning while sitting unsupported on EOB with CGA for safety. Balance  Sitting Balance: Supervision  Standing Balance: Minimal assistance  Standing Balance  Time: standing tolerance for functional transfer 3-4 min. Functional Mobility  Functional - Mobility Device: Rolling Walker  Functional Mobility Comments: Pt. contiues to display good motivation today. Pt. required occassional cues to pace self and allow body to rest from fatigue to insure safety.   Bed mobility  Rolling to Left: Independent  Rolling to Right: Independent  Supine to Sit: Independent  Sit to Supine: Independent  Scooting: Independent  Transfers  Sit to stand: Minimal assistance (with use of RW to insure safety)  Stand to sit: Minimal assistance (with use of RW to insure safety.)  Transfer Comments: Pt. completed 2 ADL functional transfers with good recall on proper  with use of RW. Pt. voiced \"strange tingling sensation in feet and legs\" upon inital stance and provided with min assist to insure safety. Pt. able to recall and voice proper tech of small/controled steps and displayed proper positioning of RW throughout transfer. Cognition  Overall Cognitive Status: WFL     Perception  Overall Perceptual Status: WFL        Type of ROM/Therapeutic Exercise  Type of ROM/Therapeutic Exercise: AROM  Comment: Pt. sat up in bedside chair to complete 4 sets of resistive theraband exercise in sets of 15 reps each. Exercises  Shoulder Flexion: x15  Shoulder Extension: x15  Horizontal ABduction: x15  Horizontal ADduction: x15  Elbow Flexion: x15  Elbow Extension: x15  Other: Pt. completed sets of exercise with blue resistive band with good recall on proper tech for slow controled movements. Plan   Plan  Times per week: 4-5x/week, 1-2x/day  Times per day: Daily  Current Treatment Recommendations: Strengthening, Balance Training, Functional Mobility Training, Endurance Training, Safety Education & Training, Self-Care / ADL, Patient/Caregiver Education & Training, Equipment Evaluation, Education, & procurement, Positioning  Plan Comment: Cont with same POC achieve stated goals.                                                  AM-PAC Score        AM-[facility] Inpatient Daily Activity Raw Score: 16 (10/07/21 1101)  AM-PAC Inpatient ADL T-Scale Score : 35.96 (10/07/21 1101)  ADL Inpatient CMS 0-100% Score: 53.32 (10/07/21 1101)  ADL Inpatient CMS G-Code Modifier : CK (10/07/21 1101)    Goals  Short term goals  Time Frame for Short term goals: by discharge, pt will  Short term goal 1: demo CGA with ADL transfers with approp AD/DME and good safety  Short term goal 2: demo and verb good understanding of ed provided on fall prevention techs, equip needs, d/c recommendations, and EC/WS techs  Short term goal 3: demo CGA with toileting routine at approp level with good safety, DME as needed  Short term goal 4: demo SBA with UB ADls and min A LB ADLs with DME as needed and good safety/pacing  Short term goal 5: demo I/S with B UE HEP for inc strength in all muscle groups for inc. functional strength to complete ADLs and functional transfers  Long term goals  Long term goal 1: demo CGA with functional mob in room distances with good safety/pacing for ADL completion with approp AD  Patient Goals   Patient goals : to return home, full function       Therapy Time   Individual Concurrent Group Co-treatment   Time In 1002         Time Out 1040         Minutes 45              RN reports patient is medically stable for therapy treatment this date. Chart reviewed prior to treatment and patient is agreeable for therapy. All lines intact and patient positioned comfortably at end of treatment. All patient needs addressed prior to ending therapy session.       GASTON Singh

## 2021-10-07 NOTE — CARE COORDINATION
Patient just started plasmapheresis yesterday. Will need a total of 5 treatments every other day. [facility] is following. Continue to follow.

## 2021-10-07 NOTE — PROGRESS NOTES
Physical Therapy  Facility/Department: [facility] [place] [facility]  Daily Treatment Note  NAME: Niall Michaels  : 1975  MRN: [id number]    Date of Service: 10/7/2021    Discharge Recommendations:  Patient would benefit from continued therapy after discharge      Pt is currently functional below baseline and would suggest intense therapy post acute care. Would expect patient to be able to tolerate 3 hours of therapy per day and able to tolerate at least one hour up in chair. Please refer to AM-PAC score for current mobility/adl level. Assessment   Body structures, Functions, Activity limitations: Decreased functional mobility ; Decreased strength;Decreased endurance;Decreased balance;Decreased safe awareness;Decreased sensation  Assessment: Patient with global weakness and decreased steadiness with increased activity. Patient making progress toward short term goals. Will continue to progress to patient tolerance. Prognosis: Excellent  Decision Making: Low Complexity  PT Education: Energy Conservation;General Safety;Home Exercise Program  REQUIRES PT FOLLOW UP: Yes  Activity Tolerance  Activity Tolerance: Patient limited by fatigue     Patient Diagnosis(es): The primary encounter diagnosis was Paresthesia of both lower extremities. Diagnoses of Generalized weakness, Paresthesia of both hands, and Chronic bilateral low back pain with bilateral sciatica were also pertinent to this visit. has a past medical history of Herniation of intervertebral disc between L5 and S1, Low back pain, and Sciatica. has a past surgical history that includes hernia repair (Right) and IR NONTUNNELED VASCULAR CATHETER > 5 YEARS (10/5/2021).     Restrictions  Restrictions/Precautions  Restrictions/Precautions: General Precautions, Fall Risk  Required Braces or Orthoses?: No  Position Activity Restriction  Other position/activity restrictions: Nontunneled plasmapharesis catheter, O2 NC  Subjective   General  Chart Reviewed: Yes  Response To Previous Treatment: Patient with no complaints from previous session. Family / Caregiver Present: Yes (Mother and Father)  Subjective  Subjective: Pt reporting fatigue this date, but motivated to work w/ therapy. General Comment  Comments: RN Yue Valle and pt agreeable to therapy. Pain Screening  Patient Currently in Pain: Yes  Vital Signs  Patient Currently in Pain: Yes       Orientation  Orientation  Overall Orientation Status: Within Normal Limits  Cognition      Objective   Bed mobility  Rolling to Left: Independent  Rolling to Right: Independent  Supine to Sit: Independent  Sit to Supine: Independent  Scooting: Independent  Comment: Patient with proper bed mobility and hand placement techniques to promote to EOB seated posture. Transfers  Sit to Stand: Contact guard assistance  Stand to sit: Contact guard assistance  Bed to Chair: Contact guard assistance  Lateral Transfers: Contact guard assistance  Comment: Patient with good sit to stand technique with proper UB use to promote to standing EOB. Patient educated on the importance of OOB activities to maximize tolerance to activities and prevent secondary complications. Ambulation  Ambulation?: Yes  Ambulation 1  Surface: level tile  Device: Rolling Walker  Assistance: Contact guard assistance  Quality of Gait: slow, shaky, increased effort  Gait Deviations: Slow Krystyna;Staggers;Decreased step length;Decreased step height; Increased JUSTIN  Distance: 100 feet x1  Comments: Patient requires vc's for progression and sequencing with assistive device. Patient with decreased steadiness with increased distance this date. Patient with good understanding of gait break down and good return on demo (heel strike, flat foot, toe off.)  Patient worked on technique throughout ambulation this visit. Patient with wide turns. Neuromuscular Education  NDT Treatment: Gait ;Lower extremity; Sitting;Standing  Balance  Posture: Good  Sitting - Static: Good  Sitting - Dynamic: Good;-  Standing - Static: Fair;+  Standing - Dynamic: Fair;-  Comments: Standing balance assessed w/ RW  Exercises  Quad Sets: 10 x1 SHARRON  Gluteal Sets: 10 x1 SHARRON  Hip Flexion: Seated marches x 10 BLE  Knee Long Arc Quad: x 10 BLE  Ankle Pumps: seated x 20 BLE  Comments: Pt w/ fair demo of proper technique and pacing throughout ther ex. Pt fatiguing quickly, requiring short rest breaks throughout. AM-PAC Score  AM-PAC Inpatient Mobility Raw Score : 17 (10/07/21 1602)  AM-PAC Inpatient T-Scale Score : 42.13 (10/07/21 1602)  Mobility Inpatient CMS 0-100% Score: 50.57 (10/07/21 1602)  Mobility Inpatient CMS G-Code Modifier : CK (10/07/21 1602)          Goals  Short term goals  Time Frame for Short term goals: 12 treatments  Short term goal 1: Independent bed mobility/transfers  Short term goal 2: Independent ambulation 200' x 1  Short term goal 3: Independently ascend/descend 1 flight of steps  Short term goal 4: Good standing balance  Short term goal 5:  Tolerate 30 min ther act/ 5/5 B LE's  Patient Goals   Patient goals : Get strength back    Plan    Plan  Times per week: 1-2x/day,5-6 days/week  Current Treatment Recommendations: Strengthening, Balance Training, Endurance Training, Transfer Training, Gait Training, Functional Mobility Training, Stair training, Neuromuscular Re-education  Safety Devices  Type of devices: Call light within reach, Gait belt, Nurse notified, Left in bed  Restraints  Initially in place: No     Therapy Time   Individual Concurrent Group Co-treatment   Time In 1450         Time Out 1520         Minutes [address] [place] [address]

## 2021-10-07 NOTE — PLAN OF CARE
Problem: Falls - Risk of:  Goal: Will remain free from falls  Description: Will remain free from falls  Outcome: Ongoing     Problem: Falls - Risk of:  Goal: Absence of physical injury  Description: Absence of physical injury  Outcome: Ongoing     Problem: Infection:  Goal: Will remain free from infection  Description: Will remain free from infection  Outcome: Ongoing     Problem: Safety:  Goal: Free from accidental physical injury  Description: Free from accidental physical injury  Outcome: Ongoing     Problem: Safety:  Goal: Free from intentional harm  Description: Free from intentional harm  Outcome: Ongoing     Problem: Safety:  Goal: Ability to chew and swallow food without choking will improve  Description: Ability to chew and swallow food without choking will improve  Outcome: Ongoing     Problem: Pain:  Goal: Patient's pain/discomfort is manageable  Description: Patient's pain/discomfort is manageable  Outcome: Ongoing     Problem: Pain:  Goal: Pain level will decrease  Description: Pain level will decrease  Outcome: Ongoing     Problem: Pain:  Goal: Control of acute pain  Description: Control of acute pain  Outcome: Ongoing     Problem: Pain:  Goal: Control of chronic pain  Description: Control of chronic pain  Outcome: Ongoing     Problem: Respiratory:  Goal: Will remain free from infection  Description: Will remain free from infection  Outcome: Ongoing     Problem: Skin Integrity:  Goal: Will show no infection signs and symptoms  Description: Will show no infection signs and symptoms  Outcome: Ongoing     Problem: Skin Integrity:  Goal: Absence of new skin breakdown  Description: Absence of new skin breakdown  Outcome: Ongoing

## 2021-10-07 NOTE — PROGRESS NOTES
Physical therapy in to see pt and worked with pt, they recommend speech therapy, speech therapy is already consulted, message left for them to see pt tomorrow

## 2021-10-07 NOTE — PROGRESS NOTES
Nutrition Assessment     Type and Reason for Visit: BHARGAV Nutrition Re-Screen/LOS (Length of stay)    Nutrition Recommendations/Plan:   Continue Regular diet     Nutrition Assessment:  Patient assessed for length of stay. Patient reports his appetite is improved and he is eating well at  meals. Lunch intake was %. Patient report his bowels are now moving. Patient is at low nutrition risk. Monitor p.o intakes and labs. Malnutrition Assessment:  Malnutrition Status: No malnutrition        Current Nutrition Therapies:    ADULT DIET;  Regular    Anthropometric Measures:  · Height: 6' (182.9 cm)  · Current Body Wt: 200 lb (90.7 kg)   · BMI: 27.1    Nutrition Diagnosis:   No nutrition diagnosis at this time     Nutrition Interventions:   Food and/or Nutrient Delivery:  Continue Current Diet  Nutrition Education/Counseling:  Education not indicated   Coordination of Nutrition Care:  Continue to monitor while inpatient    Nutrition Monitoring and Evaluation:   Food/Nutrient Intake Outcomes:  Food and Nutrient Intake  Physical Signs/Symptoms Outcomes:  Biochemical Data     Discharge Planning:    Continue current diet       Orvis Wainwright  MFN, RDN, LDN  Lead Clinical Dietitian  RD Office Phone [phone]

## 2021-10-07 NOTE — PROGRESS NOTES
Active problem [place] syndrome having received IgG with further decline to proceed with plasma exchange. Low back pain . The condition is he is complaining of painful leg paresthesias last night on neurontin 300-300-600 . He is do for second out of fifth plasma exchange tomorrow . He has bilateral left facial weakness with only minor left lower facial contraction none on on right  Partial ability to close both eyes . Mild left facial contraction . None on right . Deltoids 4/5 , biceps 4+/5 . Iliopsas and hamstrings , ADF 4+/5 . He walked 100 feet with rolling walker with contact guard assist.   Low back pain is partially attenuated with motrin 800 mg po tid . 50 yo male with [place] undergoing IV IgG today being day 5 out of 5 treatments 0.4 gram/kg qd x 5 days . He presents with 4 days of weakness of legs and leg numbness along with arm weakness and right facial weakness that developed in hospital. Head CT normal . CT cervical and thoracic spine normal . CT lumbar sine with left L5-S1 disc protrusion . CSF analysis today 4 WBC , 8 RBC , total protein 273 and glucose 60. He has slow gait with rolling walker with moderate assist walking short distance . He is complaining of low back pain with history of disc herniation . Significant medications Neurontin 300 mg po bid and 600 mg po qhs , motrin 800mg po tid. Testing  Head CT normal . CT cervical and thoracic spine normal . CT lumbar spine with left L5-S1 disc protrusion . CSF analysis today 4 WBC , 8 RBC , total protein 273 and glucose 60 . IgG 0.4 gram/kg qd x 5 days.  FU Head CT normal      Past Medical History:   Diagnosis Date    Herniation of intervertebral disc between L5 and S1     Low back pain     Sciatica        Past Surgical History:   Procedure Laterality Date    HERNIA REPAIR Right     inguinal    IR NONTUNNELED VASCULAR CATHETER  10/5/2021    IR NONTUNNELED VASCULAR CATHETER 10/5/2021 MD MARGARITO Ortiz [facility] History reviewed. No pertinent family history. Social History     Socioeconomic History    Marital status:      Spouse name: None    Number of children: None    Years of education: None    Highest education level: None   Occupational History    None   Tobacco Use    Smoking status: Former Smoker     Quit date: 2014     Years since quittin.0    Smokeless tobacco: Never Used   Substance and Sexual Activity    Alcohol use: Not Currently    Drug use: Not Currently    Sexual activity: None   Other Topics Concern    None   Social History Narrative    None     Social Determinants of Health     Financial Resource Strain:     Difficulty of Paying Living Expenses:    Food Insecurity:     Worried About Running Out of Food in the Last Year:     Ran Out of Food in the Last Year:    Transportation Needs:     Lack of Transportation (Medical):      Lack of Transportation (Non-Medical):    Physical Activity:     Days of Exercise per Week:     Minutes of Exercise per Session:    Stress:     Feeling of Stress :    Social Connections:     Frequency of Communication with Friends and Family:     Frequency of Social Gatherings with Friends and Family:     Attends Christian Services:     Active Member of Clubs or Organizations:     Attends Club or Organization Meetings:     Marital Status:    Intimate Partner Violence:     Fear of Current or Ex-Partner:     Emotionally Abused:     Physically Abused:     Sexually Abused:        Current Facility-Administered Medications   Medication Dose Route Frequency Provider Last Rate Last Admin    [START ON 10/8/2021] albumin human 5 % IV solution 200 g  200 g IntraVENous Once MD Amanda Hutchins [place] ON 10/8/2021] calcium gluconate 2,000 mg in sodium chloride 0.9 % 100 mL IVPB  2,000 mg IntraVENous Once Cory Webster MD        [START ON 10/8/2021] heparin (porcine) injection 15,000 Units  15,000 Units IntraCATHeter Once Isa Ang MD        ibuprofen (ADVIL;MOTRIN) tablet 800 mg  800 mg Oral Q8H PRN Tadeo Aviles MD   800 mg at 10/07/21 0701    acetaminophen (TYLENOL) tablet 650 mg  650 mg Oral Q4H PRN Al Lai MD        calcium gluconate 1,000 mg in sodium chloride 0.9 % 100 mL IVPB  1,000 mg IntraVENous PRN Isa Ang MD        amLODIPine (NORVASC) tablet 10 mg  10 mg Oral Daily Albaro Spencer MD   10 mg at 10/07/21 0025    hydrALAZINE (APRESOLINE) injection 10 mg  10 mg IntraVENous Q6H PRN Albaro Spencer MD   10 mg at 10/05/21 0219    ALPRAZolam (XANAX) tablet 0.25 mg  0.25 mg Oral BID PRN Albaro Spencer MD   0.25 mg at 10/07/21 0604    gabapentin (NEURONTIN) capsule 600 mg  600 mg Oral Nightly Aliabrown Metcalf MD   600 mg at 10/06/21 2033    gabapentin (NEURONTIN) capsule 300 mg  300 mg Oral BID Aliabrown Metcalf MD   300 mg at 10/07/21 1346    senna (SENOKOT) tablet 8.6 mg  1 tablet Oral BID PRN Albaro Spencer MD   8.6 mg at 10/07/21 0840    docusate sodium (COLACE) capsule 100 mg  100 mg Oral Daily Albaro Spencer MD   100 mg at 10/07/21 0840    aluminum & magnesium hydroxide-simethicone (MAALOX) 200-200-20 MG/5ML suspension 30 mL  30 mL Oral TID PRN Albaro Spencer MD   30 mL at 10/04/21 1736    famotidine (PEPCID) tablet 20 mg  20 mg Oral BID Serene Pizano MD   20 mg at 10/07/21 0840    sodium chloride flush 0.9 % injection 10 mL  10 mL IntraVENous 2 times per day Serene Pizano MD   10 mL at 10/07/21 0847    sodium chloride flush 0.9 % injection 10 mL  10 mL IntraVENous PRN Albaro Spencer MD        0.9 % sodium chloride infusion  25 mL IntraVENous PRN Albaro Spencer  mL/hr at 10/02/21 1339 25 mL at 10/02/21 1339    ondansetron (ZOFRAN-ODT) disintegrating tablet 4 mg  4 mg Oral Q8H PRN Albaro Spencer MD   4 mg at 09/30/21 1514    Or    ondansetron (ZOFRAN) injection 4 mg  4 mg IntraVENous Q6H PRN Albaro Spencer MD   4 mg at 10/03/21 0433    magnesium hydroxide (MILK OF MAGNESIA) 400 MG/5ML suspension 30 mL  30 mL Oral Daily PRN Albaro Spencer MD   30 mL at 10/06/21 0537    enoxaparin (LOVENOX) injection 40 mg  40 mg SubCUTAneous Daily Albaro Spencer MD   40 mg at 10/07/21 0846    diphenhydrAMINE (BENADRYL) tablet 25 mg  25 mg Oral Q6H PRN Julienne Bustillo MD        zolpidem [facility] [place]) tablet 5 mg  5 mg Oral Nightly PRN Julienne Bustillo MD   5 mg at 10/06/21 2032    HYDROcodone-acetaminophen (Burns Balsam) 5-325 MG per tablet 1 tablet  1 tablet Oral Q4H PRN Consuelo Bar MD   1 tablet at 10/07/21 1021       No Known Allergies    ROS:   Constitutional                  Negative for fever and chills   HEENT                            Negative for ear discharge, ear pain, nosebleed  Eyes                                Negative for photophobia, pain and discharge  Respiratory                      Negative for hemoptysis and sputum  Cardiovascular                Negative for orthopnea, claudication and PND  Gastrointestinal               Negative for abdominal pain, diarrhea, blood in stool  Musculoskeletal               Positive low back pain   Skin                                 Negative for rash or itching  Endo/heme/allergies       Negative for polydipsia, environmental allergy  Psychiatric                       Negative for suicidal ideation. Patient is not anxious    Vitals:    10/07/21 1200   BP: (!) 144/104   Pulse:    Resp: 20   Temp: 97.9 °F (36.6 °C)   SpO2:      Admission weight: 200 lb (90.7 kg)    Neurological Examination  Constitutional .General exam well groomed   Head/ Ears /Nose/Throat/external ear . Normal exam  Neck and thyroid . Normal size. No bruits  Respiratory . Breathsounds clear bilaterally  Cardiovascular:  Auscultation of heart with regular rate and rhythm   Musculoskeletal. Muscle bulk and tone normal                                                           Muscle strength deltoids 4/5 , biceps 4+/5 . liopsoas , hamstrings and ADF 5-/5 otherwise 5/5 strength throughout                                                                                No dysmetria or dysdiadokinesis  No tremor   Normal fine motor  Orientation Alert and oriented x 3   Attention and concentration normal  Short term memory normal  Language process and speech normal . No aphasia   Cranial nerve 2 normal acuety and visual fields  Cranial nerve 3, 4 and 6 . Extraocular muscles are intact . Pupils are equal and reactive   Cranial nerve 5 . Normal strength of masseter and temporalis . Intact corneal reflex. Normal facial sensation  Cranial nerve 7 minor left lower facial contraction none on on right  Partial ability to close both eyes . Mild left lower facial contraction . None on right lower facial weakness . Decrease resistance right eye closure . Decrease right frontalis contraction . None left   Cranial nerve 8. Grossly intact hearing   Cranial nerve 9 and 10. Symmetric palate elevation   Cranial nerve 11 , 5 out of 5 strength   Cranial Nerve 12 midline tongue . 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 Low back pain     Plan:    Tomorrow day 2 of 5 plasma exchange.  Readjust neurontin 600 mg po tid

## 2021-10-08 LAB
ABSOLUTE EOS #: 0.04 K/UL (ref 0–0.44)
ABSOLUTE IMMATURE GRANULOCYTE: 0.01 K/UL (ref 0–0.3)
ABSOLUTE LYMPH #: 1.56 K/UL (ref 1.1–3.7)
ABSOLUTE MONO #: 0.64 K/UL (ref 0.1–1.2)
ANION GAP SERPL CALCULATED.3IONS-SCNC: 10 MMOL/L (ref 9–17)
BASOPHILS # BLD: 1 % (ref 0–2)
BASOPHILS ABSOLUTE: 0.03 K/UL (ref 0–0.2)
BUN BLDV-MCNC: 15 MG/DL (ref 6–20)
BUN/CREAT BLD: 15 (ref 9–20)
CALCIUM SERPL-MCNC: 8.6 MG/DL (ref 8.6–10.4)
CHLORIDE BLD-SCNC: 106 MMOL/L (ref 98–107)
CO2: 21 MMOL/L (ref 20–31)
CREAT SERPL-MCNC: 0.97 MG/DL (ref 0.7–1.2)
DIFFERENTIAL TYPE: NORMAL
EOSINOPHILS RELATIVE PERCENT: 1 % (ref 1–4)
FIBRINOGEN: 231 MG/DL (ref 179–518)
GFR AFRICAN AMERICAN: >60 ML/MIN
GFR NON-AFRICAN AMERICAN: >60 ML/MIN
GFR SERPL CREATININE-BSD FRML MDRD: NORMAL ML/MIN/{1.73_M2}
GFR SERPL CREATININE-BSD FRML MDRD: NORMAL ML/MIN/{1.73_M2}
GLUCOSE BLD-MCNC: 95 MG/DL (ref 70–99)
HCT VFR BLD CALC: 42.8 % (ref 40.7–50.3)
HEMOGLOBIN: 14 G/DL (ref 13–17)
IMMATURE GRANULOCYTES: 0 %
LYMPHOCYTES # BLD: 28 % (ref 24–43)
MCH RBC QN AUTO: 27.2 PG (ref 25.2–33.5)
MCHC RBC AUTO-ENTMCNC: 32.7 G/DL (ref 28.4–34.8)
MCV RBC AUTO: 83.3 FL (ref 82.6–102.9)
MONOCYTES # BLD: 11 % (ref 3–12)
NRBC AUTOMATED: 0 PER 100 WBC
PDW BLD-RTO: 12.7 % (ref 11.8–14.4)
PLATELET # BLD: 188 K/UL (ref 138–453)
PLATELET ESTIMATE: NORMAL
PMV BLD AUTO: 10 FL (ref 8.1–13.5)
POC IONIZED CALCIUM: 1.18 MMOL/L (ref 1.15–1.33)
POTASSIUM SERPL-SCNC: 4.3 MMOL/L (ref 3.7–5.3)
RBC # BLD: 5.14 M/UL (ref 4.21–5.77)
RBC # BLD: NORMAL 10*6/UL
SEG NEUTROPHILS: 59 % (ref 36–65)
SEGMENTED NEUTROPHILS ABSOLUTE COUNT: 3.4 K/UL (ref 1.5–8.1)
SODIUM BLD-SCNC: 137 MMOL/L (ref 135–144)
WBC # BLD: 5.7 K/UL (ref 3.5–11.3)
WBC # BLD: NORMAL 10*3/UL

## 2021-10-08 PROCEDURE — 36415 COLL VENOUS BLD VENIPUNCTURE: CPT

## 2021-10-08 PROCEDURE — 97530 THERAPEUTIC ACTIVITIES: CPT

## 2021-10-08 PROCEDURE — 97116 GAIT TRAINING THERAPY: CPT

## 2021-10-08 PROCEDURE — 6370000000 HC RX 637 (ALT 250 FOR IP): Performed by: PSYCHIATRY & NEUROLOGY

## 2021-10-08 PROCEDURE — 6370000000 HC RX 637 (ALT 250 FOR IP): Performed by: HOSPITALIST

## 2021-10-08 PROCEDURE — 99233 SBSQ HOSP IP/OBS HIGH 50: CPT | Performed by: PSYCHIATRY & NEUROLOGY

## 2021-10-08 PROCEDURE — 85384 FIBRINOGEN ACTIVITY: CPT

## 2021-10-08 PROCEDURE — 6370000000 HC RX 637 (ALT 250 FOR IP): Performed by: FAMILY MEDICINE

## 2021-10-08 PROCEDURE — 85025 COMPLETE CBC W/AUTO DIFF WBC: CPT

## 2021-10-08 PROCEDURE — 82330 ASSAY OF CALCIUM: CPT

## 2021-10-08 PROCEDURE — 2060000000 HC ICU INTERMEDIATE R&B

## 2021-10-08 PROCEDURE — 6360000002 HC RX W HCPCS: Performed by: FAMILY MEDICINE

## 2021-10-08 PROCEDURE — 36516 APHERESIS IMMUNOADS SLCTV: CPT

## 2021-10-08 PROCEDURE — 97110 THERAPEUTIC EXERCISES: CPT

## 2021-10-08 PROCEDURE — 2580000003 HC RX 258: Performed by: FAMILY MEDICINE

## 2021-10-08 PROCEDURE — 97112 NEUROMUSCULAR REEDUCATION: CPT

## 2021-10-08 PROCEDURE — 80048 BASIC METABOLIC PNL TOTAL CA: CPT

## 2021-10-08 RX ORDER — PANTOPRAZOLE SODIUM 40 MG/10ML
40 INJECTION, POWDER, LYOPHILIZED, FOR SOLUTION INTRAVENOUS DAILY
Status: DISCONTINUED | OUTPATIENT
Start: 2021-10-08 | End: 2021-10-08

## 2021-10-08 RX ADMIN — SENNOSIDES 8.6 MG: 8.6 TABLET, COATED ORAL at 22:23

## 2021-10-08 RX ADMIN — MAGNESIUM HYDROXIDE 30 ML: 2400 SUSPENSION ORAL at 11:02

## 2021-10-08 RX ADMIN — IBUPROFEN 800 MG: 800 TABLET, FILM COATED ORAL at 22:23

## 2021-10-08 RX ADMIN — GABAPENTIN 600 MG: 300 CAPSULE ORAL at 08:47

## 2021-10-08 RX ADMIN — LIDOCAINE HYDROCHLORIDE: 20 SOLUTION ORAL; TOPICAL at 15:07

## 2021-10-08 RX ADMIN — IBUPROFEN 800 MG: 800 TABLET, FILM COATED ORAL at 11:43

## 2021-10-08 RX ADMIN — ALPRAZOLAM 0.25 MG: 0.25 TABLET ORAL at 02:02

## 2021-10-08 RX ADMIN — ZOLPIDEM TARTRATE 5 MG: 5 TABLET ORAL at 22:23

## 2021-10-08 RX ADMIN — ENOXAPARIN SODIUM 40 MG: 40 INJECTION SUBCUTANEOUS at 08:48

## 2021-10-08 RX ADMIN — GABAPENTIN 600 MG: 300 CAPSULE ORAL at 20:22

## 2021-10-08 RX ADMIN — IBUPROFEN 800 MG: 800 TABLET, FILM COATED ORAL at 02:02

## 2021-10-08 RX ADMIN — AMLODIPINE BESYLATE 10 MG: 10 TABLET ORAL at 08:47

## 2021-10-08 RX ADMIN — SODIUM CHLORIDE, PRESERVATIVE FREE 10 ML: 5 INJECTION INTRAVENOUS at 20:23

## 2021-10-08 RX ADMIN — GABAPENTIN 600 MG: 300 CAPSULE ORAL at 15:10

## 2021-10-08 RX ADMIN — DOCUSATE SODIUM 100 MG: 100 CAPSULE, LIQUID FILLED ORAL at 08:47

## 2021-10-08 RX ADMIN — FAMOTIDINE 20 MG: 20 TABLET, FILM COATED ORAL at 20:22

## 2021-10-08 RX ADMIN — FAMOTIDINE 20 MG: 20 TABLET, FILM COATED ORAL at 07:00

## 2021-10-08 RX ADMIN — HYDROCODONE BITARTRATE AND ACETAMINOPHEN 1 TABLET: 5; 325 TABLET ORAL at 06:02

## 2021-10-08 ASSESSMENT — PAIN SCALES - GENERAL
PAINLEVEL_OUTOF10: 0
PAINLEVEL_OUTOF10: 5
PAINLEVEL_OUTOF10: 0
PAINLEVEL_OUTOF10: 5
PAINLEVEL_OUTOF10: 0
PAINLEVEL_OUTOF10: 5
PAINLEVEL_OUTOF10: 6
PAINLEVEL_OUTOF10: 6
PAINLEVEL_OUTOF10: 7
PAINLEVEL_OUTOF10: 6
PAINLEVEL_OUTOF10: 0
PAINLEVEL_OUTOF10: 0
PAINLEVEL_OUTOF10: 7
PAINLEVEL_OUTOF10: 7

## 2021-10-08 ASSESSMENT — PAIN DESCRIPTION - PAIN TYPE
TYPE: ACUTE PAIN
TYPE: CHRONIC PAIN
TYPE: ACUTE PAIN

## 2021-10-08 ASSESSMENT — PAIN DESCRIPTION - DESCRIPTORS
DESCRIPTORS: SHARP
DESCRIPTORS: ACHING;CONSTANT

## 2021-10-08 ASSESSMENT — PAIN DESCRIPTION - ORIENTATION
ORIENTATION: LOWER
ORIENTATION: MID;UPPER

## 2021-10-08 ASSESSMENT — PAIN DESCRIPTION - LOCATION
LOCATION: ABDOMEN
LOCATION: BACK
LOCATION: ABDOMEN

## 2021-10-08 ASSESSMENT — PAIN DESCRIPTION - FREQUENCY: FREQUENCY: INTERMITTENT

## 2021-10-08 ASSESSMENT — PAIN DESCRIPTION - DIRECTION: RADIATING_TOWARDS: DOWN LEGS

## 2021-10-08 NOTE — PROGRESS NOTES
Occupational Therapy  Facility/Department: [facility]  Daily Treatment Note  NAME: Bernice Fry  : 1975  MRN: [id number]    Date of Service: 10/8/2021    Discharge Recommendations:  Patient would benefit from continued therapy after discharge       Assessment   Performance deficits / Impairments: Decreased functional mobility ; Decreased ADL status; Decreased strength;Decreased safe awareness;Decreased endurance;Decreased sensation;Decreased balance;Decreased coordination;Decreased fine motor control  Assessment: Pt. completed BUE exercise program with blue resistive band maintaining healthy O2 levels throughout task. Pt. increased reps and # of exerices tolerated today. Pt. continues to make gains towards goals, however, is limited in functional mobility, endurance and overall functional strength. Skilled OT is warranted to return pt to prior level of function and return home with assist as needed. Prognosis: Good  OT Education: OT Role;Plan of Care;Home Exercise Program;Precautions;Transfer Training;Energy Conservation  Patient Education: Pt. education on safety awareness and energy conservation/pacing tech. to maximize I/safety  REQUIRES OT FOLLOW UP: Yes  Activity Tolerance  Activity Tolerance: Patient Tolerated treatment well;Patient limited by fatigue  Activity Tolerance: Pt. limited by fatigue, however, displayed increased activity tolerance for UE exercise by increasing reps to 20 with maintaining O2 levels of 94-97% saturation. Safety Devices  Type of devices: All fall risk precautions in place;Gait belt;Left in chair;Nurse notified;Call light within reach         Patient Diagnosis(es): The primary encounter diagnosis was Paresthesia of both lower extremities. Diagnoses of Generalized weakness, Paresthesia of both hands, and Chronic bilateral low back pain with bilateral sciatica were also pertinent to this visit.       has a past medical history of Herniation of intervertebral disc between L5 and S1, Low back pain, and Sciatica. has a past surgical history that includes hernia repair (Right) and IR NONTUNNELED VASCULAR CATHETER > 5 YEARS (10/5/2021). Restrictions  Restrictions/Precautions  Restrictions/Precautions: General Precautions, Fall Risk  Required Braces or Orthoses?: No  Position Activity Restriction  Other position/activity restrictions: Nontunneled plasmapharesis catheter, O2 NC  Subjective   General  Chart Reviewed: Yes  Patient assessed for rehabilitation services?: Yes  Additional Pertinent Hx: Pt. up in bedside chair and agreeable for treatment. Response to previous treatment: Patient with no complaints from previous session  Family / Caregiver Present: No  General Comment  Comments: \"I am so thankful for you this week, I really feel like I am getting stronger\"      Orientation  Orientation  Overall Orientation Status: Within Normal Limits  Objective     Balance  Sitting Balance: Supervision         Cognition  Overall Cognitive Status: WNL     Perception  Overall Perceptual Status: WFL              Type of ROM/Therapeutic Exercise  Type of ROM/Therapeutic Exercise: AROM  Comment: Pt. sat up in bedside chair to complete 5 sets of resistive theraband exercise in sets of 20  Exercises  Scapular Protraction: x20  Scapular Retraction: x20  Shoulder Flexion: x20  Shoulder Extension: x20  Horizontal ABduction: x20  Horizontal ADduction: x20  Elbow Flexion: x20  Elbow Extension: x20  Other: Pt. completed sets of exercise with blue resistive band with good recall on proper tech for slow controled movements.          Plan   Plan  Times per week: 4-5x/week, 1-2x/day  Times per day: Daily  Current Treatment Recommendations: Strengthening, Balance Training, Functional Mobility Training, Endurance Training, Safety Education & Training, Self-Care / ADL, Patient/Caregiver Education & Training, Equipment Evaluation, Education, & procurement, Positioning  Plan Comment: Cont with same POC achieve stated goals.             AM-PAC Score        AM-PAC Inpatient Daily Activity Raw Score: 16 (10/08/21 1635)  AM-PAC Inpatient ADL T-Scale Score : 35.96 (10/08/21 1635)  ADL Inpatient CMS 0-100% Score: 53.32 (10/08/21 1635)  ADL Inpatient CMS G-Code Modifier : CK (10/08/21 1635)    Goals  Short term goals  Time Frame for Short term goals: by discharge, pt will  Short term goal 1: demo CGA with ADL transfers with approp AD/DME and good safety  Short term goal 2: demo and verb good understanding of ed provided on fall prevention techs, equip needs, d/c recommendations, and EC/WS techs  Short term goal 3: demo CGA with toileting routine at approp level with good safety, DME as needed  Short term goal 4: demo SBA with UB ADls and min A LB ADLs with DME as needed and good safety/pacing  Short term goal 5: demo I/S with B UE HEP for inc strength in all muscle groups for inc. functional strength to complete ADLs and functional transfers  Long term goals  Long term goal 1: demo CGA with functional mob in room distances with good safety/pacing for ADL completion with approp AD  Patient Goals   Patient goals : to return home, full function       Therapy Time   Individual Concurrent Group Co-treatment   Time In 0315         Time Out 0344         Minutes 34              RN reports patient is medically stable for therapy treatment this date. Chart reviewed prior to treatment and patient is agreeable for therapy. All lines intact and patient positioned comfortably at end of treatment. All patient needs addressed prior to ending therapy session.       GASTON Wolf

## 2021-10-08 NOTE — PROGRESS NOTES
Physical Therapy  Facility/Department: [facility]  Daily Treatment Note  NAME: Anival Quezada  : 1975  MRN: [id number]    Date of Service: 10/8/2021    Discharge Recommendations:  Patient would benefit from continued therapy after discharge     Pt is currently functional below baseline and would suggest intense therapy post acute care. Would expect patient to be able to tolerate 3 hours of therapy per day and able to tolerate at least one hour up in chair. Please refer to AM-PAC score for current mobility/adl level. Assessment   Body structures, Functions, Activity limitations: Decreased functional mobility ; Decreased strength;Decreased endurance;Decreased balance;Decreased safe awareness;Decreased sensation  Assessment: Increased activity this date, increased gait distance and exercise, patient tolerated well but did have increased fatigue following. Continued therapy needed for further strengthening, balance, and endurance. Recommend further therapy following acute stay. Prognosis: Excellent  PT Education: Disease Specific Education;General Safety;Gait Training;Home Exercise Program  Patient Education: desensitization techniques, facial exercises  REQUIRES PT FOLLOW UP: Yes  Activity Tolerance  Activity Tolerance: Patient Tolerated treatment well     Patient Diagnosis(es): The primary encounter diagnosis was Paresthesia of both lower extremities. Diagnoses of Generalized weakness, Paresthesia of both hands, and Chronic bilateral low back pain with bilateral sciatica were also pertinent to this visit. has a past medical history of Herniation of intervertebral disc between L5 and S1, Low back pain, and Sciatica. has a past surgical history that includes hernia repair (Right) and IR NONTUNNELED VASCULAR CATHETER > 5 YEARS (10/5/2021).     Restrictions  Restrictions/Precautions  Restrictions/Precautions: General Precautions, Fall Risk  Required Braces or Orthoses?: No  Position Activity Restriction  Other position/activity restrictions: Nontunneled plasmapharesis catheter, O2 NC  Subjective   General  Chart Reviewed: Yes  Response To Previous Treatment: Patient with no complaints from previous session. Family / Caregiver Present: No  Subjective  Subjective: patient reports feeling down today, frustrated that he is still in the hospital and will be for awhile, very agreeable and motivated to work with therapy  General Comment  Comments: RN states patient appropriate for therapy          Orientation  Orientation  Overall Orientation Status: Within Normal Limits  Cognition      Objective   Bed mobility  Comment: patient sitting up in chair upon arrival  Transfers  Sit to Stand: Contact guard assistance  Stand to sit: Contact guard assistance;Minimal Assistance  Comment: repeated sit to stands done as ther ex, had patient focus on not using UEs to push up with and use LEs more, also had patient focus on slow stand>sit to focus on eccentric control, as patient fatigued needed min A. Ambulation  Ambulation?: Yes  More Ambulation?: Yes  Ambulation 1  Surface: level tile  Device: Rolling Walker  Assistance: Minimal assistance  Quality of Gait: slow deliberate gait with focus on heel strike, patient became shaky at end of gait due to fatigue  Gait Deviations: Slow Krystyna;Decreased step length  Distance: 175'  Comments: Increased distance this date per patient request, patient fatigued senior living through gait and LEs became shaky. Ambulation 2  Surface - 2: level tile  Device 2: No device  Assistance 2: Moderate assistance  Gait Deviations: Slow Krystyna  Distance: 2 feet  Comments: patient attempted taking a few steps without device, decreased heel strike and poor knee control observed. Neuromuscular Education  Neuromuscular Comments: patient complains of N/T in UE/LEs, instructed patient in using rough towel for desensitization.  Also signficant facial weakness, had patient do 5 reps of eye brow raising, smiling, eye closing with manual assist if needed. Suggested patient use mirror this evening to practice facial exercises  Balance  Posture: Good  Sitting - Static: Good  Sitting - Dynamic: Good  Standing - Static: Fair;+  Standing - Dynamic: Fair  Exercises  Comments: Sit<>stand x5 reps with eccentric control and overhead reach, squats x10 with focus on terminal knee extension, heel/toe raise x6, LAQ with focus on slow lowering of LEs x10                          AM-PAC Score  AM-PAC Inpatient Mobility Raw Score : 17 (10/08/21 1521)  AM-PAC Inpatient T-Scale Score : 42.13 (10/08/21 1521)  Mobility Inpatient CMS 0-100% Score: 50.57 (10/08/21 1521)  Mobility Inpatient CMS G-Code Modifier : CK (10/08/21 1521)          Goals  Short term goals  Time Frame for Short term goals: 12 treatments  Short term goal 1: Independent bed mobility/transfers  Short term goal 2: Independent ambulation 200' x 1  Short term goal 3: Independently ascend/descend 1 flight of steps  Short term goal 4: Good standing balance  Short term goal 5:  Tolerate 30 min ther act/ 5/5 B LE's  Patient Goals   Patient goals : Get strength back    Plan    Plan  Times per week: 1-2x/day,5-6 days/week  Current Treatment Recommendations: Strengthening, Balance Training, Endurance Training, Transfer Training, Gait Training, Functional Mobility Training, Stair training, Neuromuscular Re-education  Safety Devices  Type of devices: Left in chair, Call light within reach, Gait belt  Restraints  Initially in place: No     Therapy Time   Individual Concurrent Group Co-treatment   Time In 1412         Time Out 1501         Minutes 1324 [facility] Ida, PT

## 2021-10-08 NOTE — PROGRESS NOTES
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 Low back pain . The condition is he walked 100 feet with rolling walker in PT . He had day 2 of 5 of plasma exchange having tolerated this fine  . Painful leg paresthesias are improved on neurontin 600 mg po tid . Third out of fifth plasma exchange is on Sunday . He has bilateral left facial weakness with only minor left lower facial contraction none on on right  Partial ability to close both eyes . Mild left facial contraction . None on right . Deltoids 5-/5 , biceps 5-/5 . Iliopsas and hamstrings , ADF 4+/5 . Low back pain is partially attenuated with motrin 800 mg po tid . He is questioning need for rehabilitation. 50 yo male with Guillain Cross Anchor having received IV IgG 0.4 gram/kg qd x 5 days . He presents with 4 days of weakness of legs and leg numbness along with arm weakness and right facial weakness that developed in hospital. Head CT normal . CT cervical and thoracic spine normal . CT lumbar sine with left L5-S1 disc protrusion . CSF analysis today 4 WBC , 8 RBC , total protein 273 and glucose 60. He is complaining of low back pain with history of disc herniation . Significant medications Neurontin 600 mg po tid , motrin 800mg po tid. Testing  Head CT normal . CT cervical and thoracic spine normal . CT lumbar spine with left L5-S1 disc protrusion . CSF analysis today 4 WBC , 8 RBC , total protein 273 and glucose 60 . IgG 0.4 gram/kg qd x 5 days. FU Head CT normal      Past Medical History:   Diagnosis Date    Herniation of intervertebral disc between L5 and S1     Low back pain     Sciatica        Past Surgical History:   Procedure Laterality Date    HERNIA REPAIR Right     inguinal    IR NONTUNNELED VASCULAR CATHETER  10/5/2021    IR NONTUNNELED VASCULAR CATHETER 10/5/2021 MD MARGARITO Ortiz [facility] PROC[facility]       History reviewed. No pertinent family history.     Social History Socioeconomic History    Marital status:      Spouse name: None    Number of children: None    Years of education: None    Highest education level: None   Occupational History    None   Tobacco Use    Smoking status: Former Smoker     Quit date: 2014     Years since quittin.0    Smokeless tobacco: Never Used   Substance and Sexual Activity    Alcohol use: Not Currently    Drug use: Not Currently    Sexual activity: None   Other Topics Concern    None   Social History Narrative    None     Social Determinants of Health     Financial Resource Strain:     Difficulty of Paying Living Expenses:    Food Insecurity:     Worried About Running Out of Food in the Last Year:     Ran Out of Food in the Last Year:    Transportation Needs:     Lack of Transportation (Medical):      Lack of Transportation (Non-Medical):    Physical Activity:     Days of Exercise per Week:     Minutes of Exercise per Session:    Stress:     Feeling of Stress :    Social Connections:     Frequency of Communication with Friends and Family:     Frequency of Social Gatherings with Friends and Family:     Attends Alevism Services:     Active Member of Clubs or Organizations:     Attends Club or Organization Meetings:     Marital Status:    Intimate Partner Violence:     Fear of Current or Ex-Partner:     Emotionally Abused:     Physically Abused:     Sexually Abused:        Current Facility-Administered Medications   Medication Dose Route Frequency Provider Last Rate Last Admin    gabapentin (NEURONTIN) capsule 600 mg  600 mg Oral TID Clark Payne MD   600 mg at 10/08/21 1510    calcium gluconate 2,000 mg in sodium chloride 0.9 % 100 mL IVPB  2,000 mg IntraVENous Once Clark Payne MD        albumin human 5 % IV solution 200 g  200 g IntraVENous Once Clark Payne MD        heparin (porcine) injection 15,000 Units  15,000 Units IntraCATHeter Once [employer] diphenhydrAMINE (BENADRYL) tablet 25 mg  25 mg Oral Q6H PRN Adrienne Cruz MD        zolpidem THC [place]) tablet 5 mg  5 mg Oral Nightly PRN Adrienne Cruz MD   5 mg at 10/07/21 2128    HYDROcodone-acetaminophen ([address]) 5-325 MG per tablet 1 tablet  1 tablet Oral Q4H PRN Gracy Anders MD   1 tablet at 10/08/21 0602       No Known Allergies    ROS:   Constitutional                  Negative for fever and chills   HEENT                            Negative for ear discharge, ear pain, nosebleed  Eyes                                Negative for photophobia, pain and discharge  Respiratory                      Negative for hemoptysis and sputum  Cardiovascular                Negative for orthopnea, claudication and PND  Gastrointestinal               Negative for abdominal pain, diarrhea, blood in stool  Musculoskeletal               Positive low back pain   Skin                                 Negative for rash or itching  Endo/heme/allergies       Negative for polydipsia, environmental allergy  Psychiatric                       Negative for suicidal ideation. Patient is not anxious    Vitals:    10/08/21 1200   BP: (!) 145/99   Pulse: 91   Resp: 16   Temp:    SpO2: 95%     Admission weight: 200 lb (90.7 kg)    Neurological Examination  Constitutional .General exam well groomed   Head/ Ears /Nose/Throat/external ear . Normal exam  Neck and thyroid . Normal size. No bruits  Respiratory . Breathsounds clear bilaterally  Cardiovascular:  Auscultation of heart with regular rate and rhythm   Musculoskeletal. Muscle bulk and tone normal                                                           Muscle strength deltoids 4/5 , biceps 4+/5 . liopsoas , hamstrings and ADF 5-/5 otherwise 5/5 strength throughout                                                                                No dysmetria or dysdiadokinesis  No tremor   Normal fine motor  Orientation Alert and oriented x 3   Attention and concentration normal  Short term memory normal  Language process and speech normal . No aphasia   Cranial nerve 2 normal acuety and visual fields  Cranial nerve 3, 4 and 6 . Extraocular muscles are intact . Pupils are equal and reactive   Cranial nerve 5 . Normal strength of masseter and temporalis . Intact corneal reflex. Normal facial sensation  Cranial nerve 7 minor left lower facial contraction none on on right  Partial ability to close both eyes . Mild left lower facial contraction . None on right lower facial weakness . Decrease resistance right eye closure . Decrease right frontalis contraction . None left   Cranial nerve 8. Grossly intact hearing   Cranial nerve 9 and 10. Symmetric palate elevation   Cranial nerve 11 , 5 out of 5 strength   Cranial Nerve 12 midline tongue . No atrophy  Sensation Normal pinprick and light touch   Deep Tendon Reflexes areflexic   Plantar response flexor bilaterally    Assessment :    Guillian Bandon syndrome . Low back pain     Plan:    Continue plasma exchange day 3 of 5 on Sunday .  PMR consultation

## 2021-10-08 NOTE — FLOWSHEET NOTE
Kosse arrived to the patient's bedside to initiate plasmapheresis. RN provided print out of laboratory results and retrieved the following medications from [facility], heparin, calcium gluconate, and albumin.

## 2021-10-08 NOTE — PROGRESS NOTES
[facility] Hospitalist        10/8/2021   6:33 PM    Name:  Maximiliano Craig  MRN:    [id number]     Acct:     [de-identified]   Room:  2041/2041-01  IP Day: 8     Admit Date: 9/29/2021  8:10 PM  PCP: Juanjo Melgar MD    C/C:   Chief Complaint   Patient presents with    Extremity Weakness    Hand Numbness       Assessment:      · Guillain [place] syndrome  · Upper and lower extremity weakness/Paresthesias  · Worsening left-sided facial droop  · Lumbar arthritis/Low back pain  · Lumbar herniated disc  · Acute hypoxic respitratory failure  · Fall  · Essential Hypertension  · Mixed hyperlipidemia   · Constipation    · GERD without esophagitis   · Anxiety disorder  · Insomnia       Plan:        · Patient is currently in ICU  · Altered mental status General 92%  · Neurochecks per protocol  · S/p IVIG x5  · Plasmapheresis x5, started on 10/6/2021  · Speech evaluation  · CT brain no acute changes  · CT abdomen and pelvis without contrast no acute changes  · Neurology following  · GI cocktail x1  · DVT and GI prophylaxis  · Continue to monitor/telemetry/CBC with differential daily/BMP daily  · Continue medications as below    Scheduled Meds:   gabapentin  600 mg Oral TID    calcium gluconate IVPB  2,000 mg IntraVENous Once    albumin human  200 g IntraVENous Once    heparin (porcine)  15,000 Units IntraCATHeter Once    amLODIPine  10 mg Oral Daily    docusate sodium  100 mg Oral Daily    famotidine  20 mg Oral BID    sodium chloride flush  10 mL IntraVENous 2 times per day    enoxaparin  40 mg SubCUTAneous Daily     Continuous Infusions:   sodium chloride 25 mL (10/02/21 1339)     PRN Meds:  ibuprofen, 800 mg, Q8H PRN  acetaminophen, 650 mg, Q4H PRN  calcium gluconate IVPB, 1,000 mg, PRN  hydrALAZINE, 10 mg, Q6H PRN  ALPRAZolam, 0.25 mg, BID PRN  senna, 1 tablet, BID PRN  aluminum & magnesium hydroxide-simethicone, 30 mL, TID PRN  sodium chloride flush, 10 mL, PRN  sodium chloride, 25 mL, PRN  ondansetron, 4 mg, Q8H PRN   Or  ondansetron, 4 mg, Q6H PRN  magnesium hydroxide, 30 mL, Daily PRN  diphenhydrAMINE, 25 mg, Q6H PRN  zolpidem, 5 mg, Nightly PRN  HYDROcodone 5 mg - acetaminophen, 1 tablet, Q4H PRN            Subjective:     Patient seen and examined at bedside. Patient has persistent facial droop and weakness. Also complaining of lower quadrant abdominal pain. Afebrile      ROS:  A 10 point system reviewed and negative otherwise mentioned above. Physical Examination:      Vitals:  BP (!) 131/101   Pulse 108   Temp 97.8 °F (36.6 °C) (Temporal)   Resp 17   Ht 6' (1.829 m)   Wt 200 lb (90.7 kg)   SpO2 95%   BMI 27.12 kg/m²   Temp (24hrs), Av.9 °F (36.6 °C), Min:97.6 °F (36.4 °C), Max:98.2 °F (36.8 °C)    Weight:   Wt Readings from Last 3 Encounters:   10/08/21 200 lb (90.7 kg)     I/O last 3 completed shifts:  I/O last 3 completed shifts: In: 1100 [P.O.:1100]  Out: 1350 [Urine:1350]     No results for input(s): POCGLU in the last 72 hours. General appearance - alert, well appearing, and in no acute distress  Mental status - oriented to person, place, and time with normal affect  Head - normocephalic and atraumatic  Eyes - pupils equal and reactive, extraocular eye movements intact, conjunctiva clear  Ears - hearing appears to be intact  Nose - no drainage noted  Mouth - mucous membranes moist  Neck - supple, no carotid bruits, thyroid not palpable  Chest - clear to auscultation, normal effort  Heart - normal rate, regular rhythm, no murmur  Abdomen - soft, nontender, nondistended, bowel sounds present all four quadrants, no masses, hepatomegaly or splenomegaly  Neurological - normal speech, no focal findings or movement disorder noted, cranial nerves II through XII grossly intact  Extremities - peripheral pulses palpable, no pedal edema or calf pain with palpation  Skin - no gross lesions, rashes, or induration noted        Medications:      Allergies: No Known Allergies    Current Meds:   Current Facility-Administered Medications:     gabapentin (NEURONTIN) capsule 600 mg, 600 mg, Oral, TID, Poly Sanon MD, 600 mg at 10/08/21 1510    calcium gluconate 2,000 mg in sodium chloride 0.9 % 100 mL IVPB, 2,000 mg, IntraVENous, Once, Poly Sanon MD    albumin human 5 % IV solution 200 g, 200 g, IntraVENous, Once, Poly Sanon MD    heparin (porcine) injection 15,000 Units, 15,000 Units, IntraCATHeter, Once, Poly Sanon MD    ibuprofen (ADVIL;MOTRIN) tablet 800 mg, 800 mg, Oral, Q8H PRN, Shmuel Momin MD, 800 mg at 10/08/21 1143    acetaminophen (TYLENOL) tablet 650 mg, 650 mg, Oral, Q4H PRN, Nishi Dunham MD    calcium gluconate 1,000 mg in sodium chloride 0.9 % 100 mL IVPB, 1,000 mg, IntraVENous, PRN, Poly Sanon MD    amLODIPine (NORVASC) tablet 10 mg, 10 mg, Oral, Daily, Albaro Spencer MD, 10 mg at 10/08/21 0847    hydrALAZINE (APRESOLINE) injection 10 mg, 10 mg, IntraVENous, Q6H PRN, Zuly Mcdaniel MD, 10 mg at 10/05/21 0219    ALPRAZolam (XANAX) tablet 0.25 mg, 0.25 mg, Oral, BID PRN, Zuly Mcdaniel MD, 0.25 mg at 10/08/21 0202    senna (SENOKOT) tablet 8.6 mg, 1 tablet, Oral, BID PRN, Zuly Mcdaniel MD, 8.6 mg at 10/07/21 0840    docusate sodium (COLACE) capsule 100 mg, 100 mg, Oral, Daily, Zuly Mcdaniel MD, 100 mg at 10/08/21 0847    aluminum & magnesium hydroxide-simethicone (MAALOX) 200-200-20 MG/5ML suspension 30 mL, 30 mL, Oral, TID PRN, Albaro Spencer MD, 30 mL at 10/04/21 1736    famotidine (PEPCID) tablet 20 mg, 20 mg, Oral, BID, Albaro Spencer MD, 20 mg at 10/08/21 0700    sodium chloride flush 0.9 % injection 10 mL, 10 mL, IntraVENous, 2 times per day, Zuly Mcdaniel MD, 10 mL at 10/07/21 2129    sodium chloride flush 0.9 % injection 10 mL, 10 mL, IntraVENous, PRN, Albaro Spencer MD    0.9 % sodium chloride infusion, 25 mL, IntraVENous, PRN, Albaro Spencer MD, Last Rate: 100 mL/hr at 10/02/21 1339, 25 mL at 10/02/21 1339    ondansetron (ZOFRAN-ODT) disintegrating tablet 4 mg, 4 mg, Oral, Q8H PRN, 4 mg at 09/30/21 1514 **OR** ondansetron (ZOFRAN) injection 4 mg, 4 mg, IntraVENous, Q6H PRN, Albaro Spencer MD, 4 mg at 10/03/21 0433    magnesium hydroxide (MILK OF MAGNESIA) 400 MG/5ML suspension 30 mL, 30 mL, Oral, Daily PRN, Albaro Spencer MD, 30 mL at 10/08/21 1102    enoxaparin (LOVENOX) injection 40 mg, 40 mg, SubCUTAneous, Daily, Albaro Spencer MD, 40 mg at 10/08/21 0848    diphenhydrAMINE (BENADRYL) tablet 25 mg, 25 mg, Oral, Q6H PRN, Kiara Fermin MD    zolpidem THC [place]) tablet 5 mg, 5 mg, Oral, Nightly PRN, Kiara Fermin MD, 5 mg at 10/07/21 2128    HYDROcodone-acetaminophen (NORCO) 5-325 MG per tablet 1 tablet, 1 tablet, Oral, Q4H PRN, Kale Boggs MD, 1 tablet at 10/08/21 0602      I/O (24Hr): Intake/Output Summary (Last 24 hours) at 10/8/2021 1833  Last data filed at 10/8/2021 0600  Gross per 24 hour   Intake    Output 850 ml   Net -850 ml       Data:           Labs:    Hematology:  Recent Labs     10/06/21  0458 10/08/21  0535   WBC 6.8 5.7   RBC 5.30 5.14   HGB 14.5 14.0   HCT 43.7 42.8   MCV 82.5* 83.3   MCH 27.4 27.2   MCHC 33.2 32.7   RDW 12.6 12.7    188   MPV 9.7 10.0     Chemistry:  Recent Labs     10/06/21  0458 10/07/21  0502 10/08/21  0535    139 137   K 4.1 4.3 4.3    106 106   CO2 22 22 21   GLUCOSE 102* 98 95   BUN 14 14 15   CREATININE 1.12 1.19 0.97   ANIONGAP 12 11 10   LABGLOM >60 >60 >60   GFRAA >60 >60 >60   CALCIUM 8.9 8.7 8.6   CAION 1.27  --   --      No results for input(s): PROT, LABALBU, LABA1C, S5VVCAM, Q2AXCPB, FT4, TSH, AST, ALT, LDH, GGT, ALKPHOS, LABGGT, BILITOT, BILIDIR, AMMONIA, AMYLASE, LIPASE, LACTATE, CHOL, HDL, LDLCHOLESTEROL, CHOLHDLRATIO, TRIG, VLDL, GKH72OR, PHENYTOIN, PHENYF, URICACID, POCGLU in the last 72 hours.     No results found for: SPECIAL  No results found for: CULTURE    No results found for: POCPH, PHART, PH, POCPCO2, YLT1ZXK, PCO2, POCPO2, PO2ART, PO2, POCHCO3, TLD8NAN, HCO3, NBEA, PBEA, BEART, BE, THGBART, THB, WUV4SOE, JGRL7ZQB, I4UOTIOM, O2SAT, FIO2    Radiology:    CT Head WO Contrast    Result Date: 9/29/2021  No acute intracranial abnormality. CT CERVICAL SPINE WO CONTRAST    Result Date: 9/29/2021  No acute abnormality of the cervical spine. CT THORACIC SPINE WO CONTRAST    Result Date: 9/29/2021  1. No large disc herniation or protrusion by CT. 2. Other findings as described. CT Lumbar Spine WO Contrast    Result Date: 9/29/2021  1. No acute fracture. No listhesis. 2. Other findings as described. IR LUMBAR PUNCTURE FOR DIAGNOSIS    Result Date: 10/4/2021  Successful fluoroscopic-guided lumbar puncture. All radiological studies reviewed  Code Status:  Full Code        Electronically signed by Lesly Levy MD on 10/8/2021 at 6:33 PM    This note was created with the assistance of a speech-recognition program.  Although the intention is to generate a document that actually reflects the content of the visit, no guarantees can be provided that every mistake has been identified and corrected by editing. Note was updated later by me after  physical examination and  completion of the assessment.

## 2021-10-09 LAB
ANION GAP SERPL CALCULATED.3IONS-SCNC: 8 MMOL/L (ref 9–17)
BUN BLDV-MCNC: 13 MG/DL (ref 6–20)
BUN/CREAT BLD: 14 (ref 9–20)
CALCIUM SERPL-MCNC: 8.5 MG/DL (ref 8.6–10.4)
CHLORIDE BLD-SCNC: 107 MMOL/L (ref 98–107)
CO2: 21 MMOL/L (ref 20–31)
CREAT SERPL-MCNC: 0.96 MG/DL (ref 0.7–1.2)
GFR AFRICAN AMERICAN: >60 ML/MIN
GFR NON-AFRICAN AMERICAN: >60 ML/MIN
GFR SERPL CREATININE-BSD FRML MDRD: ABNORMAL ML/MIN/{1.73_M2}
GFR SERPL CREATININE-BSD FRML MDRD: ABNORMAL ML/MIN/{1.73_M2}
GLUCOSE BLD-MCNC: 101 MG/DL (ref 70–99)
POTASSIUM SERPL-SCNC: 4.8 MMOL/L (ref 3.7–5.3)
SODIUM BLD-SCNC: 136 MMOL/L (ref 135–144)

## 2021-10-09 PROCEDURE — 6360000002 HC RX W HCPCS: Performed by: FAMILY MEDICINE

## 2021-10-09 PROCEDURE — 6370000000 HC RX 637 (ALT 250 FOR IP): Performed by: FAMILY MEDICINE

## 2021-10-09 PROCEDURE — 6370000000 HC RX 637 (ALT 250 FOR IP): Performed by: PSYCHIATRY & NEUROLOGY

## 2021-10-09 PROCEDURE — 99233 SBSQ HOSP IP/OBS HIGH 50: CPT | Performed by: PSYCHIATRY & NEUROLOGY

## 2021-10-09 PROCEDURE — 97530 THERAPEUTIC ACTIVITIES: CPT

## 2021-10-09 PROCEDURE — 36415 COLL VENOUS BLD VENIPUNCTURE: CPT

## 2021-10-09 PROCEDURE — 6370000000 HC RX 637 (ALT 250 FOR IP): Performed by: HOSPITALIST

## 2021-10-09 PROCEDURE — 97116 GAIT TRAINING THERAPY: CPT

## 2021-10-09 PROCEDURE — 2580000003 HC RX 258: Performed by: FAMILY MEDICINE

## 2021-10-09 PROCEDURE — 2060000000 HC ICU INTERMEDIATE R&B

## 2021-10-09 PROCEDURE — 97110 THERAPEUTIC EXERCISES: CPT

## 2021-10-09 PROCEDURE — 80048 BASIC METABOLIC PNL TOTAL CA: CPT

## 2021-10-09 RX ORDER — AMITRIPTYLINE HYDROCHLORIDE 25 MG/1
25 TABLET, FILM COATED ORAL NIGHTLY
Status: DISCONTINUED | OUTPATIENT
Start: 2021-10-09 | End: 2021-10-11

## 2021-10-09 RX ADMIN — ZOLPIDEM TARTRATE 5 MG: 5 TABLET ORAL at 22:25

## 2021-10-09 RX ADMIN — SODIUM CHLORIDE, PRESERVATIVE FREE 10 ML: 5 INJECTION INTRAVENOUS at 20:25

## 2021-10-09 RX ADMIN — FAMOTIDINE 20 MG: 20 TABLET, FILM COATED ORAL at 08:20

## 2021-10-09 RX ADMIN — FAMOTIDINE 20 MG: 20 TABLET, FILM COATED ORAL at 20:24

## 2021-10-09 RX ADMIN — GABAPENTIN 600 MG: 300 CAPSULE ORAL at 20:24

## 2021-10-09 RX ADMIN — IBUPROFEN 800 MG: 800 TABLET, FILM COATED ORAL at 22:24

## 2021-10-09 RX ADMIN — GABAPENTIN 600 MG: 300 CAPSULE ORAL at 08:20

## 2021-10-09 RX ADMIN — AMLODIPINE BESYLATE 10 MG: 10 TABLET ORAL at 08:19

## 2021-10-09 RX ADMIN — SODIUM CHLORIDE, PRESERVATIVE FREE 10 ML: 5 INJECTION INTRAVENOUS at 09:00

## 2021-10-09 RX ADMIN — GABAPENTIN 600 MG: 300 CAPSULE ORAL at 15:19

## 2021-10-09 RX ADMIN — ENOXAPARIN SODIUM 40 MG: 40 INJECTION SUBCUTANEOUS at 08:20

## 2021-10-09 RX ADMIN — AMITRIPTYLINE HYDROCHLORIDE 25 MG: 25 TABLET, FILM COATED ORAL at 20:24

## 2021-10-09 RX ADMIN — IBUPROFEN 800 MG: 800 TABLET, FILM COATED ORAL at 08:20

## 2021-10-09 RX ADMIN — DOCUSATE SODIUM 100 MG: 100 CAPSULE, LIQUID FILLED ORAL at 08:19

## 2021-10-09 ASSESSMENT — PAIN SCALES - GENERAL
PAINLEVEL_OUTOF10: 0
PAINLEVEL_OUTOF10: 6
PAINLEVEL_OUTOF10: 5

## 2021-10-09 NOTE — PROGRESS NOTES
Physical Therapy  Facility/Department: [facility] CV[facility]  Daily Treatment Note  NAME: Sergey Lyons  : 1975  MRN: [id number]    Date of Service: 10/9/2021    Discharge Recommendations:  Patient would benefit from continued therapy after discharge        Assessment   Body structures, Functions, Activity limitations: Decreased functional mobility ; Decreased strength;Decreased endurance;Decreased balance;Decreased safe awareness;Decreased sensation  Assessment: Pt demos improving activity tolerance, reports that recovery of strength seems to be distal to proximal, the same pattern as onset of symptoms. Prognosis: Excellent  REQUIRES PT FOLLOW UP: Yes  Activity Tolerance  Activity Tolerance: Patient Tolerated treatment well  Activity Tolerance: Introduced standing LE ther ex this tx with good tolerance. Patient Diagnosis(es): The primary encounter diagnosis was Paresthesia of both lower extremities. Diagnoses of Generalized weakness, Paresthesia of both hands, and Chronic bilateral low back pain with bilateral sciatica were also pertinent to this visit. has a past medical history of Herniation of intervertebral disc between L5 and S1, Low back pain, and Sciatica. has a past surgical history that includes hernia repair (Right) and IR NONTUNNELED VASCULAR CATHETER > 5 YEARS (10/5/2021). Restrictions  Restrictions/Precautions  Restrictions/Precautions: General Precautions, Fall Risk  Required Braces or Orthoses?: No  Position Activity Restriction  Other position/activity restrictions: OF NOTE: NEW DX OF GUILLAIN-BARRE SYNDROME  Subjective   General  Chart Reviewed: Yes  Response To Previous Treatment: Patient with no complaints from previous session. Family / Caregiver Present: Yes  Subjective  Subjective: Pt is motivated, pleasant & cooperative.   General Comment  Comments: RN states patient appropriate for therapy  Pain Screening  Patient Currently in Pain: Yes (Generalized nerve pain,primarily UE's)  Pain ascend/descend 1 flight of steps  Short term goal 4: Good standing balance  Short term goal 5:  Tolerate 30 min ther act/ 5/5 SALVADOR YUSUF's  Patient Goals   Patient goals : Get strength back    Plan    Plan  Times per week: 1-2x/day,5-6 days/week  Current Treatment Recommendations: Strengthening, Balance Training, Endurance Training, Transfer Training, Gait Training, Functional Mobility Training, Stair training, Neuromuscular Re-education  Safety Devices  Type of devices: Left in chair, Call light within reach, Gait belt  Restraints  Initially in place: No     Therapy Time   Individual Concurrent Group Co-treatment   Time In [address] [place] [address]         Time Out 1111         Minutes [address] [facility]

## 2021-10-09 NOTE — PROGRESS NOTES
Patient sitting comfortably in chair  On RA SpO2 96%  HR: 76 bpm  RR: 16, normal  NIF > -40  Will continue to monitor.

## 2021-10-09 NOTE — PROGRESS NOTES
Pt vitals stable . Pt ambulating with a steady gait. Pt reports ongoing Numbness and tingling in his arms and legs.

## 2021-10-09 NOTE — PLAN OF CARE
Problem: Falls - Risk of:  Goal: Will remain free from falls  Description: Will remain free from falls  Outcome: Met This Shift     Problem: Safety:  Goal: Free from accidental physical injury  Description: Free from accidental physical injury  Outcome: Met This Shift     Problem: Daily Care:  Goal: Daily care needs are met  Description: Daily care needs are met  Outcome: Met This Shift     Problem: Pain:  Goal: Patient's pain/discomfort is manageable  Description: Patient's pain/discomfort is manageable  Outcome: Met This Shift     Problem: Skin Integrity:  Goal: Skin integrity will stabilize  Description: Skin integrity will stabilize  Outcome: Met This Shift     Problem: Discharge Planning:  Goal: Patients continuum of care needs are met  Description: Patients continuum of care needs are met  Outcome: Met This Shift

## 2021-10-09 NOTE — PROGRESS NOTES
 Marital status:      Spouse name: None    Number of children: None    Years of education: None    Highest education level: None   Occupational History    None   Tobacco Use    Smoking status: Former Smoker     Quit date: 2014     Years since quittin.0    Smokeless tobacco: Never Used   Substance and Sexual Activity    Alcohol use: Not Currently    Drug use: Not Currently    Sexual activity: None   Other Topics Concern    None   Social History Narrative    None     Social Determinants of Health     Financial Resource Strain:     Difficulty of Paying Living Expenses:    Food Insecurity:     Worried About Running Out of Food in the Last Year:     Ran Out of Food in the Last Year:    Transportation Needs:     Lack of Transportation (Medical):      Lack of Transportation (Non-Medical):    Physical Activity:     Days of Exercise per Week:     Minutes of Exercise per Session:    Stress:     Feeling of Stress :    Social Connections:     Frequency of Communication with Friends and Family:     Frequency of Social Gatherings with Friends and Family:     Attends [employer] Services:     Active Member of Clubs or Organizations:     Attends Club or Organization Meetings:     Marital Status:    Intimate Partner Violence:     Fear of Current or Ex-Partner:     Emotionally Abused:     Physically Abused:     Sexually Abused:        Current Facility-Administered Medications   Medication Dose Route Frequency Provider Last Rate Last Admin    gabapentin (NEURONTIN) capsule 600 mg  600 mg Oral TID Luis Carlos Gagnon MD   600 mg at 10/09/21 0820    calcium gluconate 2,000 mg in sodium chloride 0.9 % 100 mL IVPB  2,000 mg IntraVENous Once Luis Carlos Gagnon MD        albumin human 5 % IV solution 200 g  200 g IntraVENous Once Luis Carlos Gagnon MD        heparin (porcine) injection 15,000 Units  15,000 Units IntraCATHeter Once Luis Carlos Gagnon MD        ibuprofen (ADVIL;MOTRIN) tablet 800 mg  800 mg Oral Q8H PRN Alfred Smith MD   800 mg at 10/09/21 0820    acetaminophen (TYLENOL) tablet 650 mg  650 mg Oral Q4H PRN Waylan Kanner, MD        calcium gluconate 1,000 mg in sodium chloride 0.9 % 100 mL IVPB  1,000 mg IntraVENous PRN Annika Costa MD        amLODIPine (NORVASC) tablet 10 mg  10 mg Oral Daily Albaro Spencer MD   10 mg at 10/09/21 0819    hydrALAZINE (APRESOLINE) injection 10 mg  10 mg IntraVENous Q6H PRN Albaro Spencer MD   10 mg at 10/05/21 0219    ALPRAZolam (XANAX) tablet 0.25 mg  0.25 mg Oral BID PRN Albaro Spencer MD   0.25 mg at 10/08/21 0202    senna (SENOKOT) tablet 8.6 mg  1 tablet Oral BID PRN Albaro Spencer MD   8.6 mg at 10/08/21 2223    docusate sodium (COLACE) capsule 100 mg  100 mg Oral Daily Albaro Spencer MD   100 mg at 10/09/21 0819    aluminum & magnesium hydroxide-simethicone (MAALOX) 200-200-20 MG/5ML suspension 30 mL  30 mL Oral TID PRN Albaro Spencer MD   30 mL at 10/04/21 1736    famotidine (PEPCID) tablet 20 mg  20 mg Oral BID Shamar Decker MD   20 mg at 10/09/21 0820    sodium chloride flush 0.9 % injection 10 mL  10 mL IntraVENous 2 times per day Shamar Decker MD   10 mL at 10/09/21 0900    sodium chloride flush 0.9 % injection 10 mL  10 mL IntraVENous PRN Albaro Spencer MD        0.9 % sodium chloride infusion  25 mL IntraVENous PRN Albaro Spencer  mL/hr at 10/02/21 1339 25 mL at 10/02/21 1339    ondansetron (ZOFRAN-ODT) disintegrating tablet 4 mg  4 mg Oral Q8H PRN Albaro Spencer MD   4 mg at 09/30/21 1514    Or    ondansetron (ZOFRAN) injection 4 mg  4 mg IntraVENous Q6H PRN Albaro Spencer MD   4 mg at 10/03/21 0433    magnesium hydroxide (MILK OF MAGNESIA) 400 MG/5ML suspension 30 mL  30 mL Oral Daily PRN Albaro Spencer MD   30 mL at 10/08/21 1102    enoxaparin (LOVENOX) injection 40 mg  40 mg SubCUTAneous Daily Albaro Spencer MD   40 mg at 10/09/21 0820    diphenhydrAMINE (BENADRYL) tablet 25 mg 25 mg Oral Q6H PRN Arnaldo Alvarez MD        zolpidem [facility] [place]) tablet 5 mg  5 mg Oral Nightly PRN Arnaldo Alvarez MD   5 mg at 10/08/21 2223    HYDROcodone-acetaminophen (Arnetha Kubas) 5-325 MG per tablet 1 tablet  1 tablet Oral Q4H PRN Kirk Veloz MD   1 tablet at 10/08/21 0602       No Known Allergies    ROS:   Constitutional                  Negative for fever and chills   HEENT                            Negative for ear discharge, ear pain, nosebleed  Eyes                                Negative for photophobia, pain and discharge  Respiratory                      Negative for hemoptysis and sputum  Cardiovascular                Negative for orthopnea, claudication and PND  Gastrointestinal               Negative for abdominal pain, diarrhea, blood in stool  Musculoskeletal               Positive low back pain   Skin                                 Negative for rash or itching  Endo/heme/allergies       Negative for polydipsia, environmental allergy  Psychiatric                       Negative for suicidal ideation. Patient is not anxious    Vitals:    10/09/21 1200   BP:    Pulse:    Resp: 18   Temp: 97.6 °F (36.4 °C)   SpO2:      Admission weight: 200 lb (90.7 kg)    Neurological Examination  Constitutional .General exam well groomed   Head/ Ears /Nose/Throat/external ear . Normal exam  Neck and thyroid . Normal size. No bruits  Respiratory . Breathsounds clear bilaterally  Cardiovascular: Auscultation of heart with regular rate and rhythm   Musculoskeletal. Muscle bulk and tone normal                                                           Muscle strength deltoids 5-/5 , biceps 5-/5 .  Iliopsoas , hamstrings and ADF 4+/5 otherwise 5/5 strength throughout                                                                                No dysmetria or dysdiadokinesis  No tremor   Normal fine motor  Orientation Alert and oriented x 3   Attention and concentration normal  Short term memory normal  Language process and speech normal . No aphasia   Cranial nerve 2 normal acuety and visual fields  Cranial nerve 3, 4 and 6 . Extraocular muscles are intact . Pupils are equal and reactive   Cranial nerve 5 . Normal strength of masseter and temporalis . Intact corneal reflex. Normal facial sensation  Cranial nerve 7 Minor left lower facial contraction none on on right  Partial ability to close both eyes . Mild left lower facial contraction . None on right lower facial weakness . Decrease resistance eye closure bilaterally . Decrease right frontalis contraction . None left   Cranial nerve 8. Grossly intact hearing   Cranial nerve 9 and 10. Symmetric palate elevation   Cranial nerve 11 , 5 out of 5 strength   Cranial Nerve 12 midline tongue . 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 Low back pain     Plan:    Continue plasma exchange day 3 of 5 on Sunday . Add elavil 25 mg po qhs to neurontin 600 mg po tid for painful paresthesias .  PMR consultation

## 2021-10-09 NOTE — PROGRESS NOTES
[address] Hospitalist        10/9/2021   2:59 PM    Name:  Marilin Louis  MRN:    [id number]     Acct:     [de-identified]   Room:  2041/2041-01  IP Day: 9     Admit Date: 9/29/2021  8:10 PM  PCP: Paramjit Gaytan MD    C/C:   Chief Complaint   Patient presents with    Extremity Weakness    Hand Numbness       Assessment:      · Guillain [place] syndrome  · Upper and lower extremity weakness/Paresthesias  · Worsening left-sided facial droop  · Lumbar arthritis/Low back pain  · Lumbar herniated disc  · Acute hypoxic respitratory failure  · Fall  · Essential Hypertension  · Mixed hyperlipidemia   · Constipation    · GERD without esophagitis   · Anxiety disorder  · Insomnia       Plan:        · Patient is currently in ICU  · Altered mental status General 92%  · Neurochecks per protocol  · S/p IVIG x5  · Plasmapheresis x5, started on 10/6/2021  · Speech evaluation  · CT brain no acute changes  · CT abdomen and pelvis without contrast no acute changes  · Neurology following  · DVT and GI prophylaxis  · Continue to monitor/telemetry/CBC with differential daily/BMP daily  · Continue medications as below    Scheduled Meds:   amitriptyline  25 mg Oral Nightly    gabapentin  600 mg Oral TID    calcium gluconate IVPB  2,000 mg IntraVENous Once    albumin human  200 g IntraVENous Once    heparin (porcine)  15,000 Units IntraCATHeter Once    amLODIPine  10 mg Oral Daily    docusate sodium  100 mg Oral Daily    famotidine  20 mg Oral BID    sodium chloride flush  10 mL IntraVENous 2 times per day    enoxaparin  40 mg SubCUTAneous Daily     Continuous Infusions:   sodium chloride 25 mL (10/02/21 1339)     PRN Meds:  ibuprofen, 800 mg, Q8H PRN  acetaminophen, 650 mg, Q4H PRN  calcium gluconate IVPB, 1,000 mg, PRN  hydrALAZINE, 10 mg, Q6H PRN  ALPRAZolam, 0.25 mg, BID PRN  senna, 1 tablet, BID PRN  aluminum & magnesium hydroxide-simethicone, 30 mL, TID PRN  sodium chloride flush, 10 mL, PRN  sodium chloride, 25 mL, PRN  ondansetron, 4 mg, Q8H PRN   Or  ondansetron, 4 mg, Q6H PRN  magnesium hydroxide, 30 mL, Daily PRN  diphenhydrAMINE, 25 mg, Q6H PRN  zolpidem, 5 mg, Nightly PRN  HYDROcodone 5 mg - acetaminophen, 1 tablet, Q4H PRN            Subjective:     Patient seen and examined at bedside and reviewed  last 24 hrs events with nursing staff  No acute events overnight. Patient denies any acute complaints. Afebrile  Patient denies any chest pain, shortness of Breath, palpitation, headache, dizziness, cough, nausea, vomiting, abdominal pain, pain, changes in urination or bowel habit or rash. Patient still continues to have neurological symptoms continued on plasmapheresis        ROS:  A 10 point system reviewed and negative otherwise mentioned above. Physical Examination:      Vitals:  BP (!) 158/116   Pulse 60   Temp 97.6 °F (36.4 °C) (Temporal)   Resp 18   Ht 6' (1.829 m)   Wt 200 lb (90.7 kg)   SpO2 95%   BMI 27.12 kg/m²   Temp (24hrs), Av.9 °F (36.6 °C), Min:97.5 °F (36.4 °C), Max:98.5 °F (36.9 °C)    Weight:   Wt Readings from Last 3 Encounters:   10/09/21 200 lb (90.7 kg)     I/O last 3 completed shifts:  I/O last 3 completed shifts: In: 240 [P.O.:240]  Out: -      No results for input(s): POCGLU in the last 72 hours.       General appearance - alert, well appearing, and in no acute distress  Mental status - oriented to person, place, and time with normal affect  Head - normocephalic and atraumatic  Eyes - pupils equal and reactive, extraocular eye movements intact, conjunctiva clear  Ears - hearing appears to be intact  Nose - no drainage noted  Mouth - mucous membranes moist  Neck - supple, no carotid bruits, thyroid not palpable  Chest - clear to auscultation, normal effort  Heart - normal rate, regular rhythm, no murmur  Abdomen - soft, nontender, nondistended, bowel sounds present all four quadrants, no masses, hepatomegaly or splenomegaly  Neurological - normal speech, no focal findings or movement disorder noted, cranial nerves II through XII grossly intact  Extremities - peripheral pulses palpable, no pedal edema or calf pain with palpation  Skin - no gross lesions, rashes, or induration noted        Medications:      Allergies: No Known Allergies    Current Meds:   Current Facility-Administered Medications:     amitriptyline (ELAVIL) tablet 25 mg, 25 mg, Oral, Nightly, Chavo Garsia MD    gabapentin (NEURONTIN) capsule 600 mg, 600 mg, Oral, TID, Chavo Garsia MD, 600 mg at 10/09/21 0820    calcium gluconate 2,000 mg in sodium chloride 0.9 % 100 mL IVPB, 2,000 mg, IntraVENous, Once, Chavo Garsia MD    albumin human 5 % IV solution 200 g, 200 g, IntraVENous, Once, Chavo Garsia MD    heparin (porcine) injection 15,000 Units, 15,000 Units, IntraCATHeter, Once, Chavo Garsia MD    ibuprofen (ADVIL;MOTRIN) tablet 800 mg, 800 mg, Oral, Q8H PRN, Rasheed Treviño MD, 800 mg at 10/09/21 0820    acetaminophen (TYLENOL) tablet 650 mg, 650 mg, Oral, Q4H PRN, Taiow Mehta MD    calcium gluconate 1,000 mg in sodium chloride 0.9 % 100 mL IVPB, 1,000 mg, IntraVENous, PRN, Chavo Garsia MD    amLODIPine (NORVASC) tablet 10 mg, 10 mg, Oral, Daily, Albaro Spencer MD, 10 mg at 10/09/21 0819    hydrALAZINE (APRESOLINE) injection 10 mg, 10 mg, IntraVENous, Q6H PRN, Albaro Spencer MD, 10 mg at 10/05/21 0219    ALPRAZolam (XANAX) tablet 0.25 mg, 0.25 mg, Oral, BID PRN, Jarvis Muller MD, 0.25 mg at 10/08/21 0202    senna (SENOKOT) tablet 8.6 mg, 1 tablet, Oral, BID PRN, Jarvis Muller MD, 8.6 mg at 10/08/21 2223    docusate sodium (COLACE) capsule 100 mg, 100 mg, Oral, Daily, Albaro Spencer MD, 100 mg at 10/09/21 0819    aluminum & magnesium hydroxide-simethicone (MAALOX) 200-200-20 MG/5ML suspension 30 mL, 30 mL, Oral, TID PRN, Albaro Spencer MD, 30 mL at 10/04/21 1736    famotidine (PEPCID) tablet 20 mg, 20 mg, Oral, BID, Albaro Spencer MD, 20 mg at 10/09/21 0820    sodium chloride flush 0.9 % injection 10 mL, 10 mL, IntraVENous, 2 times per day, Kirk Choi MD, 10 mL at 10/09/21 0900    sodium chloride flush 0.9 % injection 10 mL, 10 mL, IntraVENous, PRN, Albaro Spencer MD    0.9 % sodium chloride infusion, 25 mL, IntraVENous, PRN, Albaro Spencer MD, Last Rate: 100 mL/hr at 10/02/21 1339, 25 mL at 10/02/21 1339    ondansetron (ZOFRAN-ODT) disintegrating tablet 4 mg, 4 mg, Oral, Q8H PRN, 4 mg at 09/30/21 1514 **OR** ondansetron (ZOFRAN) injection 4 mg, 4 mg, IntraVENous, Q6H PRN, Albaro Spencer MD, 4 mg at 10/03/21 0433    magnesium hydroxide (MILK OF MAGNESIA) 400 MG/5ML suspension 30 mL, 30 mL, Oral, Daily PRN, Albaro Spencer MD, 30 mL at 10/08/21 1102    enoxaparin (LOVENOX) injection 40 mg, 40 mg, SubCUTAneous, Daily, Albaro Spencer MD, 40 mg at 10/09/21 0820    diphenhydrAMINE (BENADRYL) tablet 25 mg, 25 mg, Oral, Q6H PRN, Florinda Dandy, MD    zolpidem [facility] [place]) tablet 5 mg, 5 mg, Oral, Nightly PRN, Florinda Dandy, MD, 5 mg at 10/08/21 2223    HYDROcodone-acetaminophen (NORCO) 5-325 MG per tablet 1 tablet, 1 tablet, Oral, Q4H PRN, Kirk Choi MD, 1 tablet at 10/08/21 0602      I/O (24Hr):     Intake/Output Summary (Last 24 hours) at 10/9/2021 1459  Last data filed at 10/8/2021 2000  Gross per 24 hour   Intake 240 ml   Output    Net 240 ml       Data:           Labs:    Hematology:  Recent Labs     10/08/21  0535   WBC 5.7   RBC 5.14   HGB 14.0   HCT 42.8   MCV 83.3   MCH 27.2   MCHC 32.7   RDW 12.7      MPV 10.0     Chemistry:  Recent Labs     10/07/21  0502 10/08/21  0535 10/09/21  0541    137 136   K 4.3 4.3 4.8    106 107   CO2 22 21 21   GLUCOSE 98 95 101*   BUN 14 15 13   CREATININE 1.19 0.97 0.96   ANIONGAP 11 10 8*   LABGLOM >60 >60 >60   GFRAA >60 >60 >60   CALCIUM 8.7 8.6 8.5*     No results for input(s): PROT, LABALBU, LABA1C, Y5LBWTL, K6INYHX, FT4, TSH, AST, ALT, LDH, GGT, ALKPHOS, LABGGT, BILITOT, BILIDIR, AMMONIA, AMYLASE, LIPASE, LACTATE, CHOL, HDL, LDLCHOLESTEROL, CHOLHDLRATIO, TRIG, VLDL, VHO63PR, PHENYTOIN, PHENYF, URICACID, POCGLU in the last 72 hours. No results found for: SPECIAL  No results found for: CULTURE    No results found for: POCPH, PHART, PH, POCPCO2, YHJ0FDU, PCO2, POCPO2, PO2ART, PO2, POCHCO3, IGG1YDY, HCO3, NBEA, PBEA, BEART, BE, THGBART, THB, AOX1EOW, NUGE6NBY, H5EHDTIE, O2SAT, FIO2    Radiology:    CT Head WO Contrast    Result Date: 9/29/2021  No acute intracranial abnormality. CT CERVICAL SPINE WO CONTRAST    Result Date: 9/29/2021  No acute abnormality of the cervical spine. CT THORACIC SPINE WO CONTRAST    Result Date: 9/29/2021  1. No large disc herniation or protrusion by CT. 2. Other findings as described. CT Lumbar Spine WO Contrast    Result Date: 9/29/2021  1. No acute fracture. No listhesis. 2. Other findings as described. IR LUMBAR PUNCTURE FOR DIAGNOSIS    Result Date: 10/4/2021  Successful fluoroscopic-guided lumbar puncture. All radiological studies reviewed  Code Status:  Full Code        Electronically signed by Yusef Loja MD on 10/9/2021 at 2:59 PM    This note was created with the assistance of a speech-recognition program.  Although the intention is to generate a document that actually reflects the content of the visit, no guarantees can be provided that every mistake has been identified and corrected by editing. Note was updated later by me after  physical examination and  completion of the assessment.

## 2021-10-09 NOTE — PLAN OF CARE
Patients continuum of care needs are met  Description: Patients continuum of care needs are met  Outcome: Ongoing

## 2021-10-10 LAB
ABSOLUTE EOS #: 0.3 K/UL (ref 0–0.44)
ABSOLUTE IMMATURE GRANULOCYTE: 0.02 K/UL (ref 0–0.3)
ABSOLUTE LYMPH #: 1.95 K/UL (ref 1.1–3.7)
ABSOLUTE MONO #: 0.69 K/UL (ref 0.1–1.2)
ANION GAP SERPL CALCULATED.3IONS-SCNC: 11 MMOL/L (ref 9–17)
BASOPHILS # BLD: 1 % (ref 0–2)
BASOPHILS ABSOLUTE: 0.05 K/UL (ref 0–0.2)
BUN BLDV-MCNC: 18 MG/DL (ref 6–20)
BUN/CREAT BLD: 19 (ref 9–20)
CALCIUM IONIZED: 1.29 MMOL/L (ref 1.13–1.33)
CALCIUM SERPL-MCNC: 9 MG/DL (ref 8.6–10.4)
CHLORIDE BLD-SCNC: 106 MMOL/L (ref 98–107)
CO2: 22 MMOL/L (ref 20–31)
CREAT SERPL-MCNC: 0.96 MG/DL (ref 0.7–1.2)
DIFFERENTIAL TYPE: ABNORMAL
EOSINOPHILS RELATIVE PERCENT: 4 % (ref 1–4)
FIBRINOGEN: 186 MG/DL (ref 179–518)
GFR AFRICAN AMERICAN: >60 ML/MIN
GFR NON-AFRICAN AMERICAN: >60 ML/MIN
GFR SERPL CREATININE-BSD FRML MDRD: ABNORMAL ML/MIN/{1.73_M2}
GFR SERPL CREATININE-BSD FRML MDRD: ABNORMAL ML/MIN/{1.73_M2}
GLUCOSE BLD-MCNC: 100 MG/DL (ref 70–99)
HCT VFR BLD CALC: 40.5 % (ref 40.7–50.3)
HEMOGLOBIN: 13.4 G/DL (ref 13–17)
IMMATURE GRANULOCYTES: 0 %
LYMPHOCYTES # BLD: 29 % (ref 24–43)
MCH RBC QN AUTO: 27.5 PG (ref 25.2–33.5)
MCHC RBC AUTO-ENTMCNC: 33.1 G/DL (ref 28.4–34.8)
MCV RBC AUTO: 83.2 FL (ref 82.6–102.9)
MONOCYTES # BLD: 10 % (ref 3–12)
NRBC AUTOMATED: 0 PER 100 WBC
PDW BLD-RTO: 12.5 % (ref 11.8–14.4)
PLATELET # BLD: 176 K/UL (ref 138–453)
PLATELET ESTIMATE: ABNORMAL
PMV BLD AUTO: 10.1 FL (ref 8.1–13.5)
POTASSIUM SERPL-SCNC: 4.5 MMOL/L (ref 3.7–5.3)
RBC # BLD: 4.87 M/UL (ref 4.21–5.77)
RBC # BLD: ABNORMAL 10*6/UL
SEG NEUTROPHILS: 56 % (ref 36–65)
SEGMENTED NEUTROPHILS ABSOLUTE COUNT: 3.75 K/UL (ref 1.5–8.1)
SODIUM BLD-SCNC: 139 MMOL/L (ref 135–144)
WBC # BLD: 6.8 K/UL (ref 3.5–11.3)
WBC # BLD: ABNORMAL 10*3/UL

## 2021-10-10 PROCEDURE — 6360000002 HC RX W HCPCS: Performed by: PSYCHIATRY & NEUROLOGY

## 2021-10-10 PROCEDURE — 2060000000 HC ICU INTERMEDIATE R&B

## 2021-10-10 PROCEDURE — 6370000000 HC RX 637 (ALT 250 FOR IP): Performed by: PSYCHIATRY & NEUROLOGY

## 2021-10-10 PROCEDURE — 6360000002 HC RX W HCPCS: Performed by: FAMILY MEDICINE

## 2021-10-10 PROCEDURE — 99233 SBSQ HOSP IP/OBS HIGH 50: CPT | Performed by: PSYCHIATRY & NEUROLOGY

## 2021-10-10 PROCEDURE — 6370000000 HC RX 637 (ALT 250 FOR IP): Performed by: FAMILY MEDICINE

## 2021-10-10 PROCEDURE — 36516 APHERESIS IMMUNOADS SLCTV: CPT

## 2021-10-10 PROCEDURE — 36415 COLL VENOUS BLD VENIPUNCTURE: CPT

## 2021-10-10 PROCEDURE — 82330 ASSAY OF CALCIUM: CPT

## 2021-10-10 PROCEDURE — 2580000003 HC RX 258: Performed by: PSYCHIATRY & NEUROLOGY

## 2021-10-10 PROCEDURE — 85025 COMPLETE CBC W/AUTO DIFF WBC: CPT

## 2021-10-10 PROCEDURE — 80048 BASIC METABOLIC PNL TOTAL CA: CPT

## 2021-10-10 PROCEDURE — P9041 ALBUMIN (HUMAN),5%, 50ML: HCPCS | Performed by: PSYCHIATRY & NEUROLOGY

## 2021-10-10 PROCEDURE — 2580000003 HC RX 258: Performed by: FAMILY MEDICINE

## 2021-10-10 PROCEDURE — 85384 FIBRINOGEN ACTIVITY: CPT

## 2021-10-10 PROCEDURE — 6370000000 HC RX 637 (ALT 250 FOR IP): Performed by: HOSPITALIST

## 2021-10-10 RX ORDER — ALBUMIN, HUMAN INJ 5% 5 %
150 SOLUTION INTRAVENOUS ONCE
Status: COMPLETED | OUTPATIENT
Start: 2021-10-10 | End: 2021-10-10

## 2021-10-10 RX ORDER — HEPARIN SODIUM (PORCINE) LOCK FLUSH IV SOLN 100 UNIT/ML 100 UNIT/ML
500 SOLUTION INTRAVENOUS ONCE
Status: COMPLETED | OUTPATIENT
Start: 2021-10-10 | End: 2021-10-10

## 2021-10-10 RX ORDER — ALBUMIN, HUMAN INJ 5% 5 %
25 SOLUTION INTRAVENOUS ONCE
Status: DISCONTINUED | OUTPATIENT
Start: 2021-10-10 | End: 2021-10-10

## 2021-10-10 RX ORDER — HEPARIN SODIUM 5000 [USP'U]/ML
25000 INJECTION, SOLUTION INTRAVENOUS; SUBCUTANEOUS ONCE
Status: DISCONTINUED | OUTPATIENT
Start: 2021-10-10 | End: 2021-10-10

## 2021-10-10 RX ADMIN — IBUPROFEN 800 MG: 800 TABLET, FILM COATED ORAL at 20:31

## 2021-10-10 RX ADMIN — ALBUMIN (HUMAN) 150 G: 12.5 INJECTION, SOLUTION INTRAVENOUS at 09:08

## 2021-10-10 RX ADMIN — GABAPENTIN 600 MG: 300 CAPSULE ORAL at 15:07

## 2021-10-10 RX ADMIN — ALPRAZOLAM 0.25 MG: 0.25 TABLET ORAL at 09:05

## 2021-10-10 RX ADMIN — AMITRIPTYLINE HYDROCHLORIDE 25 MG: 25 TABLET, FILM COATED ORAL at 20:31

## 2021-10-10 RX ADMIN — GABAPENTIN 600 MG: 300 CAPSULE ORAL at 09:05

## 2021-10-10 RX ADMIN — IBUPROFEN 800 MG: 800 TABLET, FILM COATED ORAL at 09:05

## 2021-10-10 RX ADMIN — Medication 500 UNITS: at 09:07

## 2021-10-10 RX ADMIN — SODIUM CHLORIDE, PRESERVATIVE FREE 10 ML: 5 INJECTION INTRAVENOUS at 09:11

## 2021-10-10 RX ADMIN — CALCIUM GLUCONATE 2000 MG: 98 INJECTION, SOLUTION INTRAVENOUS at 09:09

## 2021-10-10 RX ADMIN — DOCUSATE SODIUM 100 MG: 100 CAPSULE, LIQUID FILLED ORAL at 09:05

## 2021-10-10 RX ADMIN — ENOXAPARIN SODIUM 40 MG: 40 INJECTION SUBCUTANEOUS at 09:07

## 2021-10-10 RX ADMIN — ZOLPIDEM TARTRATE 5 MG: 5 TABLET ORAL at 20:40

## 2021-10-10 RX ADMIN — FAMOTIDINE 20 MG: 20 TABLET, FILM COATED ORAL at 20:31

## 2021-10-10 RX ADMIN — FAMOTIDINE 20 MG: 20 TABLET, FILM COATED ORAL at 09:07

## 2021-10-10 RX ADMIN — SODIUM CHLORIDE, PRESERVATIVE FREE 10 ML: 5 INJECTION INTRAVENOUS at 20:36

## 2021-10-10 RX ADMIN — GABAPENTIN 600 MG: 300 CAPSULE ORAL at 20:30

## 2021-10-10 ASSESSMENT — PAIN SCALES - GENERAL
PAINLEVEL_OUTOF10: 8
PAINLEVEL_OUTOF10: 3
PAINLEVEL_OUTOF10: 0
PAINLEVEL_OUTOF10: 5
PAINLEVEL_OUTOF10: 0

## 2021-10-10 ASSESSMENT — PAIN DESCRIPTION - PAIN TYPE: TYPE: ACUTE PAIN

## 2021-10-10 ASSESSMENT — PAIN - FUNCTIONAL ASSESSMENT: PAIN_FUNCTIONAL_ASSESSMENT: PREVENTS OR INTERFERES SOME ACTIVE ACTIVITIES AND ADLS

## 2021-10-10 ASSESSMENT — PAIN DESCRIPTION - PROGRESSION
CLINICAL_PROGRESSION: NOT CHANGED
CLINICAL_PROGRESSION: NOT CHANGED

## 2021-10-10 ASSESSMENT — PAIN DESCRIPTION - RADICULAR PAIN: RADICULAR_PAIN: ABSENT

## 2021-10-10 ASSESSMENT — PAIN DESCRIPTION - LOCATION: LOCATION: HEAD

## 2021-10-10 ASSESSMENT — PAIN DESCRIPTION - ONSET: ONSET: ON-GOING

## 2021-10-10 ASSESSMENT — PAIN DESCRIPTION - FREQUENCY: FREQUENCY: CONTINUOUS

## 2021-10-10 ASSESSMENT — PAIN DESCRIPTION - DIRECTION: RADIATING_TOWARDS: DOWN LEGS

## 2021-10-10 ASSESSMENT — PAIN DESCRIPTION - ORIENTATION: ORIENTATION: RIGHT;MID

## 2021-10-10 ASSESSMENT — PAIN DESCRIPTION - DESCRIPTORS: DESCRIPTORS: HEADACHE

## 2021-10-10 NOTE — PROGRESS NOTES
[facility]. Pt verbalized abdominal pain , and requested prune juice.

## 2021-10-10 NOTE — PROGRESS NOTES
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 Low back pain . The condition is he walked 100 feet with rolling walker in PT . He had today third of 5 of plasma exchanges having tolerating this fine  . Elavil 25 mg po qhs was added to neurontin 600 mg po tid for painful leg paresthesias. He has bilateral left facial weakness with only minor left lower facial contraction none on on right  Partial ability to close both eyes . Mild left facial contraction . None on right . Deltoids 5-/5 , biceps 5-/5 . Iliopsas and hamstrings , ADF 4+/5 . Low back pain is partially attenuated with motrin 800 mg po tid . He is questioning need for rehabilitation. 50 yo male with Guillain [place] having received IV IgG 0.4 gram/kg qd x 5 days . He presents with 4 days of weakness of legs and leg numbness along with arm weakness and right facial weakness that developed in hospital. Head CT normal . CT cervical and thoracic spine normal . CT lumbar sine with left L5-S1 disc protrusion . CSF analysis today 4 WBC , 8 RBC , total protein 273 and glucose 60. He is complaining of low back pain with history of disc herniation . Significant medications Neurontin 600 mg po tid , motrin 800mg po tid. Testing  Head CT normal . CT cervical and thoracic spine normal . CT lumbar spine with left L5-S1 disc protrusion . CSF analysis today 4 WBC , 8 RBC , total protein 273 and glucose 60 . IgG 0.4 gram/kg qd x 5 days. FU Head CT normal      Past Medical History:   Diagnosis Date    Herniation of intervertebral disc between L5 and S1     Low back pain     Sciatica        Past Surgical History:   Procedure Laterality Date    HERNIA REPAIR Right     inguinal    IR NONTUNNELED VASCULAR CATHETER  10/5/2021    IR NONTUNNELED VASCULAR CATHETER 10/5/2021 MD MARGARITO Evangelista SPECIAL PROCEDURES       History reviewed. No pertinent family history.     Social History     Socioeconomic History    Marital status:      Spouse name: None    Number of children: None    Years of education: None    Highest education level: None   Occupational History    None   Tobacco Use    Smoking status: Former Smoker     Quit date: 2014     Years since quittin.0    Smokeless tobacco: Never Used   Substance and Sexual Activity    Alcohol use: Not Currently    Drug use: Not Currently    Sexual activity: None   Other Topics Concern    None   Social History Narrative    None     Social Determinants of Health     Financial Resource Strain:     Difficulty of Paying Living Expenses:    Food Insecurity:     Worried About Running Out of Food in the Last Year:     Ran Out of Food in the Last Year:    Transportation Needs:     Lack of Transportation (Medical):      Lack of Transportation (Non-Medical):    Physical Activity:     Days of Exercise per Week:     Minutes of Exercise per Session:    Stress:     Feeling of Stress :    Social Connections:     Frequency of Communication with Friends and Family:     Frequency of Social Gatherings with Friends and Family:     Attends Rastafarian Services:     Active Member of Clubs or Organizations:     Attends Club or Organization Meetings:     Marital Status:    Intimate Partner Violence:     Fear of Current or Ex-Partner:     Emotionally Abused:     Physically Abused:     Sexually Abused:        Current Facility-Administered Medications   Medication Dose Route Frequency Provider Last Rate Last Admin    amitriptyline (ELAVIL) tablet 25 mg  25 mg Oral Nightly Clark Payne MD   25 mg at 10/09/21 2024    gabapentin (NEURONTIN) capsule 600 mg  600 mg Oral TID Clark Payne MD   600 mg at 10/10/21 0905    calcium gluconate 2,000 mg in sodium chloride 0.9 % 100 mL IVPB  2,000 mg IntraVENous Once Clark Payne MD        albumin human 5 % IV solution 200 g  200 g IntraVENous Once Clark Payne MD        heparin 40 mg SubCUTAneous Daily Albaro Spencer MD   40 mg at 10/10/21 5448    diphenhydrAMINE (BENADRYL) tablet 25 mg  25 mg Oral Q6H PRN Araceli Taylor MD        zolpidem [facility] [place]) tablet 5 mg  5 mg Oral Nightly PRN Araceli Taylor MD   5 mg at 10/09/21 2225    HYDROcodone-acetaminophen ([address]) 5-325 MG per tablet 1 tablet  1 tablet Oral Q4H PRN Ana M Person MD   1 tablet at 10/08/21 0602       No Known Allergies    ROS:   Constitutional                  Negative for fever and chills   HEENT                            Negative for ear discharge, ear pain, nosebleed  Eyes                                Negative for photophobia, pain and discharge  Respiratory                      Negative for hemoptysis and sputum  Cardiovascular                Negative for orthopnea, claudication and PND  Gastrointestinal               Negative for abdominal pain, diarrhea, blood in stool  Musculoskeletal               Positive low back pain   Skin                                 Negative for rash or itching  Endo/heme/allergies       Negative for polydipsia, environmental allergy  Psychiatric                       Negative for suicidal ideation. Patient is not anxious    Vitals:    10/10/21 0815   BP:    Pulse:    Resp:    Temp: 97.9 °F (36.6 °C)   SpO2:      Admission weight: 200 lb (90.7 kg)    Neurological Examination  Constitutional .General exam well groomed   Head/ Ears /Nose/Throat/external ear . Normal exam  Neck and thyroid . Normal size. No bruits  Respiratory . Breathsounds clear bilaterally  Cardiovascular: Auscultation of heart with regular rate and rhythm   Musculoskeletal. Muscle bulk and tone normal                                                           Muscle strength deltoids 5-/5 , biceps 5-/5 .  Iliopsoas , hamstrings and ADF 4+/5 otherwise 5/5 strength throughout                                                                                No dysmetria or dysdiadokinesis  No tremor   Normal fine motor  Orientation Alert and oriented x 3   Attention and concentration normal  Short term memory normal  Language process and speech normal . No aphasia   Cranial nerve 2 normal acuety and visual fields  Cranial nerve 3, 4 and 6 . Extraocular muscles are intact . Pupils are equal and reactive   Cranial nerve 5 . Normal strength of masseter and temporalis . Intact corneal reflex. Normal facial sensation  Cranial nerve 7 Minor left lower facial contraction none on on right  Partial ability to close both eyes . Mild left lower facial contraction . None on right lower facial weakness . Decrease resistance eye closure bilaterally . Decrease right frontalis contraction . None left   Cranial nerve 8. Grossly intact hearing   Cranial nerve 9 and 10. Symmetric palate elevation   Cranial nerve 11 , 5 out of 5 strength   Cranial Nerve 12 midline tongue . 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 Low back pain     Plan:    Continue plasma exchange day 4 of 5 on Tuesday.  PMR consultation

## 2021-10-10 NOTE — PROGRESS NOTES
[address] Hospitalist        10/10/2021   3:43 PM    Name:  Sadie Loyola  MRN:    [id number]     Acct:     [de-identified]   Room:  2041/2041-01  IP Day: 8     Admit Date: 9/29/2021  8:10 PM  PCP: James Bunch MD    C/C:   Chief Complaint   Patient presents with    Extremity Weakness    Hand Numbness       Assessment:      · Guillain Sturbridge syndrome  · Upper and lower extremity weakness/Paresthesias  · Worsening left-sided facial droop  · Lumbar arthritis/Low back pain  · Lumbar herniated disc  · Acute hypoxic respitratory failure  · Fall  · Essential Hypertension  · Mixed hyperlipidemia   · Constipation    · GERD without esophagitis   · Anxiety disorder  · Insomnia       Plan:        · Patient is currently in ICU  · Altered mental status General 92%  · Neurochecks per protocol  · S/p IVIG x5  · Plasmapheresis x5, started on 10/6/2021  · Speech evaluation  · CT brain no acute changes  · CT abdomen and pelvis without contrast no acute changes  · Neurology following  · DVT and GI prophylaxis  · Continue to monitor/telemetry/CBC with differential daily/BMP daily  · Continue medications as below    PMNR consult    Scheduled Meds:   amitriptyline  25 mg Oral Nightly    gabapentin  600 mg Oral TID    calcium gluconate IVPB  2,000 mg IntraVENous Once    albumin human  200 g IntraVENous Once    heparin (porcine)  15,000 Units IntraCATHeter Once    amLODIPine  10 mg Oral Daily    docusate sodium  100 mg Oral Daily    famotidine  20 mg Oral BID    sodium chloride flush  10 mL IntraVENous 2 times per day    enoxaparin  40 mg SubCUTAneous Daily     Continuous Infusions:   sodium chloride 25 mL (10/02/21 1339)     PRN Meds:  ibuprofen, 800 mg, Q8H PRN  acetaminophen, 650 mg, Q4H PRN  calcium gluconate IVPB, 1,000 mg, PRN  hydrALAZINE, 10 mg, Q6H PRN  ALPRAZolam, 0.25 mg, BID PRN  senna, 1 tablet, BID PRN  aluminum & magnesium hydroxide-simethicone, 30 mL, TID PRN  sodium chloride flush, 10 mL, PRN  sodium chloride, 25 mL, PRN  ondansetron, 4 mg, Q8H PRN   Or  ondansetron, 4 mg, Q6H PRN  magnesium hydroxide, 30 mL, Daily PRN  diphenhydrAMINE, 25 mg, Q6H PRN  zolpidem, 5 mg, Nightly PRN  HYDROcodone 5 mg - acetaminophen, 1 tablet, Q4H PRN            Subjective:     Patient seen and examined at bedside and reviewed  last 24 hrs events with nursing staff  No acute events overnight. Patient denies any acute complaints. Afebrile  Patient denies any chest pain, shortness of Breath, palpitation, headache, dizziness, cough, nausea, vomiting, abdominal pain, pain, changes in urination or bowel habit or rash. Patient still continues to have neurological symptoms continued on plasmapheresis        ROS:  A 10 point system reviewed and negative otherwise mentioned above. Physical Examination:      Vitals:  BP (!) 137/103   Pulse 86   Temp 98.1 °F (36.7 °C) (Oral)   Resp 16   Ht 6' (1.829 m)   Wt 200 lb (90.7 kg)   SpO2 97%   BMI 27.12 kg/m²   Temp (24hrs), Av.8 °F (36.6 °C), Min:97.3 °F (36.3 °C), Max:98.5 °F (36.9 °C)    Weight:   Wt Readings from Last 3 Encounters:   10/09/21 200 lb (90.7 kg)     I/O last 3 completed shifts:  No intake/output data recorded. No results for input(s): POCGLU in the last 72 hours.       General appearance - alert, well appearing, and in no acute distress  Mental status - oriented to person, place, and time with normal affect  Head - normocephalic and atraumatic  Eyes - pupils equal and reactive, extraocular eye movements intact, conjunctiva clear  Ears - hearing appears to be intact  Nose - no drainage noted  Mouth - mucous membranes moist  Neck - supple, no carotid bruits, thyroid not palpable  Chest - clear to auscultation, normal effort  Heart - normal rate, regular rhythm, no murmur  Abdomen - soft, nontender, nondistended, bowel sounds present all four quadrants, no masses, hepatomegaly or splenomegaly  Neurological - normal speech, no focal findings or movement disorder noted, cranial nerves II through XII grossly intact  Extremities - peripheral pulses palpable, no pedal edema or calf pain with palpation  Skin - no gross lesions, rashes, or induration noted        Medications:      Allergies: No Known Allergies    Current Meds:   Current Facility-Administered Medications:     amitriptyline (ELAVIL) tablet 25 mg, 25 mg, Oral, Nightly, Isa Ang MD, 25 mg at 10/09/21 2024    gabapentin (NEURONTIN) capsule 600 mg, 600 mg, Oral, TID, Isa Ang MD, 600 mg at 10/10/21 1507    calcium gluconate 2,000 mg in sodium chloride 0.9 % 100 mL IVPB, 2,000 mg, IntraVENous, Once, Isa Ang MD    albumin human 5 % IV solution 200 g, 200 g, IntraVENous, Once, Isa Ang MD    heparin (porcine) injection 15,000 Units, 15,000 Units, IntraCATHeter, Once, Isa Ang MD    ibuprofen (ADVIL;MOTRIN) tablet 800 mg, 800 mg, Oral, Q8H PRN, Tadeo Aviles MD, 800 mg at 10/10/21 0905    acetaminophen (TYLENOL) tablet 650 mg, 650 mg, Oral, Q4H PRN, Al Lai MD    calcium gluconate 1,000 mg in sodium chloride 0.9 % 100 mL IVPB, 1,000 mg, IntraVENous, PRN, Isa Ang MD    amLODIPine (NORVASC) tablet 10 mg, 10 mg, Oral, Daily, Albaro Spencer MD, 10 mg at 10/09/21 0819    hydrALAZINE (APRESOLINE) injection 10 mg, 10 mg, IntraVENous, Q6H PRN, Serene Pizano MD, 10 mg at 10/05/21 0219    ALPRAZolam (XANAX) tablet 0.25 mg, 0.25 mg, Oral, BID PRN, Serene Pizano MD, 0.25 mg at 10/10/21 0905    senna (SENOKOT) tablet 8.6 mg, 1 tablet, Oral, BID PRN, Serene Pizano MD, 8.6 mg at 10/08/21 2223    docusate sodium (COLACE) capsule 100 mg, 100 mg, Oral, Daily, Albaro Spencer MD, 100 mg at 10/10/21 0905    aluminum & magnesium hydroxide-simethicone (MAALOX) 200-200-20 MG/5ML suspension 30 mL, 30 mL, Oral, TID PRN, Albaro Spencer MD, 30 mL at 10/04/21 1736    famotidine (PEPCID) tablet 20 mg, 20 mg, Oral, BID, Albaro Spencer MD, 20 mg at 10/10/21 0907    sodium chloride flush 0.9 % injection 10 mL, 10 mL, IntraVENous, 2 times per day, Shamar Decker MD, 10 mL at 10/10/21 0911    sodium chloride flush 0.9 % injection 10 mL, 10 mL, IntraVENous, PRN, Albaro Spencer MD    0.9 % sodium chloride infusion, 25 mL, IntraVENous, PRN, Albaro Spencer MD, Last Rate: 100 mL/hr at 10/02/21 1339, 25 mL at 10/02/21 1339    ondansetron (ZOFRAN-ODT) disintegrating tablet 4 mg, 4 mg, Oral, Q8H PRN, 4 mg at 09/30/21 1514 **OR** ondansetron (ZOFRAN) injection 4 mg, 4 mg, IntraVENous, Q6H PRN, Albaro Spencer MD, 4 mg at 10/03/21 0433    magnesium hydroxide (MILK OF MAGNESIA) 400 MG/5ML suspension 30 mL, 30 mL, Oral, Daily PRN, Albaro Spencer MD, 30 mL at 10/08/21 1102    enoxaparin (LOVENOX) injection 40 mg, 40 mg, SubCUTAneous, Daily, Albaro Spencer MD, 40 mg at 10/10/21 0907    diphenhydrAMINE (BENADRYL) tablet 25 mg, 25 mg, Oral, Q6H PRN, Randy Duncan MD    zolpidem [facility]) tablet 5 mg, 5 mg, Oral, Nightly PRN, Randy Duncan MD, 5 mg at 10/09/21 2225    HYDROcodone-acetaminophen (NORCO) 5-325 MG per tablet 1 tablet, 1 tablet, Oral, Q4H PRN, Shamar Decker MD, 1 tablet at 10/08/21 0602      I/O (24Hr):   No intake or output data in the 24 hours ending 10/10/21 1543    Data:           Labs:    Hematology:  Recent Labs     10/08/21  0535 10/10/21  0504   WBC 5.7 6.8   RBC 5.14 4.87   HGB 14.0 13.4   HCT 42.8 40.5*   MCV 83.3 83.2   MCH 27.2 27.5   MCHC 32.7 33.1   RDW 12.7 12.5    176   MPV 10.0 10.1     Chemistry:  Recent Labs     10/08/21  0535 10/09/21  0541 10/10/21  0504    136 139   K 4.3 4.8 4.5    107 106   CO2 21 21 22   GLUCOSE 95 101* 100*   BUN 15 13 18   CREATININE 0.97 0.96 0.96   ANIONGAP 10 8* 11   LABGLOM >60 >60 >60   GFRAA >60 >60 >60   CALCIUM 8.6 8.5* 9.0   CAION  --   --  1.29     No results for input(s): PROT, LABALBU, LABA1C, H8QCFIF, H9VZXDR, FT4, TSH, AST, ALT, LDH, GGT, ALKPHOS, LABGGT, BILITOT, BILIDIR, AMMONIA, AMYLASE, LIPASE, LACTATE, CHOL, HDL, LDLCHOLESTEROL, CHOLHDLRATIO, TRIG, VLDL, DEI27JB, PHENYTOIN, PHENYF, URICACID, POCGLU in the last 72 hours. No results found for: SPECIAL  No results found for: CULTURE    No results found for: POCPH, PHART, PH, POCPCO2, YBF0HFZ, PCO2, POCPO2, PO2ART, PO2, POCHCO3, EIT5YLD, HCO3, NBEA, PBEA, BEART, BE, THGBART, THB, PXT3UKE, JGZC6ZAZ, M4COVFVS, O2SAT, FIO2    Radiology:    CT Head WO Contrast    Result Date: 9/29/2021  No acute intracranial abnormality. CT CERVICAL SPINE WO CONTRAST    Result Date: 9/29/2021  No acute abnormality of the cervical spine. CT THORACIC SPINE WO CONTRAST    Result Date: 9/29/2021  1. No large disc herniation or protrusion by CT. 2. Other findings as described. CT Lumbar Spine WO Contrast    Result Date: 9/29/2021  1. No acute fracture. No listhesis. 2. Other findings as described. IR LUMBAR PUNCTURE FOR DIAGNOSIS    Result Date: 10/4/2021  Successful fluoroscopic-guided lumbar puncture. All radiological studies reviewed  Code Status:  Full Code        Electronically signed by Nuha Mock MD on 10/10/2021 at 3:43 PM    This note was created with the assistance of a speech-recognition program.  Although the intention is to generate a document that actually reflects the content of the visit, no guarantees can be provided that every mistake has been identified and corrected by editing. Note was updated later by me after  physical examination and  completion of the assessment.

## 2021-10-10 NOTE — PLAN OF CARE
Problem: Falls - Risk of:  Goal: Will remain free from falls  Description: Will remain free from falls  10/10/2021 0041 by Yeny Matos RN  Outcome: Met This Shift  Goal: Absence of physical injury  Description: Absence of physical injury  Outcome: Met This Shift     Problem: Safety:  Goal: Free from accidental physical injury  Description: Free from accidental physical injury  10/10/2021 0041 by Yeny Matos RN  Outcome: Met This Shift  Goal: Free from intentional harm  Description: Free from intentional harm  Outcome: Met This Shift  Goal: Ability to chew and swallow food without choking will improve  Description: Ability to chew and swallow food without choking will improve  Outcome: Met This Shift  Goal: Ability to demonstrate good, daily oral hygiene techniques will improve  Description: Ability to demonstrate good, daily oral hygiene techniques will improve  Outcome: Met This Shift  Goal: Maintenance of upright position during and after feeding  Description: Maintenance of upright position during and after feeding  Outcome: Met This Shift     Problem: Skin Integrity:  Goal: Skin integrity will stabilize  Description: Skin integrity will stabilize  10/10/2021 0041 by Yeny Matos RN  Outcome: Met This Shift

## 2021-10-10 NOTE — PROGRESS NOTES
Patient resting comfortably in bed with plasma exchange running. HR: 78bpm  SpO2: 98%  RR: 16 normal  NIF: >-40 cmH2O  Will continue to monitor.

## 2021-10-10 NOTE — PROGRESS NOTES
Physical Therapy  DATE: 10/10/2021    NAME: John Cervantes  MRN: [id number]   : 1975    Patient not seen this date for Physical Therapy due to:      [] Cancel by RN or physician due to:    [] Hemodialysis    [] Critical Lab Value Level     [x] Blood transfusion in progress Per RN Keri Gardner    [] Acute or unstable cardiovascular status   _MAP < 55 or more than >115  _HR < 40 or > 130    [] Acute or unstable pulmonary status   -FiO2 > 60%   _RR < 5 or >40    _O2 sats < 85%    [] Strict Bedrest    [] Off Unit for surgery or procedure    [] Off Unit for testing       [] Pending imaging to R/O fracture    [] Refusal by Patient      [] Other      [] PT being discontinued at this time. Patient independent. No further needs. [] PT being discontinued at this time as the patient has been transferred to hospice care. No further needs.       Suzi Holguin, PTA

## 2021-10-11 LAB
ANION GAP SERPL CALCULATED.3IONS-SCNC: 10 MMOL/L (ref 9–17)
BUN BLDV-MCNC: 15 MG/DL (ref 6–20)
BUN/CREAT BLD: 15 (ref 9–20)
CALCIUM SERPL-MCNC: 8.6 MG/DL (ref 8.6–10.4)
CHLORIDE BLD-SCNC: 103 MMOL/L (ref 98–107)
CO2: 22 MMOL/L (ref 20–31)
CREAT SERPL-MCNC: 1.01 MG/DL (ref 0.7–1.2)
GFR AFRICAN AMERICAN: >60 ML/MIN
GFR NON-AFRICAN AMERICAN: >60 ML/MIN
GFR SERPL CREATININE-BSD FRML MDRD: ABNORMAL ML/MIN/{1.73_M2}
GFR SERPL CREATININE-BSD FRML MDRD: ABNORMAL ML/MIN/{1.73_M2}
GLUCOSE BLD-MCNC: 103 MG/DL (ref 70–99)
POTASSIUM SERPL-SCNC: 4.4 MMOL/L (ref 3.7–5.3)
SODIUM BLD-SCNC: 135 MMOL/L (ref 135–144)

## 2021-10-11 PROCEDURE — 6360000002 HC RX W HCPCS: Performed by: FAMILY MEDICINE

## 2021-10-11 PROCEDURE — 6370000000 HC RX 637 (ALT 250 FOR IP): Performed by: HOSPITALIST

## 2021-10-11 PROCEDURE — 97530 THERAPEUTIC ACTIVITIES: CPT

## 2021-10-11 PROCEDURE — 80048 BASIC METABOLIC PNL TOTAL CA: CPT

## 2021-10-11 PROCEDURE — 97112 NEUROMUSCULAR REEDUCATION: CPT

## 2021-10-11 PROCEDURE — 2580000003 HC RX 258: Performed by: FAMILY MEDICINE

## 2021-10-11 PROCEDURE — 6370000000 HC RX 637 (ALT 250 FOR IP): Performed by: FAMILY MEDICINE

## 2021-10-11 PROCEDURE — 97116 GAIT TRAINING THERAPY: CPT

## 2021-10-11 PROCEDURE — 2060000000 HC ICU INTERMEDIATE R&B

## 2021-10-11 PROCEDURE — 97535 SELF CARE MNGMENT TRAINING: CPT

## 2021-10-11 PROCEDURE — 36415 COLL VENOUS BLD VENIPUNCTURE: CPT

## 2021-10-11 PROCEDURE — 99232 SBSQ HOSP IP/OBS MODERATE 35: CPT | Performed by: PSYCHIATRY & NEUROLOGY

## 2021-10-11 PROCEDURE — 6370000000 HC RX 637 (ALT 250 FOR IP): Performed by: PSYCHIATRY & NEUROLOGY

## 2021-10-11 RX ADMIN — GABAPENTIN 600 MG: 300 CAPSULE ORAL at 14:16

## 2021-10-11 RX ADMIN — SODIUM CHLORIDE, PRESERVATIVE FREE 10 ML: 5 INJECTION INTRAVENOUS at 22:10

## 2021-10-11 RX ADMIN — SODIUM CHLORIDE, PRESERVATIVE FREE 10 ML: 5 INJECTION INTRAVENOUS at 08:25

## 2021-10-11 RX ADMIN — AMLODIPINE BESYLATE 10 MG: 10 TABLET ORAL at 08:23

## 2021-10-11 RX ADMIN — ENOXAPARIN SODIUM 40 MG: 40 INJECTION SUBCUTANEOUS at 08:23

## 2021-10-11 RX ADMIN — DOCUSATE SODIUM 100 MG: 100 CAPSULE, LIQUID FILLED ORAL at 08:23

## 2021-10-11 RX ADMIN — FAMOTIDINE 20 MG: 20 TABLET, FILM COATED ORAL at 22:09

## 2021-10-11 RX ADMIN — IBUPROFEN 800 MG: 800 TABLET, FILM COATED ORAL at 08:30

## 2021-10-11 RX ADMIN — GABAPENTIN 600 MG: 300 CAPSULE ORAL at 08:23

## 2021-10-11 RX ADMIN — GABAPENTIN 600 MG: 300 CAPSULE ORAL at 22:09

## 2021-10-11 RX ADMIN — FAMOTIDINE 20 MG: 20 TABLET, FILM COATED ORAL at 08:23

## 2021-10-11 RX ADMIN — ZOLPIDEM TARTRATE 5 MG: 5 TABLET ORAL at 22:09

## 2021-10-11 ASSESSMENT — PAIN DESCRIPTION - PROGRESSION

## 2021-10-11 ASSESSMENT — PAIN SCALES - GENERAL
PAINLEVEL_OUTOF10: 2
PAINLEVEL_OUTOF10: 7

## 2021-10-11 ASSESSMENT — PAIN DESCRIPTION - LOCATION: LOCATION: HEAD

## 2021-10-11 ASSESSMENT — PAIN DESCRIPTION - FREQUENCY: FREQUENCY: CONTINUOUS

## 2021-10-11 ASSESSMENT — PAIN DESCRIPTION - ONSET: ONSET: ON-GOING

## 2021-10-11 ASSESSMENT — PAIN DESCRIPTION - DESCRIPTORS: DESCRIPTORS: HEADACHE

## 2021-10-11 NOTE — PROGRESS NOTES
This is a 50 y/o previously healthy WM who presented with 4 days of progressive numbness and weakness following a viral illness about 2 week prior. He was immediately initiated on IVIG and although there were some improvements in lower extremity symptoms and function he went on to develop bilateral facial weakness. As a result of this he was started on PLEX. He has not developed any new symptoms over the last 5 days and has been improving with respect to his ambulation. He has two more exchanges to go. Subjective:  He reports he is ambulating better. He still has dysesthesias in his hands and proprioceptive issues with his walking. Blood pressures have also been relatively high. Past Medical History:   Diagnosis Date    Herniation of intervertebral disc between L5 and S1     Low back pain     Sciatica        Past Surgical History:   Procedure Laterality Date    HERNIA REPAIR Right     inguinal    IR NONTUNNELED VASCULAR CATHETER  10/5/2021    IR NONTUNNELED VASCULAR CATHETER 10/5/2021 MD MARGARITO [facility] PROC[facility]URES       History reviewed. No pertinent family history.     Social History     Socioeconomic History    Marital status:      Spouse name: None    Number of children: None    Years of education: None    Highest education level: None   Occupational History    None   Tobacco Use    Smoking status: Former Smoker     Quit date: 2014     Years since quittin.0    Smokeless tobacco: Never Used   Substance and Sexual Activity    Alcohol use: Not Currently    Drug use: Not Currently    Sexual activity: None   Other Topics Concern    None   Social History Narrative    None     Social Determinants of Health     Financial Resource Strain:     Difficulty of Paying Living Expenses:    Food Insecurity:     Worried About Running Out of Food in the Last Year:     Ran Out of Food in the Last Year:    Transportation Needs:     Lack of Transportation (Medical):    [facility] [place] MD   100 mg at 10/11/21 0823    aluminum & magnesium hydroxide-simethicone (MAALOX) 200-200-20 MG/5ML suspension 30 mL  30 mL Oral TID PRN Albaro Spencer MD   30 mL at 10/04/21 1736    famotidine (PEPCID) tablet 20 mg  20 mg Oral BID [place]o Park MD   20 mg at 10/11/21 0823    sodium chloride flush 0.9 % injection 10 mL  10 mL IntraVENous 2 times per day Polo Park MD   10 mL at 10/11/21 0825    sodium chloride flush 0.9 % injection 10 mL  10 mL IntraVENous PRN Albaro Spencer MD        0.9 % sodium chloride infusion  25 mL IntraVENous PRN Albaro Spencer  mL/hr at 10/02/21 1339 25 mL at 10/02/21 1339    ondansetron (ZOFRAN-ODT) disintegrating tablet 4 mg  4 mg Oral Q8H PRN Albaro Spencer MD   4 mg at 09/30/21 1514    Or    ondansetron (ZOFRAN) injection 4 mg  4 mg IntraVENous Q6H PRN Albaro Spencer MD   4 mg at 10/03/21 0433    magnesium hydroxide (MILK OF MAGNESIA) 400 MG/5ML suspension 30 mL  30 mL Oral Daily PRN Albaro Spencer MD   30 mL at 10/08/21 1102    enoxaparin (LOVENOX) injection 40 mg  40 mg SubCUTAneous Daily Albaro Spencer MD   40 mg at 10/11/21 0823    diphenhydrAMINE (BENADRYL) tablet 25 mg  25 mg Oral Q6H PRN Hazel Mercado MD        zolpidem [facility]) tablet 5 mg  5 mg Oral Nightly PRN Hazel Mercado MD   5 mg at 10/10/21 2040    HYDROcodone-acetaminophen (NORCO) 5-325 MG per tablet 1 tablet  1 tablet Oral Q4H PRN Albaro Spencer MD   1 tablet at 10/08/21 0602       No Known Allergies        Vitals:    10/11/21 0800   BP: (!) 145/107   Pulse: 76   Resp: 16   Temp: 97.7 °F (36.5 °C)   SpO2: 93%     Admission weight: 200 lb (90.7 kg)    General Appearance:  NAD  Mental Status Exam:             Level of Alertness:   awake            Orientation:   person, place, time            Memory:   normal            Fund of Knowledge:  normal            Attention/Concentration:  normal            Language:  normal        Cranial Nerves     PERRL, EOMI/F, no ptosis, Bilateral facial weakness, eye closure weak but he completely covers sclera.          Motor:    Deltoids: 5/5  Triceps: 5/5  biceps: 5/5  HF: 5-/5  KE: 5/5  DF: 5/5     Vibratory sense present but decreased in feet  areflexic      Assessment : This is a 52 y/o with 4 days of progressive and ascending sensory complaints and weakness which occurred 2 weeks after a viral syndrome. His exam was initially significant for proximal upper extremity weakness and bilateral facial palsy but his appendicular weakness has completely resolved. I expect his facial weakness will start to improve shortly as well. It is clear that he has reached the michelle of his disease and now it is just a matter of time for improvement            Plan:    1. Will d/c elavil due to constipation and ineffectiveness  2. D/c NIFs  3. OK from my standpoint to be transferred to med surg. In most hospitals PLEX can be performed there  4. Continue PLEX x 2  5.  Will defer need for additional hypertensive management to hospitalist.

## 2021-10-11 NOTE — DISCHARGE INSTR - COC
Continuity of Care Form    Patient Name: Chanelle Mohan   :    MRN:  [id number]    Admit date:  2021  Discharge date:  10/14/2021    Code Status Order: Full Code   Advance Directives:      Admitting Physician:  Delmer Mendez MD  PCP: Rosa Molina MD    Discharging Nurse: [facility] Unit/Room#: 8369/0386-09  Discharging Unit Phone Number: [phone]    Emergency Contact:   Extended Emergency Contact Information  Primary Emergency Contact: Leela Santos  Mobile Phone: [phone]  Relation: Spouse  Preferred language: English   needed?  No    Past Surgical History:  Past Surgical History:   Procedure Laterality Date    HERNIA REPAIR Right     inguinal    IR NONTUNNELED VASCULAR CATHETER  10/5/2021    IR NONTUNNELED VASCULAR CATHETER 10/5/2021 MD MARGARITO [facility]dz SPECIAL PROCEDURES       Immunization History:   Immunization History   Administered Date(s) Administered    COVID-19, Moderna, PF, 100mcg/0.5mL 2021, 2021       Active Problems:  Patient Active Problem List   Diagnosis Code    Paresthesia of both lower extremities R20.2    [facility] G61.0       Isolation/Infection:   Isolation            No Isolation          Patient Infection Status       Infection Onset Added Last Indicated Last Indicated By Review Planned Expiration Resolved Resolved By    None active    Resolved    COVID-19 Rule Out 21 COVID-19, Rapid (Ordered)   21 Rule-Out Test Resulted            Nurse Assessment:  Last Vital Signs: BP (!) 139/103   Pulse 74   Temp 97.2 °F (36.2 °C) (Temporal)   Resp 14   Ht 6' (1.829 m)   Wt 200 lb (90.7 kg)   SpO2 91%   BMI 27.12 kg/m²     Last documented pain score (0-10 scale): Pain Level: 7  Last Weight:   Wt Readings from Last 1 Encounters:   10/09/21 200 lb (90.7 kg)     Mental Status:  alert    IV Access:  - None    Nursing Mobility/ADLs:  Walking   Independent  Transfer Independent  Bathing  Independent  Dressing  Independent  Toileting  Independent  Feeding  Independent  Med [address]  Independent  Med Delivery   whole    Wound Care Documentation and Therapy:        Elimination:  Continence: Bowel: Yes  Bladder: Yes  Urinary Catheter: None   Colostomy/Ileostomy/Ileal Conduit: No       Date of Last BM: 10/13/2021  No intake or output data in the 24 hours ending 10/11/21 1930  No intake/output data recorded. Safety Concerns:     None    Impairments/Disabilities:      None    Nutrition Therapy:  Current Nutrition Therapy:   - Oral Diet:  General    Routes of Feeding: Oral  Liquids: Thin Liquids  Daily Fluid Restriction: no  Last Modified Barium Swallow with Video (Video Swallowing Test): not done    Treatments at the Time of Hospital Discharge:   Respiratory Treatments:   Oxygen Therapy:  is not on home oxygen therapy. Ventilator:    - No ventilator support    Rehab Therapies: Physical Therapy and Occupational Therapy  Weight Bearing Status/Restrictions: No weight bearing restirctions  Other Medical Equipment (for information only, NOT a DME order):  walker  Other Treatments: Skilled RN assessment    Patient's personal belongings (please select all that are sent with patient):  Glasses    RN SIGNATURE:  Electronically signed by Alisa Gleason RN on 10/14/21 at 2:02 PM EDT    CASE MANAGEMENT/SOCIAL WORK SECTION    Inpatient Status Date: ***    Readmission Risk Assessment Score:  Readmission Risk              Risk of Unplanned Readmission:  8           Discharging to Facility/ Agency   Name:   [facility] Details  FAX            [address] [place] [address]       Phone: [phone]       Fax: [phone]            / signature: Electronically signed by Thi Duggan RN on 10/14/21 at 8:53 AM EDT    PHYSICIAN SECTION    Prognosis: Fair    Condition at Discharge: Stable    Rehab Potential (if transferring to Rehab): Fair    Recommended Labs or Other Treatments After Discharge:     Physician Certification: I certify the above information and transfer of Marilin oLuis  is necessary for the continuing treatment of the diagnosis listed and that he requires Home Care for greater 30 days.      Update Admission H&P: No change in H&P    PHYSICIAN SIGNATURE:  Electronically signed by Shaamr Decker MD on 10/14/21 at 2:26 PM EDT

## 2021-10-11 NOTE — PLAN OF CARE
Pt ambulating without safety concern. Strong swallow with food, drinks and meds. Pt bathed and brushed teeth independently.  Pt reports mild pain controlled with Motrin at or below pain goal.

## 2021-10-11 NOTE — PROGRESS NOTES
has a past medical history of Herniation of intervertebral disc between L5 and S1, Low back pain, and Sciatica. has a past surgical history that includes hernia repair (Right) and IR NONTUNNELED VASCULAR CATHETER > 5 YEARS (10/5/2021). Restrictions  Restrictions/Precautions  Restrictions/Precautions: General Precautions, Fall Risk  Required Braces or Orthoses?: No  Position Activity Restriction  Other position/activity restrictions: OF NOTE: NEW DX OF GUILLAIN-BARRE SYNDROME  Subjective   General  Chart Reviewed: Yes  Patient assessed for rehabilitation services?: Yes  Additional Pertinent Hx: Upon arrival to room, pt in bed and agreeable for treatment. Response to previous treatment: Patient with no complaints from previous session  Family / Caregiver Present: No  General Comment  Comments: \"I feel OK today. .. [place] I just want to get better and go home! \"      Orientation  Orientation  Overall Orientation Status: Within Normal Limits  Objective    ADL  Grooming: Setup; Independent (Pt. completed grooming task from set up on tray independently while sitting unsupported at EOB.)        Balance  Sitting Balance: Independent  Standing Balance: Stand by assistance  Standing Balance  Time: standing tolerance 5-6 min for ADL task. Functional Mobility  Functional - Mobility Device: Rolling Walker  Toilet Transfers  Toilet - Technique: Ambulating  Equipment Used: Standard toilet  Toilet Transfer: Supervision (with use of RW)  Bed mobility  Bridging: Independent  Rolling to Left: Independent  Rolling to Right: Independent  Supine to Sit: Independent  Sit to Supine: Independent  Scooting: Independent  Transfers  Stand Step Transfers: Stand by assistance  Stand Pivot Transfers: Stand by assistance  Sit Pivot Transfers: Stand by assistance  Sit to stand: Supervision  Stand to sit: Supervision  Transfer Comments: Pt. completed 2 ADL functional transfers with use of RW. Pt. provided with SBA with gait belt to insure safety.  Pt reported \"strange feeling in legs\" upon initial stance but successfully completed transfers with proper safety and tech with use of RW. Cognition  Overall Cognitive Status: WNL  Cognition Comment: Pt. pleasant and cooperative. Pt. follows all cues and displays good recall of education from previous session. Perception  Overall Perceptual Status: WFL       Type of ROM/Therapeutic Exercise  Type of ROM/Therapeutic Exercise: AROM  Comment: Pt. completed B UE exercise program with resistant band in sets of 20 reps. Exercises  Shoulder Flexion: x20  Shoulder Extension: x20  Shoulder ABduction: x20  Shoulder ADduction: x20  Horizontal ABduction: x20  Horizontal ADduction: x20  Elbow Flexion: x20  Elbow Extension: x20         Plan   Plan  Times per week: 4-5x/week, 1-2x/day  Times per day: Daily  Current Treatment Recommendations: Strengthening, Balance Training, Functional Mobility Training, Endurance Training, Safety Education & Training, Self-Care / ADL, Patient/Caregiver Education & Training, Equipment Evaluation, Education, & procurement, Positioning  Plan Comment: Cont with same POC achieve stated goals.        AM-PAC Score        AM-[facility] Inpatient Daily Activity Raw Score: 19 (10/11/21 0928)  AM-PAC Inpatient ADL T-Scale Score : 40.22 (10/11/21 2200)  ADL Inpatient CMS 0-100% Score: 42.8 (10/11/21 0932)  ADL Inpatient CMS G-Code Modifier : CK (10/11/21 0687)    Goals  Short term goals  Time Frame for Short term goals: by discharge, pt will  Short term goal 1: demo CGA with ADL transfers with approp AD/DME and good safety  Short term goal 2: demo and verb good understanding of ed provided on fall prevention techs, equip needs, d/c recommendations, and EC/WS techs  Short term goal 3: demo CGA with toileting routine at approp level with good safety, DME as needed  Short term goal 4: demo SBA with UB ADls and min A LB ADLs with DME as needed and good safety/pacing  Short term goal 5: demo I/S with B UE HEP for inc strength in all muscle groups for inc. functional strength to complete ADLs and functional transfers  Long term goals  Long term goal 1: demo CGA with functional mob in room distances with good safety/pacing for ADL completion with approp AD  Patient Goals   Patient goals : to return home, full function       Therapy Time   Individual Concurrent Group Co-treatment   Time In 0830         Time Out 0908         Minutes 45              RN reports patient is medically stable for therapy treatment this date. Chart reviewed prior to treatment and patient is agreeable for therapy. All lines intact and patient positioned comfortably at end of treatment. All patient needs addressed prior to ending therapy session.       GASTON Davila

## 2021-10-11 NOTE — PROGRESS NOTES
Present: Yes  Subjective  Subjective: Pt is motivated, pleasant & cooperative. General Comment  Comments: RN states patient appropriate for therapy          Orientation  Orientation  Overall Orientation Status: Within Normal Limits  Cognition   Cognition  Overall Cognitive Status: WNL  Cognition Comment: Pt. pleasant and cooperative. Pt. follows all cues and displays good recall of education from previous session. Objective   Bed mobility  Bridging: Independent  Rolling to Left: Independent  Rolling to Right: Independent  Supine to Sit: Independent  Sit to Supine: Independent  Scooting: Independent  Comment: Patient supine arrival. Independent with all bed mobility. Transfers  Sit to Stand: Stand by assistance  Stand to sit: Stand by assistance  Bed to Chair: Stand by assistance  Stand Pivot Transfers: Stand by assistance  Lateral Transfers: Stand by assistance  Comment: Pt demos safe transfers with and without AD. Ambulation  Ambulation?: Yes  More Ambulation?: No  Ambulation 1  Surface: level tile  Device: Rolling Walker  Assistance: Contact guard assistance  Quality of Gait: dec philomena, dec B heel strike, pt guarded with good safety awareness. 2 minimal LOB d/t fatigue post stair training. Gait Deviations: Slow Philomena;Decreased step length  Distance: 175'  Comments: Increased distance this date per patient request, patient fatigued MCFP through gait and LEs became shaky. Stairs/Curb  Stairs?: Yes  Stairs  # Steps : 12  Stairs Height: 6\"  Rails: Bilateral  Device: No Device  Assistance: Contact guard assistance  Comment: Patient tolerated descending and ascending 12 steps well, requried seated rest break at bottom and top of stair. Educated on pursed lip breathing, posture, tech, safety, putting a chair at top and bottom of stairs to recover, self pacing tech.      Balance  Posture: Good  Sitting - Static: Good  Sitting - Dynamic: Good  Standing - Static: Fair;+  Standing - Dynamic: Fair  Comments: Mild

## 2021-10-11 NOTE — PROGRESS NOTES
[facility]        10/11/2021   5:49 PM    Name:  Tacho Duncan  MRN:    [id number]     Acct:     [de-identified]   Room:  2041/2041-01  IP Day: 6     Admit Date: 9/29/2021  8:10 PM  PCP: Ashly Rojo MD    C/C:   Chief Complaint   Patient presents with    Extremity Weakness    Hand Numbness       Assessment:      · Guillain [place] syndrome  · Upper and lower extremity weakness/Paresthesias  · Worsening left-sided facial droop  · Lumbar arthritis/Low back pain  · Lumbar herniated disc  · Acute hypoxic respitratory failure  · Fall  · Essential Hypertension  · Mixed hyperlipidemia   · Constipation    · GERD without esophagitis   · Anxiety disorder  · Insomnia       Plan:        · Patient is currently in ICU  · O2 ro maintain O2 sat > 92%  · Neurochecks per protocol  · S/p IVIG x5  · Plasmapheresis x5, started on 10/6/2021  · Speech evaluation  · CT brain no acute changes  · CT abdomen and pelvis without contrast no acute changes  · Neurology following  · DVT and GI prophylaxis  · Continue to monitor/telemetry/CBC with differential daily/BMP daily  · Continue medications as below    PMNR consult    Scheduled Meds:   gabapentin  600 mg Oral TID    calcium gluconate IVPB  2,000 mg IntraVENous Once    albumin human  200 g IntraVENous Once    heparin (porcine)  15,000 Units IntraCATHeter Once    amLODIPine  10 mg Oral Daily    docusate sodium  100 mg Oral Daily    famotidine  20 mg Oral BID    sodium chloride flush  10 mL IntraVENous 2 times per day    enoxaparin  40 mg SubCUTAneous Daily     Continuous Infusions:   sodium chloride 25 mL (10/02/21 1339)     PRN Meds:  ibuprofen, 800 mg, Q8H PRN  acetaminophen, 650 mg, Q4H PRN  calcium gluconate IVPB, 1,000 mg, PRN  hydrALAZINE, 10 mg, Q6H PRN  ALPRAZolam, 0.25 mg, BID PRN  senna, 1 tablet, BID PRN  aluminum & magnesium hydroxide-simethicone, 30 mL, TID PRN  sodium chloride flush, 10 mL, PRN  sodium chloride, 25 mL, PRN  ondansetron, 4 mg, Q8H PRN   Or  ondansetron, 4 mg, Q6H PRN  magnesium hydroxide, 30 mL, Daily PRN  diphenhydrAMINE, 25 mg, Q6H PRN  zolpidem, 5 mg, Nightly PRN  HYDROcodone 5 mg - acetaminophen, 1 tablet, Q4H PRN            Subjective:     Patient seen and examined at bedside and reviewed  last 24 hrs events with nursing staff  No acute events overnight. Patient denies any acute complaints. Afebrile  Patient denies any chest pain, shortness of Breath, palpitation, headache, dizziness, cough, nausea, vomiting, abdominal pain, pain, changes in urination or bowel habit or rash. Patient still continues to have neurological symptoms continued on plasmapheresis        ROS:  A 10 point system reviewed and negative otherwise mentioned above. Physical Examination:      Vitals:  BP (!) 150/103   Pulse 83   Temp 97.2 °F (36.2 °C) (Temporal)   Resp 14   Ht 6' (1.829 m)   Wt 200 lb (90.7 kg)   SpO2 94%   BMI 27.12 kg/m²   Temp (24hrs), Av.5 °F (36.4 °C), Min:97 °F (36.1 °C), Max:98 °F (36.7 °C)    Weight:   Wt Readings from Last 3 Encounters:   10/09/21 200 lb (90.7 kg)     I/O last 3 completed shifts:  No intake/output data recorded. No results for input(s): POCGLU in the last 72 hours.       General appearance - alert, well appearing, and in no acute distress  Mental status - oriented to person, place, and time with normal affect  Head - normocephalic and atraumatic  Eyes - pupils equal and reactive, extraocular eye movements intact, conjunctiva clear  Ears - hearing appears to be intact  Nose - no drainage noted  Mouth - mucous membranes moist  Neck - supple, no carotid bruits, thyroid not palpable  Chest - clear to auscultation, normal effort  Heart - normal rate, regular rhythm, no murmur  Abdomen - soft, nontender, nondistended, bowel sounds present all four quadrants, no masses, hepatomegaly or splenomegaly  Neurological - normal speech, no focal findings or movement disorder noted, cranial nerves II through XII grossly intact  Extremities - peripheral pulses palpable, no pedal edema or calf pain with palpation  Skin - no gross lesions, rashes, or induration noted        Medications:      Allergies: No Known Allergies    Current Meds:   Current Facility-Administered Medications:     gabapentin (NEURONTIN) capsule 600 mg, 600 mg, Oral, TID, Luis Carlos Gagnon MD, 600 mg at 10/11/21 1416    calcium gluconate 2,000 mg in sodium chloride 0.9 % 100 mL IVPB, 2,000 mg, IntraVENous, Once, Luis Carlos Gagnon MD    albumin human 5 % IV solution 200 g, 200 g, IntraVENous, Once, Luis Carlos Gagnon MD    heparin (porcine) injection 15,000 Units, 15,000 Units, IntraCATHeter, Once, Luis Carlos Gagnon MD    ibuprofen (ADVIL;MOTRIN) tablet 800 mg, 800 mg, Oral, Q8H PRN, Nalini Metcalf MD, 800 mg at 10/11/21 0830    acetaminophen (TYLENOL) tablet 650 mg, 650 mg, Oral, Q4H PRN, Alok Sterling MD    calcium gluconate 1,000 mg in sodium chloride 0.9 % 100 mL IVPB, 1,000 mg, IntraVENous, PRN, Luis Carlos Gagnon MD    amLODIPine (NORVASC) tablet 10 mg, 10 mg, Oral, Daily, Albaro Spencer MD, 10 mg at 10/11/21 3332    hydrALAZINE (APRESOLINE) injection 10 mg, 10 mg, IntraVENous, Q6H PRN, Albaro Spencer MD, 10 mg at 10/05/21 0219    ALPRAZolam (XANAX) tablet 0.25 mg, 0.25 mg, Oral, BID PRN, Kirk Veloz MD, 0.25 mg at 10/10/21 0905    senna (SENOKOT) tablet 8.6 mg, 1 tablet, Oral, BID PRN, Kirk Veloz MD, 8.6 mg at 10/08/21 2223    docusate sodium (COLACE) capsule 100 mg, 100 mg, Oral, Daily, Albaro Spencer MD, 100 mg at 10/11/21 0823    aluminum & magnesium hydroxide-simethicone (MAALOX) 200-200-20 MG/5ML suspension 30 mL, 30 mL, Oral, TID PRN, Albaro Spencer MD, 30 mL at 10/04/21 1736    famotidine (PEPCID) tablet 20 mg, 20 mg, Oral, BID, Albaro Spencer MD, 20 mg at 10/11/21 0823    sodium chloride flush 0.9 % injection 10 mL, 10 mL, IntraVENous, 2 times per day, Albaro Spencer, SPECIAL  No results found for: CULTURE    No results found for: POCPH, PHART, PH, POCPCO2, HNO7IRX, PCO2, POCPO2, PO2ART, PO2, POCHCO3, UDU3ENK, HCO3, NBEA, PBEA, BEART, BE, THGBART, THB, NSM7EMK, NEAD4CTC, X4DATYUV, O2SAT, FIO2    Radiology:    CT Head WO Contrast    Result Date: 9/29/2021  No acute intracranial abnormality. CT CERVICAL SPINE WO CONTRAST    Result Date: 9/29/2021  No acute abnormality of the cervical spine. CT THORACIC SPINE WO CONTRAST    Result Date: 9/29/2021  1. No large disc herniation or protrusion by CT. 2. Other findings as described. CT Lumbar Spine WO Contrast    Result Date: 9/29/2021  1. No acute fracture. No listhesis. 2. Other findings as described. IR LUMBAR PUNCTURE FOR DIAGNOSIS    Result Date: 10/4/2021  Successful fluoroscopic-guided lumbar puncture. All radiological studies reviewed  Code Status:  Full Code        Electronically signed by Zackery Dela Cruz MD on 10/11/2021 at 5:49 PM    This note was created with the assistance of a speech-recognition program.  Although the intention is to generate a document that actually reflects the content of the visit, no guarantees can be provided that every mistake has been identified and corrected by editing. Note was updated later by me after  physical examination and  completion of the assessment.

## 2021-10-12 LAB
ABSOLUTE EOS #: 0.18 K/UL (ref 0–0.44)
ABSOLUTE IMMATURE GRANULOCYTE: 0.03 K/UL (ref 0–0.3)
ABSOLUTE LYMPH #: 2.11 K/UL (ref 1.1–3.7)
ABSOLUTE MONO #: 0.85 K/UL (ref 0.1–1.2)
ANION GAP SERPL CALCULATED.3IONS-SCNC: 8 MMOL/L (ref 9–17)
BASOPHILS # BLD: 1 % (ref 0–2)
BASOPHILS ABSOLUTE: 0.05 K/UL (ref 0–0.2)
BUN BLDV-MCNC: 15 MG/DL (ref 6–20)
BUN/CREAT BLD: 15 (ref 9–20)
CALCIUM SERPL-MCNC: 9.2 MG/DL (ref 8.6–10.4)
CHLORIDE BLD-SCNC: 102 MMOL/L (ref 98–107)
CO2: 27 MMOL/L (ref 20–31)
CREAT SERPL-MCNC: 1.01 MG/DL (ref 0.7–1.2)
DIFFERENTIAL TYPE: ABNORMAL
EOSINOPHILS RELATIVE PERCENT: 2 % (ref 1–4)
FIBRINOGEN: 193 MG/DL (ref 179–518)
GFR AFRICAN AMERICAN: >60 ML/MIN
GFR NON-AFRICAN AMERICAN: >60 ML/MIN
GFR SERPL CREATININE-BSD FRML MDRD: ABNORMAL ML/MIN/{1.73_M2}
GFR SERPL CREATININE-BSD FRML MDRD: ABNORMAL ML/MIN/{1.73_M2}
GLUCOSE BLD-MCNC: 108 MG/DL (ref 70–99)
HCT VFR BLD CALC: 45.3 % (ref 40.7–50.3)
HEMOGLOBIN: 15.1 G/DL (ref 13–17)
IMMATURE GRANULOCYTES: 0 %
LYMPHOCYTES # BLD: 21 % (ref 24–43)
MCH RBC QN AUTO: 27.9 PG (ref 25.2–33.5)
MCHC RBC AUTO-ENTMCNC: 33.3 G/DL (ref 28.4–34.8)
MCV RBC AUTO: 83.6 FL (ref 82.6–102.9)
MONOCYTES # BLD: 8 % (ref 3–12)
NRBC AUTOMATED: 0 PER 100 WBC
PDW BLD-RTO: 12.6 % (ref 11.8–14.4)
PLATELET # BLD: 196 K/UL (ref 138–453)
PLATELET ESTIMATE: ABNORMAL
PMV BLD AUTO: 9.9 FL (ref 8.1–13.5)
POC IONIZED CALCIUM: 1.17 MMOL/L (ref 1.15–1.33)
POTASSIUM SERPL-SCNC: 4.5 MMOL/L (ref 3.7–5.3)
RBC # BLD: 5.42 M/UL (ref 4.21–5.77)
RBC # BLD: ABNORMAL 10*6/UL
SEG NEUTROPHILS: 68 % (ref 36–65)
SEGMENTED NEUTROPHILS ABSOLUTE COUNT: 6.89 K/UL (ref 1.5–8.1)
SODIUM BLD-SCNC: 137 MMOL/L (ref 135–144)
WBC # BLD: 10.1 K/UL (ref 3.5–11.3)
WBC # BLD: ABNORMAL 10*3/UL

## 2021-10-12 PROCEDURE — 2060000000 HC ICU INTERMEDIATE R&B

## 2021-10-12 PROCEDURE — 97530 THERAPEUTIC ACTIVITIES: CPT

## 2021-10-12 PROCEDURE — 85384 FIBRINOGEN ACTIVITY: CPT

## 2021-10-12 PROCEDURE — 97110 THERAPEUTIC EXERCISES: CPT

## 2021-10-12 PROCEDURE — 6370000000 HC RX 637 (ALT 250 FOR IP): Performed by: PSYCHIATRY & NEUROLOGY

## 2021-10-12 PROCEDURE — P9041 ALBUMIN (HUMAN),5%, 50ML: HCPCS | Performed by: PSYCHIATRY & NEUROLOGY

## 2021-10-12 PROCEDURE — 36415 COLL VENOUS BLD VENIPUNCTURE: CPT

## 2021-10-12 PROCEDURE — 6360000002 HC RX W HCPCS: Performed by: PSYCHIATRY & NEUROLOGY

## 2021-10-12 PROCEDURE — 80048 BASIC METABOLIC PNL TOTAL CA: CPT

## 2021-10-12 PROCEDURE — 6360000002 HC RX W HCPCS: Performed by: FAMILY MEDICINE

## 2021-10-12 PROCEDURE — 6370000000 HC RX 637 (ALT 250 FOR IP): Performed by: FAMILY MEDICINE

## 2021-10-12 PROCEDURE — 2580000003 HC RX 258: Performed by: FAMILY MEDICINE

## 2021-10-12 PROCEDURE — 2580000003 HC RX 258: Performed by: PSYCHIATRY & NEUROLOGY

## 2021-10-12 PROCEDURE — 82330 ASSAY OF CALCIUM: CPT

## 2021-10-12 PROCEDURE — 85025 COMPLETE CBC W/AUTO DIFF WBC: CPT

## 2021-10-12 PROCEDURE — 97535 SELF CARE MNGMENT TRAINING: CPT

## 2021-10-12 PROCEDURE — 6370000000 HC RX 637 (ALT 250 FOR IP): Performed by: HOSPITALIST

## 2021-10-12 RX ORDER — ALBUMIN, HUMAN INJ 5% 5 %
200 SOLUTION INTRAVENOUS ONCE
Status: COMPLETED | OUTPATIENT
Start: 2021-10-12 | End: 2021-10-12

## 2021-10-12 RX ORDER — HEPARIN SODIUM 5000 [USP'U]/ML
15000 INJECTION, SOLUTION INTRAVENOUS; SUBCUTANEOUS EVERY 8 HOURS SCHEDULED
Status: COMPLETED | OUTPATIENT
Start: 2021-10-12 | End: 2021-10-12

## 2021-10-12 RX ORDER — ZOLPIDEM TARTRATE 5 MG/1
10 TABLET ORAL NIGHTLY PRN
Status: DISCONTINUED | OUTPATIENT
Start: 2021-10-12 | End: 2021-10-14 | Stop reason: HOSPADM

## 2021-10-12 RX ORDER — GABAPENTIN 300 MG/1
600 CAPSULE ORAL 2 TIMES DAILY
Status: DISCONTINUED | OUTPATIENT
Start: 2021-10-12 | End: 2021-10-14 | Stop reason: HOSPADM

## 2021-10-12 RX ORDER — GABAPENTIN 300 MG/1
900 CAPSULE ORAL NIGHTLY
Status: DISCONTINUED | OUTPATIENT
Start: 2021-10-12 | End: 2021-10-14 | Stop reason: HOSPADM

## 2021-10-12 RX ADMIN — IBUPROFEN 800 MG: 800 TABLET, FILM COATED ORAL at 20:13

## 2021-10-12 RX ADMIN — ZOLPIDEM TARTRATE 10 MG: 5 TABLET ORAL at 20:13

## 2021-10-12 RX ADMIN — IBUPROFEN 800 MG: 800 TABLET, FILM COATED ORAL at 07:07

## 2021-10-12 RX ADMIN — FAMOTIDINE 20 MG: 20 TABLET, FILM COATED ORAL at 08:50

## 2021-10-12 RX ADMIN — ENOXAPARIN SODIUM 40 MG: 40 INJECTION SUBCUTANEOUS at 08:50

## 2021-10-12 RX ADMIN — ALPRAZOLAM 0.25 MG: 0.25 TABLET ORAL at 03:46

## 2021-10-12 RX ADMIN — MAGNESIUM HYDROXIDE/ALUMINUM HYDROXICE/SIMETHICONE 30 ML: 120; 1200; 1200 SUSPENSION ORAL at 07:19

## 2021-10-12 RX ADMIN — SODIUM CHLORIDE, PRESERVATIVE FREE 10 ML: 5 INJECTION INTRAVENOUS at 20:13

## 2021-10-12 RX ADMIN — MAGNESIUM OXIDE TAB 400 MG (241.3 MG ELEMENTAL MG) 400 MG: 400 (241.3 MG) TAB at 14:59

## 2021-10-12 RX ADMIN — DOCUSATE SODIUM 100 MG: 100 CAPSULE, LIQUID FILLED ORAL at 08:50

## 2021-10-12 RX ADMIN — AMLODIPINE BESYLATE 10 MG: 10 TABLET ORAL at 14:57

## 2021-10-12 RX ADMIN — HEPARIN SODIUM 15000 UNITS: 5000 INJECTION INTRAVENOUS; SUBCUTANEOUS at 08:53

## 2021-10-12 RX ADMIN — ALBUMIN (HUMAN) 200 G: 12.5 INJECTION, SOLUTION INTRAVENOUS at 08:54

## 2021-10-12 RX ADMIN — SODIUM CHLORIDE, PRESERVATIVE FREE 10 ML: 5 INJECTION INTRAVENOUS at 08:52

## 2021-10-12 RX ADMIN — FAMOTIDINE 20 MG: 20 TABLET, FILM COATED ORAL at 20:12

## 2021-10-12 RX ADMIN — GABAPENTIN 600 MG: 300 CAPSULE ORAL at 08:50

## 2021-10-12 RX ADMIN — CALCIUM GLUCONATE 2000 MG: 98 INJECTION, SOLUTION INTRAVENOUS at 08:54

## 2021-10-12 RX ADMIN — GABAPENTIN 900 MG: 300 CAPSULE ORAL at 20:12

## 2021-10-12 RX ADMIN — GABAPENTIN 600 MG: 300 CAPSULE ORAL at 14:59

## 2021-10-12 ASSESSMENT — PAIN DESCRIPTION - DESCRIPTORS: DESCRIPTORS: SHARP

## 2021-10-12 ASSESSMENT — PAIN SCALES - GENERAL
PAINLEVEL_OUTOF10: 7
PAINLEVEL_OUTOF10: 8
PAINLEVEL_OUTOF10: 0
PAINLEVEL_OUTOF10: 4
PAINLEVEL_OUTOF10: 8
PAINLEVEL_OUTOF10: 0

## 2021-10-12 ASSESSMENT — PAIN DESCRIPTION - ONSET: ONSET: GRADUAL

## 2021-10-12 ASSESSMENT — PAIN DESCRIPTION - LOCATION: LOCATION: ABDOMEN

## 2021-10-12 ASSESSMENT — PAIN DESCRIPTION - PROGRESSION: CLINICAL_PROGRESSION: GRADUALLY WORSENING

## 2021-10-12 ASSESSMENT — PAIN DESCRIPTION - FREQUENCY: FREQUENCY: CONTINUOUS

## 2021-10-12 ASSESSMENT — PAIN DESCRIPTION - PAIN TYPE: TYPE: ACUTE PAIN

## 2021-10-12 ASSESSMENT — PAIN DESCRIPTION - ORIENTATION: ORIENTATION: UPPER

## 2021-10-12 NOTE — PROGRESS NOTES
Occupational Therapy  Facility/Department: [facility] CVICU  Daily Treatment Note  NAME: Nigel Johns  : 1975  MRN: [id number]    Date of Service: 10/12/2021    Discharge Recommendations:  Patient would benefit from continued therapy after discharge       Assessment   Performance deficits / Impairments: Decreased functional mobility ; Decreased ADL status; Decreased strength;Decreased safe awareness;Decreased endurance;Decreased sensation;Decreased balance;Decreased coordination;Decreased fine motor control  Assessment: Pt. completed mod I toilet transfer with use of walker today. Pt. completed functional endurance task with multiple transfers with use of RW displaying good bal and proper positioning throughout mobility. O2 monitored and levels remained 94-97% on room air. Pt. contiues to make gains towards goals and remain motivated. Skilled OT is warranted to return pt to prior level of function and return home with assist as needed. Prognosis: Good  OT Education: OT Role;Plan of Care;Home Exercise Program;Precautions; ADL Adaptive Strategies;Transfer Training;Energy Conservation; Family Education  Patient Education: Pt. and family educated on enery conservation tech. once home and planning day. Pt and family attentive and receptive to information. REQUIRES OT FOLLOW UP: Yes  Activity Tolerance  Activity Tolerance: Patient Tolerated treatment well;Patient limited by fatigue  Activity Tolerance: Pt. required min/mod rest breaks between sets of exercise d/t increased heart rate. Heart rate decreased after rest and pt. continued with task at hand. Safety Devices  Safety Devices in place: Yes  Type of devices: All fall risk precautions in place;Call light within reach;Gait belt;Left in bed;Nurse notified         Patient Diagnosis(es): The primary encounter diagnosis was Paresthesia of both lower extremities.  Diagnoses of Generalized weakness, Paresthesia of both hands, and Chronic bilateral low back pain with bilateral sciatica were also pertinent to this visit. has a past medical history of Herniation of intervertebral disc between L5 and S1, Low back pain, and Sciatica. has a past surgical history that includes hernia repair (Right) and IR NONTUNNELED VASCULAR CATHETER > 5 YEARS (10/5/2021). Restrictions  Restrictions/Precautions  Restrictions/Precautions: General Precautions, Fall Risk  Required Braces or Orthoses?: No  Position Activity Restriction  Other position/activity restrictions: Pt. agreeable for treatment. Subjective   General  Chart Reviewed: Yes  Patient assessed for rehabilitation services?: Yes  Additional Pertinent Hx: Upon arrival to room, pt in bed and agreeable for treatment. Response to previous treatment: Patient with no complaints from previous session  Family / Caregiver Present: Yes (mother and father present for treatment.)  General Comment  Comments: \"I feel stronger today. ... Lorena Orozco better\"      Orientation  Orientation  Overall Orientation Status: Within Normal Limits  Objective    ADL  Toileting: Modified independent  (with use of walker)  Additional Comments: Pt. completed toileting task with mod I with use of RW to display proper safety awareness during task. Balance  Sitting Balance: Independent  Standing Balance: Stand by assistance  Standing Balance  Time: Pt. tolerated standing x6 min. Functional Mobility  Functional - Mobility Device: Rolling Walker  Functional Mobility Comments: Pt. displays good motivation and is eager for treatment daily. Toilet Transfers  Toilet - Technique: Ambulating  Toilet Transfer: Modified independent  Toilet Transfers Comments: Pt. completed task with good safety judgement and with proper use of walker.   Bed mobility  Bridging: Independent  Rolling to Left: Independent  Rolling to Right: Independent  Supine to Sit: Independent  Sit to Supine: Independent  Scooting: Independent  Transfers  Sit to stand: Supervision  Stand to sit: Supervision  Transfer Comments: Pt. completed 4 ADL transfers with use of RW displaying proper safety awareness with minmod fatigue after each transfer. Cognition  Overall Cognitive Status: WNL  Cognition Comment: Pt. pleasant and cooperative with treatment. Pt. displays carryover/good follow through from education and previous session       Type of ROM/Therapeutic Exercise  Type of ROM/Therapeutic Exercise: AROM  Comment: Pt. completed B UE exercise program with resistant band in sets of 20 reps. Exercises  Shoulder Flexion: x20  Shoulder Extension: x20  Shoulder ADduction: x20  Horizontal ADduction: x20  Elbow Flexion: x20  Elbow Extension: x20  Other: Pt. completed sets of exercise with blue resistive band with good recall on proper tech for slow controled movements. Plan   Plan  Times per week: 4-5x/week, 1-2x/day  Times per day: Daily  Current Treatment Recommendations: Strengthening, Balance Training, Functional Mobility Training, Endurance Training, Safety Education & Training, Self-Care / ADL, Patient/Caregiver Education & Training, Equipment Evaluation, Education, & procurement, Positioning  Plan Comment: Cont with same POC achieve stated goals.          AM-PAC Score        AM-[facility] Inpatient Daily Activity Raw Score: 22 (10/12/21 1425)  AM-PAC Inpatient ADL T-Scale Score : 47.1 (10/12/21 1425)  ADL Inpatient CMS 0-100% Score: 25.8 (10/12/21 1425)  ADL Inpatient CMS G-Code Modifier : CJ (10/12/21 1425)    Goals  Short term goals  Time Frame for Short term goals: by discharge, pt will  Short term goal 1: demo CGA with ADL transfers with approp AD/DME and good safety  Short term goal 2: demo and verb good understanding of ed provided on fall prevention techs, equip needs, d/c recommendations, and EC/WS techs  Short term goal 3: demo CGA with toileting routine at approp level with good safety, DME as needed  Short term goal 4: demo SBA with UB ADls and min A LB ADLs with DME as needed and good safety/pacing  Short term goal 5: demo I/S with B UE HEP for inc strength in all muscle groups for inc. functional strength to complete ADLs and functional transfers  Long term goals  Long term goal 1: demo CGA with functional mob in room distances with good safety/pacing for ADL completion with approp AD  Patient Goals   Patient goals : to return home, full function       Therapy Time   Individual Concurrent Group Co-treatment   Time In 0130         Time Out 0215         Minutes 39              RN reports patient is medically stable for therapy treatment this date. Chart reviewed prior to treatment and patient is agreeable for therapy. All lines intact and patient positioned comfortably at end of treatment. All patient needs addressed prior to ending therapy session.       Debra Anaya GASTON

## 2021-10-12 NOTE — CARE COORDINATION
Spoke with the patient. The plan is home possibly Thursday, with home care and a rolling walker. Home care list provided. Continue to follow.

## 2021-10-12 NOTE — PROGRESS NOTES
Physical Therapy  DATE: 10/12/2021    NAME: Marilin Louis  MRN: [id number]   : 1975    Patient not seen this date for Physical Therapy due to:      [] Cancel by RN or physician due to:    [] Hemodialysis    [] Critical Lab Value Level     [] Blood transfusion in progress    [] Acute or unstable cardiovascular status   _MAP < 55 or more than >115  _HR < 40 or > 130    [] Acute or unstable pulmonary status   -FiO2 > 60%   _RR < 5 or >40    _O2 sats < 85%    [] Strict Bedrest    [] Off Unit for surgery or procedure    [] Off Unit for testing       [] Pending imaging to R/O fracture    [] Refusal by Patient      [x] Other (plasmapharesis being preformed in patient's room)      [] PT being discontinued at this time. Patient independent. No further needs. [] PT being discontinued at this time as the patient has been transferred to hospice care. No further needs.       Miguel Bill, PTA

## 2021-10-12 NOTE — PROGRESS NOTES
Plasma exchange completed by jim cadena RN . Pt tolerated well. Medications scanned in epic when given to [facility] RN .

## 2021-10-12 NOTE — PLAN OF CARE
Problem: Falls - Risk of:  Goal: Will remain free from falls  Description: Will remain free from falls  Outcome: Ongoing     Problem: Falls - Risk of:  Goal: Absence of physical injury  Description: Absence of physical injury  Outcome: Ongoing     Problem: Infection:  Goal: Will remain free from infection  Description: Will remain free from infection  Outcome: Ongoing     Problem: Safety:  Goal: Free from accidental physical injury  Description: Free from accidental physical injury  Outcome: Ongoing     Problem: Safety:  Goal: Ability to chew and swallow food without choking will improve  Description: Ability to chew and swallow food without choking will improve  Outcome: Ongoing     Problem: Pain:  Goal: Patient's pain/discomfort is manageable  Description: Patient's pain/discomfort is manageable  Outcome: Ongoing     Problem: Pain:  Goal: Pain level will decrease  Description: Pain level will decrease  Outcome: Ongoing     Problem: Pain:  Goal: Control of acute pain  Description: Control of acute pain  Outcome: Ongoing     Problem: Pain:  Goal: Control of chronic pain  Description: Control of chronic pain  Outcome: Ongoing     Problem: Respiratory:  Goal: Will remain free from infection  Description: Will remain free from infection  Outcome: Ongoing     Problem: Skin Integrity:  Goal: Will show no infection signs and symptoms  Description: Will show no infection signs and symptoms  Outcome: Ongoing     Problem: Skin Integrity:  Goal: Absence of new skin breakdown  Description: Absence of new skin breakdown  Outcome: Ongoing

## 2021-10-12 NOTE — PLAN OF CARE
Problem: Falls - Risk of:  Goal: Will remain free from falls  Description: Will remain free from falls  Outcome: Met This Shift     Problem: Infection:  Goal: Will remain free from infection  Description: Will remain free from infection  Outcome: Met This Shift     Problem: Safety:  Goal: Free from intentional harm  Description: Free from intentional harm  Outcome: Met This Shift     Problem: Daily Care:  Goal: Daily care needs are met  Description: Daily care needs are met  Outcome: Met This Shift     Problem: Pain:  Goal: Patient's pain/discomfort is manageable  Description: Patient's pain/discomfort is manageable  Outcome: Ongoing  Pt reporting abdominal pain during shift. PRN medication given as needed . Will continue to monitor.       Problem: Respiratory:  Goal: Will remain free from infection  Description: Will remain free from infection  Outcome: Met This Shift

## 2021-10-13 LAB
ANION GAP SERPL CALCULATED.3IONS-SCNC: 9 MMOL/L (ref 9–17)
BUN BLDV-MCNC: 18 MG/DL (ref 6–20)
BUN/CREAT BLD: 17 (ref 9–20)
CALCIUM SERPL-MCNC: 8.5 MG/DL (ref 8.6–10.4)
CHLORIDE BLD-SCNC: 106 MMOL/L (ref 98–107)
CO2: 20 MMOL/L (ref 20–31)
CREAT SERPL-MCNC: 1.09 MG/DL (ref 0.7–1.2)
EKG ATRIAL RATE: 70 BPM
EKG P AXIS: 56 DEGREES
EKG P-R INTERVAL: 158 MS
EKG Q-T INTERVAL: 390 MS
EKG QRS DURATION: 94 MS
EKG QTC CALCULATION (BAZETT): 421 MS
EKG R AXIS: -7 DEGREES
EKG T AXIS: 12 DEGREES
EKG VENTRICULAR RATE: 70 BPM
GFR AFRICAN AMERICAN: >60 ML/MIN
GFR NON-AFRICAN AMERICAN: >60 ML/MIN
GFR SERPL CREATININE-BSD FRML MDRD: ABNORMAL ML/MIN/{1.73_M2}
GFR SERPL CREATININE-BSD FRML MDRD: ABNORMAL ML/MIN/{1.73_M2}
GLUCOSE BLD-MCNC: 102 MG/DL (ref 70–99)
LV EF: 65 %
LVEF MODALITY: NORMAL
POTASSIUM SERPL-SCNC: 4.9 MMOL/L (ref 3.7–5.3)
SODIUM BLD-SCNC: 135 MMOL/L (ref 135–144)
T3 FREE: 3.11 PG/ML (ref 2.02–4.43)
TSH SERPL DL<=0.05 MIU/L-ACNC: 1.85 MIU/L (ref 0.3–5)

## 2021-10-13 PROCEDURE — 6370000000 HC RX 637 (ALT 250 FOR IP): Performed by: PSYCHIATRY & NEUROLOGY

## 2021-10-13 PROCEDURE — 2060000000 HC ICU INTERMEDIATE R&B

## 2021-10-13 PROCEDURE — 2580000003 HC RX 258: Performed by: FAMILY MEDICINE

## 2021-10-13 PROCEDURE — 6370000000 HC RX 637 (ALT 250 FOR IP): Performed by: HOSPITALIST

## 2021-10-13 PROCEDURE — 99232 SBSQ HOSP IP/OBS MODERATE 35: CPT | Performed by: PSYCHIATRY & NEUROLOGY

## 2021-10-13 PROCEDURE — 93005 ELECTROCARDIOGRAM TRACING: CPT | Performed by: FAMILY MEDICINE

## 2021-10-13 PROCEDURE — 80048 BASIC METABOLIC PNL TOTAL CA: CPT

## 2021-10-13 PROCEDURE — 6370000000 HC RX 637 (ALT 250 FOR IP): Performed by: FAMILY MEDICINE

## 2021-10-13 PROCEDURE — 84481 FREE ASSAY (FT-3): CPT

## 2021-10-13 PROCEDURE — 93010 ELECTROCARDIOGRAM REPORT: CPT | Performed by: INTERNAL MEDICINE

## 2021-10-13 PROCEDURE — 93306 TTE W/DOPPLER COMPLETE: CPT

## 2021-10-13 PROCEDURE — 6360000002 HC RX W HCPCS: Performed by: FAMILY MEDICINE

## 2021-10-13 PROCEDURE — 94761 N-INVAS EAR/PLS OXIMETRY MLT: CPT

## 2021-10-13 PROCEDURE — 84443 ASSAY THYROID STIM HORMONE: CPT

## 2021-10-13 PROCEDURE — 36415 COLL VENOUS BLD VENIPUNCTURE: CPT

## 2021-10-13 RX ORDER — ATENOLOL 50 MG/1
25 TABLET ORAL DAILY
Status: DISCONTINUED | OUTPATIENT
Start: 2021-10-13 | End: 2021-10-14 | Stop reason: HOSPADM

## 2021-10-13 RX ORDER — SODIUM CHLORIDE 9 MG/ML
50 INJECTION, SOLUTION INTRAVENOUS ONCE
Status: CANCELLED | OUTPATIENT
Start: 2021-10-13 | End: 2021-10-13

## 2021-10-13 RX ADMIN — FAMOTIDINE 20 MG: 20 TABLET, FILM COATED ORAL at 08:58

## 2021-10-13 RX ADMIN — ZOLPIDEM TARTRATE 10 MG: 5 TABLET ORAL at 20:18

## 2021-10-13 RX ADMIN — ATENOLOL 25 MG: 50 TABLET ORAL at 16:59

## 2021-10-13 RX ADMIN — FAMOTIDINE 20 MG: 20 TABLET, FILM COATED ORAL at 20:18

## 2021-10-13 RX ADMIN — SODIUM CHLORIDE, PRESERVATIVE FREE 10 ML: 5 INJECTION INTRAVENOUS at 20:18

## 2021-10-13 RX ADMIN — SODIUM CHLORIDE, PRESERVATIVE FREE 10 ML: 5 INJECTION INTRAVENOUS at 14:16

## 2021-10-13 RX ADMIN — ENOXAPARIN SODIUM 40 MG: 40 INJECTION SUBCUTANEOUS at 08:58

## 2021-10-13 RX ADMIN — GABAPENTIN 600 MG: 300 CAPSULE ORAL at 14:16

## 2021-10-13 RX ADMIN — DOCUSATE SODIUM 100 MG: 100 CAPSULE, LIQUID FILLED ORAL at 08:58

## 2021-10-13 RX ADMIN — GABAPENTIN 600 MG: 300 CAPSULE ORAL at 08:57

## 2021-10-13 RX ADMIN — GABAPENTIN 900 MG: 300 CAPSULE ORAL at 20:17

## 2021-10-13 RX ADMIN — AMLODIPINE BESYLATE 10 MG: 10 TABLET ORAL at 08:58

## 2021-10-13 RX ADMIN — IBUPROFEN 800 MG: 800 TABLET, FILM COATED ORAL at 08:58

## 2021-10-13 RX ADMIN — MAGNESIUM OXIDE TAB 400 MG (241.3 MG ELEMENTAL MG) 400 MG: 400 (241.3 MG) TAB at 08:57

## 2021-10-13 ASSESSMENT — PAIN SCALES - GENERAL
PAINLEVEL_OUTOF10: 0
PAINLEVEL_OUTOF10: 7

## 2021-10-13 NOTE — PROGRESS NOTES
Occupational Therapy  DATE: 10/13/2021    NAME: Veverly Barthel  MRN: [id number]   : 1975    Patient not seen this date for Occupational Therapy due to:      [x] Cancel by RN or physician due to: tachycardia with mobility  [] Hemodialysis    [] Critical Lab Value Level     [] Blood transfusion in progress    [] Acute or unstable cardiovascular status   _MAP < 55 or more than >115  _HR < 40 or > 130    [] Acute or unstable pulmonary status   -FiO2 > 60%   _RR < 5 or >40    _O2 sats < 85%    [] Strict Bedrest    [] Off Unit for surgery or procedure    [] Off Unit for testing       [] Pending imaging to R/O fracture    [] Refusal by Patient      [] Other      []  OT being discontinued at this time. Patient independent. No further needs. []OT  being discontinued at this time as the patient has been transferred to hospice care. No further needs. Pt. Experiencing tachycardia with mobility. Will consult with nursing prior to next visit.           GASOTN Cuellar

## 2021-10-13 NOTE — PROGRESS NOTES
Physical Therapy  DATE: 10/13/2021    NAME: Amarjit Ivan  MRN: [id number]   : 1975    Patient not seen this date for Physical Therapy due to:      [x] Cancel by RN or physician due to:    [] Hemodialysis    [] Critical Lab Value Level     [] Blood transfusion in progress    [x] Acute or unstable cardiovascular status   _MAP < 55 or more than >115  X_HR < 40 or > 130 (Patient has been experiencing Tachycardia with mobility)    [] Acute or unstable pulmonary status   -FiO2 > 60%   _RR < 5 or >40    _O2 sats < 85%    [] Strict Bedrest    [] Off Unit for surgery or procedure    [] Off Unit for testing       [] Pending imaging to R/O fracture    [] Refusal by Patient      [] Other      [] PT being discontinued at this time. Patient independent. No further needs. [] PT being discontinued at this time as the patient has been transferred to hospice care. No further needs.       Moses Dilling, PTA

## 2021-10-13 NOTE — PLAN OF CARE
Problem: Falls - Risk of:  Goal: Will remain free from falls  Description: Will remain free from falls  10/13/2021 0048 by Krysten Mancuso RN  Outcome: Met This Shift    Problem: Infection:  Goal: Will remain free from infection  Description: Will remain free from infection  10/13/2021 0048 by Krysten Mancuso RN  Outcome: Ongoing    Problem: Daily Care:  Goal: Daily care needs are met  Description: Daily care needs are met  10/13/2021 0048 by Krysten Mancuso RN  Outcome: Ongoing    Problem: Pain:  Goal: Patient's pain/discomfort is manageable  Description: Patient's pain/discomfort is manageable  10/13/2021 0048 by Krysten Mancuso RN  Outcome: Ongoing    Goal: Pain level will decrease  Description: Pain level will decrease  Outcome: Ongoing  Goal: Control of acute pain  Description: Control of acute pain  Outcome: Ongoing  Goal: Control of chronic pain  Description: Control of chronic pain  Outcome: Ongoing     Problem: Skin Integrity:  Goal: Skin integrity will stabilize  Description: Skin integrity will stabilize  Outcome: Ongoing     Problem: Respiratory:  Goal: Will remain free from infection  Description: Will remain free from infection  10/13/2021 0048 by Krysten Mancuso RN  Outcome: Ongoing

## 2021-10-13 NOTE — PROGRESS NOTES
[facility]        10/12/2021   9:49 PM    Name:  Alexander Mccall  MRN:    [id number]     Acct:     [de-identified]   Room:  2041/2041-01  IP Day: 15     Admit Date: 9/29/2021  8:10 PM  PCP: Vania Webster MD    C/C:   Chief Complaint   Patient presents with    Extremity Weakness    Hand Numbness       Assessment:      · Guillain [place] syndrome  · Upper and lower extremity weakness/Paresthesias  · Worsening left-sided facial droop  · Lumbar arthritis/Low back pain  · Lumbar herniated disc  · Acute hypoxic respitratory failure  · Fall  · Essential Hypertension  · Mixed hyperlipidemia   · Constipation    · GERD without esophagitis   · Anxiety disorder  · Insomnia   · Tachycardia      Plan:        · Patient is currently in ICU  · O2 ro maintain O2 sat > 92%  · Neurochecks per protocol  · S/p IVIG x5  · Plasmapheresis x5, started on 10/6/2021  · Speech evaluation  · CT brain no acute changes  · CT abdomen and pelvis without contrast no acute changes  · Neurology following  · DVT and GI prophylaxis  · Continue to monitor/telemetry/CBC with differential daily/BMP daily  · Twelve-lead EKG  · Check TSH, T4  · 2D echo  · PT/OT  · Continue medications as below    PMNR consult    Scheduled Meds:   gabapentin  600 mg Oral BID- 8&2    gabapentin  900 mg Oral Nightly    magnesium oxide  400 mg Oral Daily    calcium gluconate IVPB  2,000 mg IntraVENous Once    albumin human  200 g IntraVENous Once    heparin (porcine)  15,000 Units IntraCATHeter Once    amLODIPine  10 mg Oral Daily    docusate sodium  100 mg Oral Daily    famotidine  20 mg Oral BID    sodium chloride flush  10 mL IntraVENous 2 times per day    enoxaparin  40 mg SubCUTAneous Daily     Continuous Infusions:   sodium chloride 25 mL (10/02/21 1339)     PRN Meds:  calcium gluconate IVPB, 1,000 mg, PRN  zolpidem, 10 mg, Nightly PRN  ibuprofen, 800 mg, Q8H PRN  acetaminophen, 650 mg, Q4H PRN  hydrALAZINE, 10 mg, Q6H PRN  ALPRAZolam, 0.25 mg, BID PRN  senna, 1 tablet, BID PRN  aluminum & magnesium hydroxide-simethicone, 30 mL, TID PRN  sodium chloride flush, 10 mL, PRN  sodium chloride, 25 mL, PRN  ondansetron, 4 mg, Q8H PRN   Or  ondansetron, 4 mg, Q6H PRN  magnesium hydroxide, 30 mL, Daily PRN  diphenhydrAMINE, 25 mg, Q6H PRN  HYDROcodone 5 mg - acetaminophen, 1 tablet, Q4H PRN            Subjective:     Patient seen and examined at bedside and reviewed  last 24 hrs events with nursing staff  No acute events overnight. Patient denies any acute complaints. Afebrile  Patient denies any chest pain, shortness of Breath, palpitation, headache, dizziness, cough, nausea, vomiting, abdominal pain, pain, changes in urination or bowel habit or rash. Had 3 BMs today  Patient still continues to have neurological symptoms continued on plasmapheresis        ROS:  A 10 point system reviewed and negative otherwise mentioned above. Physical Examination:      Vitals:  BP (!) 139/111   Pulse 117   Temp 98 °F (36.7 °C) (Temporal)   Resp 16   Ht 6' (1.829 m)   Wt 200 lb (90.7 kg)   SpO2 95%   BMI 27.12 kg/m²   Temp (24hrs), Av.8 °F (36.6 °C), Min:97.3 °F (36.3 °C), Max:98.3 °F (36.8 °C)    Weight:   Wt Readings from Last 3 Encounters:   10/09/21 200 lb (90.7 kg)     I/O last 3 completed shifts:  I/O last 3 completed shifts: In: 375 [P.O.:375]  Out: -      No results for input(s): POCGLU in the last 72 hours. General appearance - alert, well appearing, and in no acute distress.   Facial droop  Mental status - oriented to person, place, and time with normal affect  Head - normocephalic and atraumatic  Eyes - pupils equal and reactive, extraocular eye movements intact, conjunctiva clear  Ears - hearing appears to be intact  Nose - no drainage noted  Mouth - mucous membranes moist  Neck - supple, no carotid bruits, thyroid not palpable  Chest - clear to auscultation, normal effort  Heart - normal rate, regular rhythm, no murmur  Abdomen - soft, nontender, nondistended, bowel sounds present all four quadrants, no masses, hepatomegaly or splenomegaly  Neurological - normal speech, facial droop, no movement disorder noted, cranial nerves II through VI grossly intact  Extremities - peripheral pulses palpable, no pedal edema or calf pain with palpation  Skin - no gross lesions, rashes, or induration noted        Medications:      Allergies: No Known Allergies    Current Meds:   Current Facility-Administered Medications:     calcium gluconate 1,000 mg in dextrose 5 % 100 mL IVPB, 1,000 mg, IntraVENous, PRN, Julia Richey MD    gabapentin (NEURONTIN) capsule 600 mg, 600 mg, Oral, BID- 8&2, Mick High MD, 600 mg at 10/12/21 1459    gabapentin (NEURONTIN) capsule 900 mg, 900 mg, Oral, Nightly, Mick High MD, 900 mg at 10/12/21 2012    zolpidem (AMBIEN) tablet 10 mg, 10 mg, Oral, Nightly PRN, Mick High MD, 10 mg at 10/12/21 2013    magnesium oxide (MAG-OX) tablet 400 mg, 400 mg, Oral, Daily, Mick High MD, 400 mg at 10/12/21 1459    calcium gluconate 2,000 mg in sodium chloride 0.9 % 100 mL IVPB, 2,000 mg, IntraVENous, Once, Julia Richey MD    albumin human 5 % IV solution 200 g, 200 g, IntraVENous, Once, Julia Richey MD    heparin (porcine) injection 15,000 Units, 15,000 Units, IntraCATHeter, Once, Julia Richey MD    ibuprofen (ADVIL;MOTRIN) tablet 800 mg, 800 mg, Oral, Q8H PRN, Ambrosio Diaz MD, 800 mg at 10/12/21 2013    acetaminophen (TYLENOL) tablet 650 mg, 650 mg, Oral, Q4H PRN, Anand Wang MD    amLODIPine (NORVASC) tablet 10 mg, 10 mg, Oral, Daily, Albaro Spencer MD, 10 mg at 10/12/21 1457    hydrALAZINE (APRESOLINE) injection 10 mg, 10 mg, IntraVENous, Q6H PRN, Joleen Fuchs MD, 10 mg at 10/05/21 0219    ALPRAZolam (XANAX) tablet 0.25 mg, 0.25 mg, Oral, BID PRN, Albaro Spencer MD, 0.25 mg at 10/12/21 0346    senna (SENOKOT) tablet 8.6 mg, 1 tablet, Oral, BID PRN, Albaro Spencer MD, 8.6 mg at 10/08/21 2223    docusate sodium (COLACE) capsule 100 mg, 100 mg, Oral, Daily, Albaro Spencer MD, 100 mg at 10/12/21 0850    aluminum & magnesium hydroxide-simethicone (MAALOX) 200-200-20 MG/5ML suspension 30 mL, 30 mL, Oral, TID PRN, Albaro Spencer MD, 30 mL at 10/12/21 0719    famotidine (PEPCID) tablet 20 mg, 20 mg, Oral, BID, Albaro Spencer MD, 20 mg at 10/12/21 2012    sodium chloride flush 0.9 % injection 10 mL, 10 mL, IntraVENous, 2 times per day, Ana M Person MD, 10 mL at 10/12/21 2013    sodium chloride flush 0.9 % injection 10 mL, 10 mL, IntraVENous, PRN, Albaro Spencer MD    0.9 % sodium chloride infusion, 25 mL, IntraVENous, PRN, Albaro Spencer MD, Last Rate: 100 mL/hr at 10/02/21 1339, 25 mL at 10/02/21 1339    ondansetron (ZOFRAN-ODT) disintegrating tablet 4 mg, 4 mg, Oral, Q8H PRN, 4 mg at 09/30/21 1514 **OR** ondansetron (ZOFRAN) injection 4 mg, 4 mg, IntraVENous, Q6H PRN, Albaro Spencer MD, 4 mg at 10/03/21 0433    magnesium hydroxide (MILK OF MAGNESIA) 400 MG/5ML suspension 30 mL, 30 mL, Oral, Daily PRN, Albaro Spencer MD, 30 mL at 10/08/21 1102    enoxaparin (LOVENOX) injection 40 mg, 40 mg, SubCUTAneous, Daily, Albaro Spencer MD, 40 mg at 10/12/21 0850    diphenhydrAMINE (BENADRYL) tablet 25 mg, 25 mg, Oral, Q6H PRN, Araceli Taylor MD    HYDROcodone-acetaminophen (NORCO) 5-325 MG per tablet 1 tablet, 1 tablet, Oral, Q4H PRN, Ana M Person MD, 1 tablet at 10/08/21 0602      I/O (24Hr):     Intake/Output Summary (Last 24 hours) at 10/12/2021 2149  Last data filed at 10/12/2021 1300  Gross per 24 hour   Intake 375 ml   Output    Net 375 ml       Data:           Labs:    Hematology:  Recent Labs     10/10/21  0504 10/12/21  0454   WBC 6.8 10.1   RBC 4.87 5.42   HGB 13.4 15.1   HCT 40.5* 45.3   MCV 83.2 83.6   MCH 27.5 27.9   MCHC 33.1 33.3   RDW 12.5 12.6    196   MPV 10.1 9.9     Chemistry:  Recent Labs     10/10/21  3336 10/11/21  0333 10/12/21  0454    135 137   K 4.5 4.4 4.5    103 102   CO2 22 22 27   GLUCOSE 100* 103* 108*   BUN 18 15 15   CREATININE 0.96 1.01 1.01   ANIONGAP 11 10 8*   LABGLOM >60 >60 >60   GFRAA >60 >60 >60   CALCIUM 9.0 8.6 9.2   CAION 1.29  --   --      No results for input(s): PROT, LABALBU, LABA1C, J1BNNLP, B9VWZWI, FT4, TSH, AST, ALT, LDH, GGT, ALKPHOS, LABGGT, BILITOT, BILIDIR, AMMONIA, AMYLASE, LIPASE, LACTATE, CHOL, HDL, LDLCHOLESTEROL, CHOLHDLRATIO, TRIG, VLDL, QRU97FG, PHENYTOIN, PHENYF, URICACID, POCGLU in the last 72 hours. No results found for: SPECIAL  No results found for: CULTURE    No results found for: POCPH, PHART, PH, POCPCO2, ZGI6LKU, PCO2, POCPO2, PO2ART, PO2, POCHCO3, DZH3EAR, HCO3, NBEA, PBEA, BEART, BE, THGBART, THB, LRI7TJA, FAMV9TIE, E4LRAXJJ, O2SAT, FIO2    Radiology:    CT Head WO Contrast    Result Date: 9/29/2021  No acute intracranial abnormality. CT CERVICAL SPINE WO CONTRAST    Result Date: 9/29/2021  No acute abnormality of the cervical spine. CT THORACIC SPINE WO CONTRAST    Result Date: 9/29/2021  1. No large disc herniation or protrusion by CT. 2. Other findings as described. CT Lumbar Spine WO Contrast    Result Date: 9/29/2021  1. No acute fracture. No listhesis. 2. Other findings as described. IR LUMBAR PUNCTURE FOR DIAGNOSIS    Result Date: 10/4/2021  Successful fluoroscopic-guided lumbar puncture. All radiological studies reviewed  Code Status:  Full Code        Electronically signed by Delmer Mendez MD on 10/12/2021 at 9:49 PM    This note was created with the assistance of a speech-recognition program.  Although the intention is to generate a document that actually reflects the content of the visit, no guarantees can be provided that every mistake has been identified and corrected by editing. Note was updated later by me after  physical examination and  completion of the assessment.

## 2021-10-13 NOTE — PROGRESS NOTES
This is a 50 y/o previously healthy WM who presented with 4 days of progressive numbness and weakness following a viral illness about 2 week prior. He was immediately initiated on IVIG and although there were some improvements in lower extremity symptoms and function he went on to develop bilateral facial weakness. As a result of this he was started on PLEX. He has not developed any new symptoms over the last 7 days and has been improving with respect to his ambulation. He has one more exchange to go. There has been some issues with tachycardia overnight. He is aware that his heart rate will speed up at times. He did sleep better last night. Past Medical History:   Diagnosis Date    Herniation of intervertebral disc between L5 and S1     Low back pain     Sciatica        Past Surgical History:   Procedure Laterality Date    HERNIA REPAIR Right     inguinal    IR NONTUNNELED VASCULAR CATHETER  10/5/2021    IR NONTUNNELED VASCULAR CATHETER 10/5/2021 MD MARGARITO Rodriguez [facility] PROC[facility]       History reviewed. No pertinent family history. Social History     Socioeconomic History    Marital status:      Spouse name: None    Number of children: None    Years of education: None    Highest education level: None   Occupational History    None   Tobacco Use    Smoking status: Former Smoker     Quit date: 2014     Years since quittin.0    Smokeless tobacco: Never Used   Substance and Sexual Activity    Alcohol use: Not Currently    Drug use: Not Currently    Sexual activity: None   Other Topics Concern    None   Social History Narrative    None     Social Determinants of Health     Financial Resource Strain:     Difficulty of Paying Living Expenses:    Food Insecurity:     Worried About Running Out of Food in the Last Year:     Ran Out of Food in the Last Year:    Transportation Needs:     Lack of Transportation (Medical):      Lack of Transportation (Non-Medical): Physical Activity:     Days of Exercise per Week:     Minutes of Exercise per Session:    Stress:     Feeling of Stress :    Social Connections:     Frequency of Communication with Friends and Family:     Frequency of Social Gatherings with Friends and Family:     Attends Hindu Services:     Active Member of Clubs or Organizations:     Attends Club or Organization Meetings:     Marital Status:    Intimate Partner Violence:     Fear of Current or Ex-Partner:     Emotionally Abused:     Physically Abused:     Sexually Abused:        Current Facility-Administered Medications   Medication Dose Route Frequency Provider Last Rate Last Admin    calcium gluconate 1,000 mg in dextrose 5 % 100 mL IVPB  1,000 mg IntraVENous PRN Poly Sanon MD        gabapentin (NEURONTIN) capsule 600 mg  600 mg Oral BID- 8&2 Mary Jo Brambila MD   600 mg at 10/13/21 0857    gabapentin (NEURONTIN) capsule 900 mg  900 mg Oral Nightly Mary Jo Brambila MD   900 mg at 10/12/21 2012    zolpidem (AMBIEN) tablet 10 mg  10 mg Oral Nightly PRN Mary Jo Brambila MD   10 mg at 10/12/21 2013    magnesium oxide (MAG-OX) tablet 400 mg  400 mg Oral Daily Mary Jo Brambila MD   400 mg at 10/13/21 0857    calcium gluconate 2,000 mg in sodium chloride 0.9 % 100 mL IVPB  2,000 mg IntraVENous Once Poly Sanon MD        albumin human 5 % IV solution 200 g  200 g IntraVENous Once Poly Sanon MD        heparin (porcine) injection 15,000 Units  15,000 Units IntraCATHeter Once Poly Sanon MD        ibuprofen (ADVIL;MOTRIN) tablet 800 mg  800 mg Oral Q8H PRN Shmuel Momin MD   800 mg at 10/13/21 0858    acetaminophen (TYLENOL) tablet 650 mg  650 mg Oral Q4H PRN Nishi Dunham MD        amLODIPine (NORVASC) tablet 10 mg  10 mg Oral Daily Albaro Spencer MD   10 mg at 10/13/21 0858    hydrALAZINE (APRESOLINE) injection 10 mg  10 mg IntraVENous Q6H PRN Zuly Mcdaniel MD   10 mg at 10/05/21 [id number]  ALPRAZolam (XANAX) tablet 0.25 mg  0.25 mg Oral BID PRN Albaro Spencer MD   0.25 mg at 10/12/21 0346    senna (SENOKOT) tablet 8.6 mg  1 tablet Oral BID PRN Albaro Spencer MD   8.6 mg at 10/08/21 2223    docusate sodium (COLACE) capsule 100 mg  100 mg Oral Daily Albaro Spencer MD   100 mg at 10/13/21 0858    aluminum & magnesium hydroxide-simethicone (MAALOX) 200-200-20 MG/5ML suspension 30 mL  30 mL Oral TID PRN Albaro Spencer MD   30 mL at 10/12/21 0719    famotidine (PEPCID) tablet 20 mg  20 mg Oral BID Shamar Decker MD   20 mg at 10/13/21 0858    sodium chloride flush 0.9 % injection 10 mL  10 mL IntraVENous 2 times per day Shamar Decker MD   10 mL at 10/12/21 2013    sodium chloride flush 0.9 % injection 10 mL  10 mL IntraVENous PRN Albaro Spencer MD        0.9 % sodium chloride infusion  25 mL IntraVENous PRN Albaro Spencer  mL/hr at 10/02/21 1339 25 mL at 10/02/21 1339    ondansetron (ZOFRAN-ODT) disintegrating tablet 4 mg  4 mg Oral Q8H PRN Albaro Spencer MD   4 mg at 09/30/21 1514    Or    ondansetron (ZOFRAN) injection 4 mg  4 mg IntraVENous Q6H PRN Albaro Spencer MD   4 mg at 10/03/21 0433    magnesium hydroxide (MILK OF MAGNESIA) 400 MG/5ML suspension 30 mL  30 mL Oral Daily PRN Albaro Spencer MD   30 mL at 10/08/21 1102    enoxaparin (LOVENOX) injection 40 mg  40 mg SubCUTAneous Daily Albaro Spencer MD   40 mg at 10/13/21 0858    diphenhydrAMINE (BENADRYL) tablet 25 mg  25 mg Oral Q6H PRN Randy Duncan MD        HYDROcodone-acetaminophen (NORCO) 5-325 MG per tablet 1 tablet  1 tablet Oral Q4H PRN Albaro Spencer MD   1 tablet at 10/08/21 0602       No Known Allergies        Vitals:    10/13/21 1200   BP: (!) 136/107   Pulse: 81   Resp: 18   Temp: 97.3 °F (36.3 °C)   SpO2: 96%     Admission weight: 200 lb (90.7 kg)    General Appearance:  NAD  Mental Status Exam:             Level of Alertness:   awake            Orientation:   person, place, time            Memory:   normal Fund of Knowledge:  normal            Attention/Concentration:  normal            Language:  normal        Cranial Nerves     PERRL, EOMI/F, no ptosis, Bilateral facial weakness, eye closure weak but he completely covers sclera. Assessment : This is a 52 y/o with 4 days of progressive and ascending sensory complaints and weakness which occurred 2 weeks after a viral syndrome. His exam was initially significant for proximal upper extremity weakness and bilateral facial palsy but his appendicular weakness has completely resolved. I expect his facial weakness will start to improve shortly as well. It is clear that he has reached the michelle of his disease and now it is just a matter of time for improvement. His is having some tachycardia with mild exertion. I suspect that this is due to some component of autonomic neuropathy associated with his GBS. Will obtain orthostatics today to ensure he is not having OH. If not, he would likely benefit from a beta blocker at least temporarily. Plan:    1. Orthostatic VS today  2. Will consider adding beta blocker if not having OH. 3. Hopefully can d/c tomorrow after PLEX and if tachycardia is well managed.

## 2021-10-13 NOTE — FLOWSHEET NOTE
10/12/21 2127   Provider Notification   Reason for Communication Evaluate   Provider Name Dr. David Franco   Provider Notification Physician   Method of Communication Face to face   Response At bedside   Notification Time 2127   Dr. David Franco at bedside, updated on patient being tachycardic with movement, ECHO and EKG ordered for tomorrow morning. Morning labs also ordered. Will continue to monitor.

## 2021-10-13 NOTE — PROGRESS NOTES
[facility]        10/13/2021   5:44 PM    Name:  Gerardo Levy  MRN:    [id number]     Acct:     [de-identified]   Room:  2041/2041-01  IP Day: 15     Admit Date: 9/29/2021  8:10 PM  PCP: Kaitlin Parks MD    C/C:   Chief Complaint   Patient presents with    Extremity Weakness    Hand Numbness       Assessment:      · Guillain Java syndrome  · Upper and lower extremity weakness/Paresthesias  · Worsening left-sided facial droop  · Lumbar arthritis/Low back pain  · Lumbar herniated disc  · Acute hypoxic respitratory failure  · Fall  · Essential Hypertension  · Mixed hyperlipidemia   · Constipation    · GERD without esophagitis   · Anxiety disorder  · Insomnia   · Tachycardia      Plan:        · Patient is currently in ICU  · O2 ro maintain O2 sat > 92%  · Neurochecks per protocol  · S/p IVIG x5  · Plasmapheresis x5, started on 10/6/2021  · Speech evaluation  · CT brain no acute changes  · CT abdomen and pelvis without contrast no acute changes  · Neurology following  · DVT and GI prophylaxis  · Continue to monitor/telemetry/CBC with differential daily/BMP daily  · Twelve-lead EKG  · Check TSH, T4  · 2D echo  · PT/OT  · Atenolol  · disCussed with neurology  · Discharge planning  · Continue medications as below    PMNR consult    Scheduled Meds:   atenolol  25 mg Oral Daily    gabapentin  600 mg Oral BID- 8&2    gabapentin  900 mg Oral Nightly    magnesium oxide  400 mg Oral Daily    calcium gluconate IVPB  2,000 mg IntraVENous Once    albumin human  200 g IntraVENous Once    heparin (porcine)  15,000 Units IntraCATHeter Once    amLODIPine  10 mg Oral Daily    docusate sodium  100 mg Oral Daily    famotidine  20 mg Oral BID    sodium chloride flush  10 mL IntraVENous 2 times per day    enoxaparin  40 mg SubCUTAneous Daily     Continuous Infusions:   sodium chloride 25 mL (10/02/21 1339)     PRN Meds:  calcium gluconate IVPB, 1,000 mg, PRN  zolpidem, 10 mg, Nightly PRN  ibuprofen, 800 mg, Q8H PRN  acetaminophen, 650 mg, Q4H PRN  hydrALAZINE, 10 mg, Q6H PRN  ALPRAZolam, 0.25 mg, BID PRN  senna, 1 tablet, BID PRN  aluminum & magnesium hydroxide-simethicone, 30 mL, TID PRN  sodium chloride flush, 10 mL, PRN  sodium chloride, 25 mL, PRN  ondansetron, 4 mg, Q8H PRN   Or  ondansetron, 4 mg, Q6H PRN  magnesium hydroxide, 30 mL, Daily PRN  diphenhydrAMINE, 25 mg, Q6H PRN  HYDROcodone 5 mg - acetaminophen, 1 tablet, Q4H PRN            Subjective:     Patient seen and examined at bedside and reviewed  last 24 hrs events with nursing staff  No acute events overnight. Patient denies any acute complaints. Afebrile  Patient denies any chest pain, shortness of Breath, palpitation, headache, dizziness, cough, nausea, vomiting, abdominal pain, pain, changes in urination or bowel habit or rash. Plan plasmapheresis tomorrow a.m. Patient still continues to have neurological symptoms continued on plasmapheresis        ROS:  A 10 point system reviewed and negative otherwise mentioned above. Physical Examination:      Vitals:  BP (!) 149/104   Pulse 104   Temp 98 °F (36.7 °C) (Temporal)   Resp 18   Ht 6' (1.829 m)   Wt 200 lb (90.7 kg)   SpO2 95%   BMI 27.12 kg/m²   Temp (24hrs), Av.7 °F (36.5 °C), Min:97.3 °F (36.3 °C), Max:98 °F (36.7 °C)    Weight:   Wt Readings from Last 3 Encounters:   10/09/21 200 lb (90.7 kg)     I/O last 3 completed shifts:  No intake/output data recorded. No results for input(s): POCGLU in the last 72 hours. General appearance - alert, well appearing, and in no acute distress.   Facial droop  Mental status - oriented to person, place, and time with normal affect  Head - normocephalic and atraumatic  Eyes - pupils equal and reactive, extraocular eye movements intact, conjunctiva clear  Ears - hearing appears to be intact  Nose - no drainage noted  Mouth - mucous membranes moist  Neck - supple, no carotid bruits, thyroid not palpable  Chest - clear to auscultation, normal effort  Heart - normal rate, regular rhythm, no murmur  Abdomen - soft, nontender, nondistended, bowel sounds present all four quadrants, no masses, hepatomegaly or splenomegaly  Neurological - normal speech, facial droop, no movement disorder noted, cranial nerves II through VI grossly intact  Extremities - peripheral pulses palpable, no pedal edema or calf pain with palpation  Skin - no gross lesions, rashes, or induration noted        Medications:      Allergies: No Known Allergies    Current Meds:   Current Facility-Administered Medications:     atenolol (TENORMIN) tablet 25 mg, 25 mg, Oral, Daily, Martin García MD, 25 mg at 10/13/21 1659    calcium gluconate 1,000 mg in dextrose 5 % 100 mL IVPB, 1,000 mg, IntraVENous, PRN, Ruddy Kathleen MD    gabapentin (NEURONTIN) capsule 600 mg, 600 mg, Oral, BID- 8&2, Martin García MD, 600 mg at 10/13/21 1416    gabapentin (NEURONTIN) capsule 900 mg, 900 mg, Oral, Nightly, Martin García MD, 900 mg at 10/12/21 2012    zolpidem (AMBIEN) tablet 10 mg, 10 mg, Oral, Nightly PRN, Martin García MD, 10 mg at 10/12/21 2013    magnesium oxide (MAG-OX) tablet 400 mg, 400 mg, Oral, Daily, Martin García MD, 400 mg at 10/13/21 0857    calcium gluconate 2,000 mg in sodium chloride 0.9 % 100 mL IVPB, 2,000 mg, IntraVENous, Once, Ruddy Kathleen MD    albumin human 5 % IV solution 200 g, 200 g, IntraVENous, Once, Ruddy Kathleen MD    heparin (porcine) injection 15,000 Units, 15,000 Units, IntraCATHeter, Once, Ruddy Kathleen MD    ibuprofen (ADVIL;MOTRIN) tablet 800 mg, 800 mg, Oral, Q8H PRN, Puma Ware MD, 800 mg at 10/13/21 0858    acetaminophen (TYLENOL) tablet 650 mg, 650 mg, Oral, Q4H PRN, Joslyn Rowan MD    amLODIPine (NORVASC) tablet 10 mg, 10 mg, Oral, Daily, Albaro Spencer MD, 10 mg at 10/13/21 0858    hydrALAZINE (APRESOLINE) injection 10 mg, 10 mg, IntraVENous, Q6H PRN, Albaro MD Tj, 10 mg at 10/05/21 0219    ALPRAZolam (XANAX) tablet 0.25 mg, 0.25 mg, Oral, BID PRN, Albaro Spencer MD, 0.25 mg at 10/12/21 0346    senna (SENOKOT) tablet 8.6 mg, 1 tablet, Oral, BID PRN, Albaro Spencer MD, 8.6 mg at 10/08/21 2223    docusate sodium (COLACE) capsule 100 mg, 100 mg, Oral, Daily, Aric Jean MD, 100 mg at 10/13/21 0858    aluminum & magnesium hydroxide-simethicone (MAALOX) 200-200-20 MG/5ML suspension 30 mL, 30 mL, Oral, TID PRN, Albaro Spencer MD, 30 mL at 10/12/21 0719    famotidine (PEPCID) tablet 20 mg, 20 mg, Oral, BID, Aric Jean MD, 20 mg at 10/13/21 0858    sodium chloride flush 0.9 % injection 10 mL, 10 mL, IntraVENous, 2 times per day, Aric Jean MD, 10 mL at 10/13/21 1416    sodium chloride flush 0.9 % injection 10 mL, 10 mL, IntraVENous, PRN, Aric Jean MD    0.9 % sodium chloride infusion, 25 mL, IntraVENous, PRN, Aric Jean MD, Last Rate: 100 mL/hr at 10/02/21 1339, 25 mL at 10/02/21 1339    ondansetron (ZOFRAN-ODT) disintegrating tablet 4 mg, 4 mg, Oral, Q8H PRN, 4 mg at 09/30/21 1514 **OR** ondansetron (ZOFRAN) injection 4 mg, 4 mg, IntraVENous, Q6H PRN, Albaro Spencer MD, 4 mg at 10/03/21 0433    magnesium hydroxide (MILK OF MAGNESIA) 400 MG/5ML suspension 30 mL, 30 mL, Oral, Daily PRN, Albaro Spencer MD, 30 mL at 10/08/21 1102    enoxaparin (LOVENOX) injection 40 mg, 40 mg, SubCUTAneous, Daily, Albaro Spencer MD, 40 mg at 10/13/21 0858    diphenhydrAMINE (BENADRYL) tablet 25 mg, 25 mg, Oral, Q6H PRN, Rajani Salazar MD    HYDROcodone-acetaminophen (NORCO) 5-325 MG per tablet 1 tablet, 1 tablet, Oral, Q4H PRN, Aric Jean MD, 1 tablet at 10/08/21 0602      I/O (24Hr):   No intake or output data in the 24 hours ending 10/13/21 1744    Data:           Labs:    Hematology:  Recent Labs     10/12/21  0454   WBC 10.1   RBC 5.42   HGB 15.1   HCT 45.3   MCV 83.6   MCH 27.9   MCHC 33.3   RDW 12.6      MPV 9.9     Chemistry:  Recent Labs 10/11/21  0333 10/12/21  0454 10/13/21  0503    137 135   K 4.4 4.5 4.9    102 106   CO2 22 27 20   GLUCOSE 103* 108* 102*   BUN 15 15 18   CREATININE 1.01 1.01 1.09   ANIONGAP 10 8* 9   LABGLOM >60 >60 >60   GFRAA >60 >60 >60   CALCIUM 8.6 9.2 8.5*     Recent Labs     10/13/21  0503   TSH 1.85       No results found for: SPECIAL  No results found for: CULTURE    No results found for: POCPH, PHART, PH, POCPCO2, IGY7CMR, PCO2, POCPO2, PO2ART, PO2, POCHCO3, GBP2WHL, HCO3, NBEA, PBEA, BEART, BE, THGBART, THB, SMI3VPV, BQJE3TAL, D7PKVMUU, O2SAT, FIO2    Radiology:    CT Head WO Contrast    Result Date: 9/29/2021  No acute intracranial abnormality. CT CERVICAL SPINE WO CONTRAST    Result Date: 9/29/2021  No acute abnormality of the cervical spine. CT THORACIC SPINE WO CONTRAST    Result Date: 9/29/2021  1. No large disc herniation or protrusion by CT. 2. Other findings as described. CT Lumbar Spine WO Contrast    Result Date: 9/29/2021  1. No acute fracture. No listhesis. 2. Other findings as described. IR LUMBAR PUNCTURE FOR DIAGNOSIS    Result Date: 10/4/2021  Successful fluoroscopic-guided lumbar puncture. All radiological studies reviewed  Code Status:  Full Code        Electronically signed by Ana M Person MD on 10/13/2021 at 5:44 PM    This note was created with the assistance of a speech-recognition program.  Although the intention is to generate a document that actually reflects the content of the visit, no guarantees can be provided that every mistake has been identified and corrected by editing. Note was updated later by me after  physical examination and  completion of the assessment.

## 2021-10-14 VITALS
RESPIRATION RATE: 18 BRPM | DIASTOLIC BLOOD PRESSURE: 81 MMHG | HEART RATE: 69 BPM | WEIGHT: 199.96 LBS | SYSTOLIC BLOOD PRESSURE: 112 MMHG | BODY MASS INDEX: 27.08 KG/M2 | OXYGEN SATURATION: 97 % | TEMPERATURE: 98.2 F | HEIGHT: 72 IN

## 2021-10-14 LAB
ABSOLUTE EOS #: 0.34 K/UL (ref 0–0.44)
ABSOLUTE IMMATURE GRANULOCYTE: 0.04 K/UL (ref 0–0.3)
ABSOLUTE LYMPH #: 2.38 K/UL (ref 1.1–3.7)
ABSOLUTE MONO #: 0.82 K/UL (ref 0.1–1.2)
ANION GAP SERPL CALCULATED.3IONS-SCNC: 10 MMOL/L (ref 9–17)
BASOPHILS # BLD: 1 % (ref 0–2)
BASOPHILS ABSOLUTE: 0.07 K/UL (ref 0–0.2)
BUN BLDV-MCNC: 18 MG/DL (ref 6–20)
BUN/CREAT BLD: 17 (ref 9–20)
CALCIUM IONIZED: 1.3 MMOL/L (ref 1.13–1.33)
CALCIUM SERPL-MCNC: 8.8 MG/DL (ref 8.6–10.4)
CHLORIDE BLD-SCNC: 104 MMOL/L (ref 98–107)
CO2: 22 MMOL/L (ref 20–31)
CREAT SERPL-MCNC: 1.08 MG/DL (ref 0.7–1.2)
DIFFERENTIAL TYPE: NORMAL
EOSINOPHILS RELATIVE PERCENT: 4 % (ref 1–4)
FIBRINOGEN: 157 MG/DL (ref 179–518)
GFR AFRICAN AMERICAN: >60 ML/MIN
GFR NON-AFRICAN AMERICAN: >60 ML/MIN
GFR SERPL CREATININE-BSD FRML MDRD: ABNORMAL ML/MIN/{1.73_M2}
GFR SERPL CREATININE-BSD FRML MDRD: ABNORMAL ML/MIN/{1.73_M2}
GLUCOSE BLD-MCNC: 108 MG/DL (ref 70–99)
HCT VFR BLD CALC: 41.6 % (ref 40.7–50.3)
HEMOGLOBIN: 13.6 G/DL (ref 13–17)
IMMATURE GRANULOCYTES: 0 %
LYMPHOCYTES # BLD: 26 % (ref 24–43)
MCH RBC QN AUTO: 27.5 PG (ref 25.2–33.5)
MCHC RBC AUTO-ENTMCNC: 32.7 G/DL (ref 28.4–34.8)
MCV RBC AUTO: 84.2 FL (ref 82.6–102.9)
MONOCYTES # BLD: 9 % (ref 3–12)
NRBC AUTOMATED: 0 PER 100 WBC
PDW BLD-RTO: 12.7 % (ref 11.8–14.4)
PLATELET # BLD: 191 K/UL (ref 138–453)
PLATELET ESTIMATE: NORMAL
PMV BLD AUTO: 10.1 FL (ref 8.1–13.5)
POC IONIZED CALCIUM: 1.2 MMOL/L (ref 1.15–1.33)
POTASSIUM SERPL-SCNC: 4.8 MMOL/L (ref 3.7–5.3)
RBC # BLD: 4.94 M/UL (ref 4.21–5.77)
RBC # BLD: NORMAL 10*6/UL
SEG NEUTROPHILS: 60 % (ref 36–65)
SEGMENTED NEUTROPHILS ABSOLUTE COUNT: 5.62 K/UL (ref 1.5–8.1)
SODIUM BLD-SCNC: 136 MMOL/L (ref 135–144)
WBC # BLD: 9.3 K/UL (ref 3.5–11.3)
WBC # BLD: NORMAL 10*3/UL

## 2021-10-14 PROCEDURE — 2580000003 HC RX 258: Performed by: FAMILY MEDICINE

## 2021-10-14 PROCEDURE — 6370000000 HC RX 637 (ALT 250 FOR IP): Performed by: HOSPITALIST

## 2021-10-14 PROCEDURE — 6370000000 HC RX 637 (ALT 250 FOR IP): Performed by: PSYCHIATRY & NEUROLOGY

## 2021-10-14 PROCEDURE — 85025 COMPLETE CBC W/AUTO DIFF WBC: CPT

## 2021-10-14 PROCEDURE — 6370000000 HC RX 637 (ALT 250 FOR IP): Performed by: FAMILY MEDICINE

## 2021-10-14 PROCEDURE — 6360000002 HC RX W HCPCS: Performed by: FAMILY MEDICINE

## 2021-10-14 PROCEDURE — 82330 ASSAY OF CALCIUM: CPT

## 2021-10-14 PROCEDURE — 85384 FIBRINOGEN ACTIVITY: CPT

## 2021-10-14 PROCEDURE — 80048 BASIC METABOLIC PNL TOTAL CA: CPT

## 2021-10-14 PROCEDURE — 36415 COLL VENOUS BLD VENIPUNCTURE: CPT

## 2021-10-14 PROCEDURE — 92507 TX SP LANG VOICE COMM INDIV: CPT

## 2021-10-14 PROCEDURE — 36516 APHERESIS IMMUNOADS SLCTV: CPT

## 2021-10-14 RX ORDER — ATENOLOL 25 MG/1
25 TABLET ORAL DAILY
Qty: 30 TABLET | Refills: 3 | Status: SHIPPED | OUTPATIENT
Start: 2021-10-15

## 2021-10-14 RX ADMIN — IBUPROFEN 800 MG: 800 TABLET, FILM COATED ORAL at 08:42

## 2021-10-14 RX ADMIN — DOCUSATE SODIUM 100 MG: 100 CAPSULE, LIQUID FILLED ORAL at 08:40

## 2021-10-14 RX ADMIN — GABAPENTIN 600 MG: 300 CAPSULE ORAL at 08:39

## 2021-10-14 RX ADMIN — ATENOLOL 25 MG: 50 TABLET ORAL at 08:39

## 2021-10-14 RX ADMIN — FAMOTIDINE 20 MG: 20 TABLET, FILM COATED ORAL at 08:39

## 2021-10-14 RX ADMIN — MAGNESIUM OXIDE TAB 400 MG (241.3 MG ELEMENTAL MG) 400 MG: 400 (241.3 MG) TAB at 08:39

## 2021-10-14 RX ADMIN — GABAPENTIN 600 MG: 300 CAPSULE ORAL at 13:48

## 2021-10-14 RX ADMIN — AMLODIPINE BESYLATE 10 MG: 10 TABLET ORAL at 08:40

## 2021-10-14 RX ADMIN — SODIUM CHLORIDE, PRESERVATIVE FREE 10 ML: 5 INJECTION INTRAVENOUS at 08:43

## 2021-10-14 RX ADMIN — ENOXAPARIN SODIUM 40 MG: 40 INJECTION SUBCUTANEOUS at 08:38

## 2021-10-14 ASSESSMENT — PAIN SCALES - GENERAL
PAINLEVEL_OUTOF10: 0
PAINLEVEL_OUTOF10: 6

## 2021-10-14 ASSESSMENT — PAIN - FUNCTIONAL ASSESSMENT: PAIN_FUNCTIONAL_ASSESSMENT: PREVENTS OR INTERFERES SOME ACTIVE ACTIVITIES AND ADLS

## 2021-10-14 ASSESSMENT — PAIN DESCRIPTION - PAIN TYPE: TYPE: ACUTE PAIN

## 2021-10-14 ASSESSMENT — PAIN DESCRIPTION - FREQUENCY: FREQUENCY: CONTINUOUS

## 2021-10-14 NOTE — PROGRESS NOTES
Physical Therapy  DATE: 10/14/2021    NAME: Sadie Loyola  MRN: [id number]   : 1975    Patient not seen this date for Physical Therapy due to:      [] Cancel by RN or physician due to:    [] Hemodialysis    [] Critical Lab Value Level     [] Blood transfusion in progress    [] Acute or unstable cardiovascular status   _MAP < 55 or more than >115  _HR < 40 or > 130    [] Acute or unstable pulmonary status   -FiO2 > 60%   _RR < 5 or >40    _O2 sats < 85%    [] Strict Bedrest    [] Off Unit for surgery or procedure    [] Off Unit for testing       [] Pending imaging to R/O fracture    [] Refusal by Patient      [x] Other: Writer attempted to see pt this AM; plasmapharesis being preformed in patient's room. PT will ck back as able. Writer attempted to see pt this PM and RN reporting pt currently getting dressed and preparing for discharge soon. [] PT being discontinued at this time. Patient independent. No further needs. [] PT being discontinued at this time as the patient has been transferred to hospice care. No further needs.       Darnell Raya, PT

## 2021-10-14 NOTE — DISCHARGE SUMMARY
Pt given discharge instructions and reviewed meds. All questions answered. Escorted by aid to front door via wheelchair.

## 2021-10-14 NOTE — PROGRESS NOTES
Pt was on consult list for Dr Alia Martinez, but  CM notes indicate that pt is planning to d/c home. Writer spoke with Selena Stewart SA CM, who confirms pt is to d/c home and [facility] can disregard this consult.

## 2021-10-14 NOTE — PROGRESS NOTES
Clarified order for Gabapentin with neurology, Dr Delmar Chauhan, via [facility] and is ok to discharge home on 600mg TID with one month supply. Two scripts called into [address] [place] [address] on [address] [phone]    1. Atenolol 25mg, 1 daily with a 30 day supply, no refills. 2. Gabapentin 600mg, TID with a 30 day supply, no refills. Patient and wife aware Rx called in. All questions answered.

## 2021-10-14 NOTE — CARE COORDINATION
Referral made to [facility]. Waiting on response. Patient has been accepted. RN aware that the patient needs a walker.

## 2021-10-14 NOTE — PLAN OF CARE
Problem: Falls - Risk of:  Goal: Will remain free from falls  Description: Will remain free from falls  10/14/2021 1454 by Christophe Bell RN  Outcome: Met This Shift  10/14/2021 1454 by Christophe Bell RN  Outcome: Met This Shift     Problem: Falls - Risk of:  Goal: Absence of physical injury  Description: Absence of physical injury  10/14/2021 1454 by Christophe Bell RN  Outcome: Met This Shift     Problem: Safety:  Goal: Free from accidental physical injury  Description: Free from accidental physical injury  10/14/2021 1454 by Christophe Bell RN  Outcome: Met This Shift  10/14/2021 1454 by Christophe Bell RN  Outcome: Met This Shift     Problem: Safety:  Goal: Free from intentional harm  Description: Free from intentional harm  10/14/2021 1454 by Christophe Bell RN  Outcome: Met This Shift  10/14/2021 1454 by Christophe Bell RN  Outcome: Met This Shift

## 2021-10-14 NOTE — PROGRESS NOTES
Mr Janet Sanchez has finished has last run of PLEX. Tachycardia is much better controlled. He is ready for discharge. He should be discharged on his gabapentin 300mg, take 2 in the morning, at 2pm and 3 caps qhs, as well as atenolol 25mg    He should follow-up with neurology in 2-4 weeks. Dr Sandy Stock saw him previously in house but any neurologist would be fine. Thank you for allowing me to help in this patient's care. Darrell Boyle. Madina Ray M.D.   Clinical Neurophysiologist  Neuromuscular Medicine

## 2021-10-14 NOTE — PROGRESS NOTES
Speech Language Pathology  Speech Language Pathology  [facility]    Speech Language Treatment Note    Date: 10/14/2021  Patients Name: Sadie Loyola  MRN: [id number]  Diagnosis:   Patient Active Problem List   Diagnosis Code    Paresthesia of both lower extremities R20.2    Guillain Barré syndrome ([facility] [address] G61.0       Pain: 0/10    Speech and Language Treatment  Treatment time: 3017-4066    Subjective: [x] Alert [x] Cooperative     [] Confused     [] Agitated    [] Lethargic      Objective/Assessment:    Speech: Note pt continues with mild right facial weakness. He reports is speech is better some days than others but overall is improving. Reports no difficulty eating/drinking. Written and verbal education provided regarding compensatory strategies for dysarthria (overarticulation, slow rate, increase volume, decrease background noise). Pt verbalizes understanding. Written exercises provided for dysarthria. Exercises were discussed and pt was able to demonstrate each with min verbal cues. Discussed implementing OMEX program 2-3x/day if facial strength does not return to baseline after discharge, follow up with OP speech therapy as needed. Pt verbalizes understanding. Plan:  [x] Continue ST services    [] Discharge from ST:      Discharge recommendations: []  Further therapy recommended at discharge. The patient should be able to tolerate at least 3 hours of therapy per day over 5 days or 15 hours over 7 days. [] Further therapy recommended at discharge. [x] No therapy recommended at discharge.  (if facial weakness persists following discharge and recovery, consider OP speech therapy)      Treatment completed by: Eagle [place], SLP, M.S. Adnres Maravilla

## 2021-10-14 NOTE — PROGRESS NOTES
[address] Hospitalist        10/14/2021   1:59 PM    Name:  Tacho Duncan  MRN:    [id number]     Acct:     [de-identified]   Room:  2041/2041-01  IP Day: 15     Admit Date: 9/29/2021  8:10 PM  PCP: Ashly Rojo MD    C/C:   Chief Complaint   Patient presents with    Extremity Weakness    Hand Numbness       Assessment:      · Guillain [place] syndrome  · Upper and lower extremity weakness/Paresthesias  · Worsening left-sided facial droop  · Lumbar arthritis/Low back pain  · Lumbar herniated disc  · Acute hypoxic respitratory failure  · Fall  · Essential Hypertension  · Mixed hyperlipidemia   · Constipation    · GERD without esophagitis   · Anxiety disorder  · Insomnia   · Tachycardia      Plan:        · Patient is currently in ICU  · O2 ro maintain O2 sat > 92%  · Neurochecks per protocol  · S/p IVIG x5  · Plasmapheresis x5, started on 10/6/2021  · Speech evaluation  · CT brain no acute changes  · CT abdomen and pelvis without contrast no acute changes  · Neurology following  · DVT and GI prophylaxis  · Continue to monitor/telemetry/CBC with differential daily/BMP daily  · Twelve-lead EKG  · Check TSH, T4  · 2D echo  · PT/OT  · Atenolol  · disCussed with neurology  · Rx  · LUBNA  · meds reconciliation  · D/w RN  · Spent more than 35 min  · Discharge planning  · Continue medications as below    PMNR consult    Scheduled Meds:   atenolol  25 mg Oral Daily    gabapentin  600 mg Oral BID- 8&2    gabapentin  900 mg Oral Nightly    magnesium oxide  400 mg Oral Daily    calcium gluconate IVPB  2,000 mg IntraVENous Once    albumin human  200 g IntraVENous Once    heparin (porcine)  15,000 Units IntraCATHeter Once    amLODIPine  10 mg Oral Daily    docusate sodium  100 mg Oral Daily    famotidine  20 mg Oral BID    sodium chloride flush  10 mL IntraVENous 2 times per day    enoxaparin  40 mg SubCUTAneous Daily     Continuous Infusions:   sodium chloride 25 mL (10/02/21 1339)     PRN Meds:  calcium gluconate IVPB, 1,000 mg, PRN  zolpidem, 10 mg, Nightly PRN  ibuprofen, 800 mg, Q8H PRN  acetaminophen, 650 mg, Q4H PRN  hydrALAZINE, 10 mg, Q6H PRN  ALPRAZolam, 0.25 mg, BID PRN  senna, 1 tablet, BID PRN  aluminum & magnesium hydroxide-simethicone, 30 mL, TID PRN  sodium chloride flush, 10 mL, PRN  sodium chloride, 25 mL, PRN  ondansetron, 4 mg, Q8H PRN   Or  ondansetron, 4 mg, Q6H PRN  magnesium hydroxide, 30 mL, Daily PRN  diphenhydrAMINE, 25 mg, Q6H PRN  HYDROcodone 5 mg - acetaminophen, 1 tablet, Q4H PRN            Subjective:     Patient seen and examined at bedside and reviewed  last 24 hrs events with nursing staff  No acute events overnight. Feels better  Patient denies any acute complaints. Afebrile  Patient denies any chest pain, shortness of Breath, palpitation, headache, dizziness, cough, nausea, vomiting, abdominal pain, pain, changes in urination or bowel habit or rash. Plan plasmapheresis  Eager to go home. ROS:  A 10 point system reviewed and negative otherwise mentioned above. Physical Examination:      Vitals:  /81   Pulse 69   Temp 98.2 °F (36.8 °C) (Oral)   Resp 18   Ht 6' (1.829 m)   Wt 199 lb 15.3 oz (90.7 kg)   SpO2 97%   BMI 27.12 kg/m²   Temp (24hrs), Av.1 °F (36.7 °C), Min:97.2 °F (36.2 °C), Max:98.9 °F (37.2 °C)    Weight:   Wt Readings from Last 3 Encounters:   10/14/21 199 lb 15.3 oz (90.7 kg)     I/O last 3 completed shifts:  No intake/output data recorded. No results for input(s): POCGLU in the last 72 hours. General appearance - alert, well appearing, and in no acute distress.   Facial droop improved  Mental status - oriented to person, place, and time with normal affect  Head - normocephalic and atraumatic  Eyes - pupils equal and reactive, extraocular eye movements intact, conjunctiva clear  Ears - hearing appears to be intact  Nose - no drainage noted  Mouth - mucous membranes moist  Neck - supple, no carotid bruits, thyroid not palpable  Chest - clear to auscultation, normal effort  Heart - normal rate, regular rhythm, no murmur  Abdomen - soft, nontender, nondistended, bowel sounds present all four quadrants, no masses, hepatomegaly or splenomegaly  Neurological - normal speech, facial droop, no movement disorder noted, cranial nerves II through VI grossly intact  Extremities - peripheral pulses palpable, no pedal edema or calf pain with palpation  Skin - no gross lesions, rashes, or induration noted        Medications:      Allergies: No Known Allergies    Current Meds:   Current Facility-Administered Medications:     atenolol (TENORMIN) tablet 25 mg, 25 mg, Oral, Daily, Jyoti Aguayo MD, 25 mg at 10/14/21 0839    calcium gluconate 1,000 mg in dextrose 5 % 100 mL IVPB, 1,000 mg, IntraVENous, PRN, Jude Villasenor MD    gabapentin (NEURONTIN) capsule 600 mg, 600 mg, Oral, BID- 8&2, Jyoti Aguyao MD, 600 mg at 10/14/21 1348    gabapentin (NEURONTIN) capsule 900 mg, 900 mg, Oral, Nightly, Jyoti Aguayo MD, 900 mg at 10/13/21 2017    zolpidem (AMBIEN) tablet 10 mg, 10 mg, Oral, Nightly PRN, Jyoti Aguayo MD, 10 mg at 10/13/21 2018    magnesium oxide (MAG-OX) tablet 400 mg, 400 mg, Oral, Daily, Jyoti Aguayo MD, 400 mg at 10/14/21 5211    calcium gluconate 2,000 mg in sodium chloride 0.9 % 100 mL IVPB, 2,000 mg, IntraVENous, Once, Jude Villasenor MD    albumin human 5 % IV solution 200 g, 200 g, IntraVENous, Once, Jude Villasenor MD    heparin (porcine) injection 15,000 Units, 15,000 Units, IntraCATHeter, Once, Jude Villasenor MD    ibuprofen (ADVIL;MOTRIN) tablet 800 mg, 800 mg, Oral, Q8H PRN, Amie Draper MD, 800 mg at 10/14/21 0842    acetaminophen (TYLENOL) tablet 650 mg, 650 mg, Oral, Q4H PRN, Violeta Webb MD    amLODIPine (NORVASC) tablet 10 mg, 10 mg, Oral, Daily, Albaro Spencer MD, 10 mg at 10/14/21 0840    hydrALAZINE (APRESOLINE) injection 10 mg, 10 mg, IntraVENous, Q6H PRN, Consuelo Bar MD, 10 mg at 10/05/21 0219    ALPRAZolam (XANAX) tablet 0.25 mg, 0.25 mg, Oral, BID PRN, Albaro Spencer MD, 0.25 mg at 10/12/21 0346    senna (SENOKOT) tablet 8.6 mg, 1 tablet, Oral, BID PRN, Albaro Spencer MD, 8.6 mg at 10/08/21 2223    docusate sodium (COLACE) capsule 100 mg, 100 mg, Oral, Daily, Albaro Spencer MD, 100 mg at 10/14/21 0840    aluminum & magnesium hydroxide-simethicone (MAALOX) 200-200-20 MG/5ML suspension 30 mL, 30 mL, Oral, TID PRN, Albaro Spencer MD, 30 mL at 10/12/21 0719    famotidine (PEPCID) tablet 20 mg, 20 mg, Oral, BID, Albaro Spencer MD, 20 mg at 10/14/21 0839    sodium chloride flush 0.9 % injection 10 mL, 10 mL, IntraVENous, 2 times per day, Consuelo Bar MD, 10 mL at 10/14/21 0843    sodium chloride flush 0.9 % injection 10 mL, 10 mL, IntraVENous, PRN, Albaro Spencer MD    0.9 % sodium chloride infusion, 25 mL, IntraVENous, PRN, Albaro Spencer MD, Last Rate: 100 mL/hr at 10/02/21 1339, 25 mL at 10/02/21 1339    ondansetron (ZOFRAN-ODT) disintegrating tablet 4 mg, 4 mg, Oral, Q8H PRN, 4 mg at 09/30/21 1514 **OR** ondansetron (ZOFRAN) injection 4 mg, 4 mg, IntraVENous, Q6H PRN, Albaro Spencer MD, 4 mg at 10/03/21 0433    magnesium hydroxide (MILK OF MAGNESIA) 400 MG/5ML suspension 30 mL, 30 mL, Oral, Daily PRN, Albaro Spencer MD, 30 mL at 10/08/21 1102    enoxaparin (LOVENOX) injection 40 mg, 40 mg, SubCUTAneous, Daily, Albaro Spencer MD, 40 mg at 10/14/21 0838    diphenhydrAMINE (BENADRYL) tablet 25 mg, 25 mg, Oral, Q6H PRN, Julienne Bustillo MD    HYDROcodone-acetaminophen (NORCO) 5-325 MG per tablet 1 tablet, 1 tablet, Oral, Q4H PRN, Consuelo Bar MD, 1 tablet at 10/08/21 0602      I/O (24Hr):   No intake or output data in the 24 hours ending 10/14/21 1359    Data:           Labs:    Hematology:  Recent Labs     10/12/21  0454 10/14/21  0506   WBC 10.1 9.3   RBC 5.42 4.94   HGB 15.1 13.6   HCT 45.3 41.6   MCV 83.6 84.2   MCH 27.9 27.5   MCHC 33.3 32.7   RDW 12.6 12.7    191   MPV 9.9 10.1     Chemistry:  Recent Labs     10/12/21  0454 10/13/21  0503 10/14/21  0506    135 136   K 4.5 4.9 4.8    106 104   CO2 27 20 22   GLUCOSE 108* 102* 108*   BUN 15 18 18   CREATININE 1.01 1.09 1.08   ANIONGAP 8* 9 10   LABGLOM >60 >60 >60   GFRAA >60 >60 >60   CALCIUM 9.2 8.5* 8.8   CAION  --   --  1.30     Recent Labs     10/13/21  0503   TSH 1.85       No results found for: SPECIAL  No results found for: CULTURE    No results found for: POCPH, PHART, PH, POCPCO2, MTZ9SMY, PCO2, POCPO2, PO2ART, PO2, POCHCO3, RWW9JQD, HCO3, NBEA, PBEA, BEART, BE, THGBART, THB, GOM1MSO, XVKC6TIQ, B7GBVDZG, O2SAT, FIO2    Radiology:    CT Head WO Contrast    Result Date: 9/29/2021  No acute intracranial abnormality. CT CERVICAL SPINE WO CONTRAST    Result Date: 9/29/2021  No acute abnormality of the cervical spine. CT THORACIC SPINE WO CONTRAST    Result Date: 9/29/2021  1. No large disc herniation or protrusion by CT. 2. Other findings as described. CT Lumbar Spine WO Contrast    Result Date: 9/29/2021  1. No acute fracture. No listhesis. 2. Other findings as described. IR LUMBAR PUNCTURE FOR DIAGNOSIS    Result Date: 10/4/2021  Successful fluoroscopic-guided lumbar puncture. All radiological studies reviewed  Code Status:  Full Code        Electronically signed by Pradip Beckman MD on 10/14/2021 at 1:59 PM    This note was created with the assistance of a speech-recognition program.  Although the intention is to generate a document that actually reflects the content of the visit, no guarantees can be provided that every mistake has been identified and corrected by editing. Note was updated later by me after  physical examination and  completion of the assessment.

## 2021-10-17 NOTE — DISCHARGE SUMMARY
[facility].,    Adult Hospitalist      Patient ID: Brittany August  MRN: [id number]     Acct:  [de-identified]       Patient's PCP: Ingrid Hargrove MD    Admit Date: 9/29/2021     Discharge Date: 10/14/2021      Admitting Physician: Otto Kemp MD    Discharge Physician: Otto Kemp MD     CONSULTANTS: Patient Care Team:  Ingrid Hargrove MD as PCP - General (Family Medicine)    PROCEDURES PERFORMED:     Active Discharge Diagnoses:  · Guillain [place] syndrome  · Upper and lower extremity weakness/Paresthesias  · Worsening left-sided facial droop  · Lumbar arthritis/Low back pain  · Lumbar herniated disc  · Acute hypoxic respitratory failure  · Fall  · Essential Hypertension  · Mixed hyperlipidemia   · Constipation    · GERD without esophagitis   · Anxiety disorder  · Insomnia   · Tachycardia      Primary Problem  <principal problem not specified>    Hospital Course: Patient admitted with bilateral upper and lower extremity weakness with paresthesias. Neurochecks initiated. Neurology consulted. Patient diagnosed with Guillain-[place] syndrome. Patient received IVIG 5 doses without significant improvement. Patient developed facial droop in addition. Patient's breathing remained stable. Patient had occasional abdominal pain which had been chronic and serious causes were ruled out. Patient then received plasmapheresis every other day for 5 doses. Neurontin was started for paresthesias    Patient fell because of weakness of his lower extremities. Aggressive physical therapy was maintained. Patient was noted to have tachycardia after which atenolol was initiated. Patient had initially decided to go to inpatient rehab. However later patient and family decided to go home with home therapy    The plan was discussed in detail with patient who agreed with the plan and verbalized understanding .     The patient was seen and examined on day of discharge and this discharge summary is in conjunction with any daily progress note from day of discharge. Hospital Data:    Labs:    Hematology:No results for input(s): WBC, RBC, HGB, HCT, MCV, MCH, MCHC, RDW, PLT, MPV, SEDRATE, CRP, INR, DDIMER, QU4ZBAHA, LABABSO in the last 72 hours. Invalid input(s): PT  Chemistry:No results for input(s): NA, K, CL, CO2, GLUCOSE, BUN, CREATININE, MG, ANIONGAP, LABGLOM, GFRAA, CALCIUM, CAION, PHOS, PSA, PROBNP, TROPHS, CKTOTAL, CKMB, CKMBINDEX, MYOGLOBIN, DIGOXIN, LACTACIDWB in the last 72 hours. No results for input(s): PROT, LABALBU, LABA1C, M6ZDFKZ, U7TROYH, FT4, TSH, AST, ALT, LDH, GGT, ALKPHOS, LABGGT, BILITOT, BILIDIR, AMMONIA, AMYLASE, LIPASE, LACTATE, CHOL, HDL, LDLCHOLESTEROL, CHOLHDLRATIO, TRIG, VLDL, HZV24BW, PHENYTOIN, PHENYF, URICACID, POCGLU in the last 72 hours. Lab Results   Component Value Date    INR 1.1 10/05/2021    INR 1.1 10/04/2021    INR 1.1 09/29/2021    PROTIME 14.3 (H) 10/05/2021    PROTIME 13.7 10/04/2021    PROTIME 11.0 09/29/2021     No results found for: SPECIAL  No results found for: CULTURE    No results found for: POCPH, PHART, PH, POCPCO2, AHF9LGD, PCO2, POCPO2, PO2ART, PO2, POCHCO3, NZX7SEX, HCO3, NBEA, PBEA, BEART, BE, THGBART, THB, JES8REE, KMNP3RJM, M6AGQNPX, O2SAT, FIO2    Radiology:    No results found. All radiological studies reviewed      Reviews of Symptoms:    A 10 point system is reviewed and  negative except described in hospital course    Physical Exam:    Vitals:  /81   Pulse 69   Temp 98.2 °F (36.8 °C) (Oral)   Resp 18   Ht 6' (1.829 m)   Wt 199 lb 15.3 oz (90.7 kg)   SpO2 97%   BMI 27.12 kg/m²   No data recorded.       General appearance - alert, well appearing, and in no acute distress  Mental status - oriented to person, place, and time with normal affect  Head - normocephalic and atraumatic  Eyes - pupils equal and reactive, extraocular eye movements intact, conjunctiva clear  Ears - hearing appears to be intact  Nose - no drainage noted  Mouth - mucous membranes moist  Neck - supple, no carotid bruits, thyroid not palpable  Chest - clear to auscultation, normal effort  Heart - normal rate, regular rhythm, no murmur  Abdomen - soft, nontender, nondistended, bowel sounds present all four quadrants, no masses, hepatomegaly or splenomegaly  Neurological - normal speech, no focal findings or movement disorder noted, cranial nerves II through XII grossly intact  Extremities - peripheral pulses palpable, no pedal edema or calf pain with palpation  Skin - no gross lesions, rashes, or induration noted      Consults:  IP CONSULT TO NEUROLOGY  IP CONSULT TO PHYSICAL MEDICINE REHAB    Disposition: Home    Discharged Condition: Stable    Follow Up: Steffen Candelario MD  [address] [place] [address]  [phone]    In 2 weeks  Follow up    [facility], [address]  [phone]    Schedule an appointment as soon as possible for a visit in 2 weeks  Follow up            Diet: No diet orders on file    Discharge Medications:    [facility]   Home Medication Instructions WVD:[id number]    Printed on:10/17/21 1613   Medication Information                      amLODIPine (NORVASC) 2.5 MG tablet  Take 2.5 mg by mouth daily             atenolol (TENORMIN) 25 MG tablet  Take 1 tablet by mouth daily             atorvastatin (LIPITOR) 40 MG tablet  Take 40 mg by mouth daily             Misc Natural Products (JOINT SUPPORT COMPLEX) CAPS  Take 1 capsule by mouth daily             polyethylene glycol (GLYCOLAX) 17 GM/SCOOP powder  Take 17 g by mouth daily                 Code Status:  Prior    Time Spent on discharge is  35 mins in patient examination, evaluation, counseling as well as medication reconciliation, prescriptions for required medications, discharge plan and follow up.     Electronically signed by Gracy Anders MD on 10/17/2021 at 5:07 PM     Thank you Dr. Steffen Candelario MD for the opportunity to be involved in this patient's care. This note was created with the assistance of a speech-recognition program.  Although the intention is to generate a document that actually reflects the content of the visit, no guarantees can be provided that every mistake has been identified and corrected by editing. Note was updated later by me after  physical examination and  completion of the assessment.